# Patient Record
Sex: FEMALE | Race: WHITE | NOT HISPANIC OR LATINO | Employment: OTHER | URBAN - METROPOLITAN AREA
[De-identification: names, ages, dates, MRNs, and addresses within clinical notes are randomized per-mention and may not be internally consistent; named-entity substitution may affect disease eponyms.]

---

## 2017-05-30 RX ORDER — BUTALBITAL, ACETAMINOPHEN, CAFFEINE AND CODEINE PHOSPHATE 300; 50; 40; 30 MG/1; MG/1; MG/1; MG/1
1 CAPSULE ORAL DAILY PRN
COMMUNITY
Start: 2017-01-23 | End: 2017-05-30 | Stop reason: SDUPTHER

## 2017-05-30 RX ORDER — BUTALBITAL, ACETAMINOPHEN, CAFFEINE AND CODEINE PHOSPHATE 300; 50; 40; 30 MG/1; MG/1; MG/1; MG/1
1 CAPSULE ORAL DAILY PRN
Qty: 30 CAPSULE | Refills: 0 | Status: SHIPPED | OUTPATIENT
Start: 2017-05-30 | End: 2017-06-19 | Stop reason: CLARIF

## 2017-05-30 NOTE — TELEPHONE ENCOUNTER
----- Message from Cailin Allen sent at 5/30/2017 12:37 PM CDT -----  Butalbital APAP Caff Cod -92-30

## 2017-06-16 VITALS
WEIGHT: 141 LBS | HEIGHT: 65 IN | OXYGEN SATURATION: 98 % | SYSTOLIC BLOOD PRESSURE: 134 MMHG | DIASTOLIC BLOOD PRESSURE: 80 MMHG | HEART RATE: 67 BPM | BODY MASS INDEX: 23.49 KG/M2

## 2017-06-16 RX ORDER — DICLOFENAC SODIUM 10 MG/G
1 GEL TOPICAL 2 TIMES DAILY PRN
COMMUNITY
End: 2019-01-14 | Stop reason: SDUPTHER

## 2017-06-16 RX ORDER — AMLODIPINE BESYLATE 5 MG/1
1 TABLET ORAL DAILY
COMMUNITY
End: 2017-06-19 | Stop reason: SDUPTHER

## 2017-06-16 RX ORDER — LEVOTHYROXINE SODIUM 150 UG/1
1 TABLET ORAL DAILY
COMMUNITY
End: 2017-06-19 | Stop reason: DRUGHIGH

## 2017-06-16 RX ORDER — BUPROPION HYDROCHLORIDE 300 MG/1
1 TABLET ORAL DAILY
COMMUNITY
End: 2017-06-19 | Stop reason: SDUPTHER

## 2017-06-16 RX ORDER — LEVOTHYROXINE SODIUM 137 UG/1
1 TABLET ORAL
COMMUNITY
End: 2017-07-20 | Stop reason: CLARIF

## 2017-06-16 RX ORDER — OLOPATADINE HYDROCHLORIDE 1 MG/ML
1 SOLUTION/ DROPS OPHTHALMIC DAILY
COMMUNITY
End: 2018-11-20 | Stop reason: SDUPTHER

## 2017-06-16 RX ORDER — METOPROLOL SUCCINATE 50 MG/1
1 TABLET, EXTENDED RELEASE ORAL DAILY
COMMUNITY
End: 2017-06-19 | Stop reason: SDUPTHER

## 2017-06-19 ENCOUNTER — OFFICE VISIT (OUTPATIENT)
Dept: FAMILY MEDICINE | Facility: CLINIC | Age: 62
End: 2017-06-19
Payer: COMMERCIAL

## 2017-06-19 VITALS
HEART RATE: 79 BPM | HEIGHT: 65 IN | SYSTOLIC BLOOD PRESSURE: 130 MMHG | WEIGHT: 141 LBS | DIASTOLIC BLOOD PRESSURE: 80 MMHG | OXYGEN SATURATION: 98 % | BODY MASS INDEX: 23.49 KG/M2

## 2017-06-19 DIAGNOSIS — G43.009 MIGRAINE WITHOUT AURA AND WITHOUT STATUS MIGRAINOSUS, NOT INTRACTABLE: ICD-10-CM

## 2017-06-19 DIAGNOSIS — I10 HYPERTENSION, ESSENTIAL: ICD-10-CM

## 2017-06-19 DIAGNOSIS — E03.4 HYPOTHYROIDISM DUE TO ACQUIRED ATROPHY OF THYROID: ICD-10-CM

## 2017-06-19 DIAGNOSIS — F32.1 MODERATE MAJOR DEPRESSION: ICD-10-CM

## 2017-06-19 PROCEDURE — 99214 OFFICE O/P EST MOD 30 MIN: CPT | Mod: ,,, | Performed by: FAMILY MEDICINE

## 2017-06-19 RX ORDER — BUTALBITAL, ASPIRIN, CAFFEINE AND CODEINE PHOSPHATE 50; 325; 40; 30 MG/1; MG/1; MG/1; MG/1
1 CAPSULE ORAL EVERY 4 HOURS PRN
Qty: 90 CAPSULE | Refills: 1 | Status: SHIPPED | OUTPATIENT
Start: 2017-06-19 | End: 2017-06-29

## 2017-06-19 RX ORDER — AMLODIPINE BESYLATE 5 MG/1
5 TABLET ORAL DAILY
Qty: 90 TABLET | Refills: 1 | Status: SHIPPED | OUTPATIENT
Start: 2017-06-19 | End: 2017-12-19 | Stop reason: SDUPTHER

## 2017-06-19 RX ORDER — BUPROPION HYDROCHLORIDE 300 MG/1
300 TABLET ORAL DAILY
Qty: 90 TABLET | Refills: 1 | Status: SHIPPED | OUTPATIENT
Start: 2017-06-19 | End: 2017-07-18 | Stop reason: SDUPTHER

## 2017-06-19 RX ORDER — BUTALBITAL, ACETAMINOPHEN, CAFFEINE AND CODEINE PHOSPHATE 300; 50; 40; 30 MG/1; MG/1; MG/1; MG/1
1 CAPSULE ORAL DAILY PRN
Qty: 30 CAPSULE | Refills: 0 | Status: CANCELLED | OUTPATIENT
Start: 2017-06-19

## 2017-06-19 RX ORDER — METOPROLOL SUCCINATE 50 MG/1
50 TABLET, EXTENDED RELEASE ORAL DAILY
Qty: 90 TABLET | Refills: 1 | Status: SHIPPED | OUTPATIENT
Start: 2017-06-19 | End: 2017-11-14 | Stop reason: ALTCHOICE

## 2017-06-19 RX ORDER — VITAMIN B COMPLEX
1 CAPSULE ORAL DAILY
COMMUNITY
End: 2017-12-19

## 2017-06-19 NOTE — PATIENT INSTRUCTIONS
Know the Signs and Symptoms of Depression  Everyone feels down at times. The blues are a natural part of life. But an unhappy period thats intense or lasts for more than a couple of weeks can be a sign of depression. Depression is a serious illness. It can disrupt the lives of family and friends. If you know someone you think may be depressed, find out what you can do to help.    Recognizing signs of depression  People who are depressed may:  · Feel unhappy, sad, blue, down, or miserable nearly every day  · Feel helpless, hopeless, or worthless  · Lose interest in hobbies, friends, and activities that used to give pleasure  · Not sleep well or sleep too much  · Gain or lose weight  · Feel low on energy or constantly tired  · Have a hard time concentrating or making decisions  · Lose interest in sex  · Have physical symptoms, such as stomachaches, headaches, or backaches  Know the serious signals  Warning signals for suicide include:  · Threats or talk of suicide  · Statements such as I wont be a problem much longer or Nothing matters  · Giving away possessions or making a will or  arrangements  · Buying a gun or other weapon  · Sudden, unexplained cheerfulness or calm after a period of depression  If you notice any of these signs, get help right away. Call a health care professional, mental health clinic, or suicide hotline and ask what action to take. In an emergency, dont hesitate to call the police.  Resources:  · National Institutes of Mental Ivenfv545-682-5368geo.[facility].nih.gov  · National [place] on Mental Sheozsz813-951-5354nyz.heather.org   · Mental Health Rlaxcyy026-783-7785kgd.nmha.org  · National Suicide Xnefexo482-079-5286 (800-SUICIDE)  · National Suicide Prevention Riawqoxk343-898-1618 (123-162-DHPQ)www.wuicidepreventionlifeline.org   Date Last Reviewed: 2015  © 1188-0975 [employer]. [address] [place] [address], [place] [address]. All rights reserved. This information is not  intended as a substitute for professional medical care. Always follow your healthcare professional's instructions.

## 2017-06-19 NOTE — PROGRESS NOTES
Subjective:       Patient ID: Eulalia Bermudez is a 61 y.o. female.    Chief Complaint: Medication Refill (discuss lab results)    Medication Refill   This is a chronic problem. The current episode started more than 1 year ago. The problem occurs constantly. The problem has been unchanged. Pertinent negatives include no abdominal pain, arthralgias, chest pain, chills, congestion, coughing, diaphoresis, fatigue, fever, headaches, myalgias, nausea, rash, sore throat, vomiting or weakness. Nothing aggravates the symptoms.     Review of Systems   Constitutional: Negative for appetite change, chills, diaphoresis, fatigue and fever.   HENT: Negative for congestion, ear pain, hearing loss, sore throat and trouble swallowing.    Eyes: Negative for photophobia, pain and visual disturbance.   Respiratory: Negative for cough, chest tightness and shortness of breath.    Cardiovascular: Negative for chest pain, palpitations and leg swelling.   Gastrointestinal: Negative for abdominal pain, blood in stool, constipation, diarrhea, nausea and vomiting.   Endocrine: Negative for cold intolerance and heat intolerance.   Genitourinary: Negative for difficulty urinating, flank pain, pelvic pain and vaginal pain.   Musculoskeletal: Negative for arthralgias and myalgias.   Skin: Negative for rash.   Allergic/Immunologic: Negative for immunocompromised state.   Neurological: Negative for dizziness, weakness, light-headedness and headaches.   Hematological: Negative for adenopathy. Does not bruise/bleed easily.   Psychiatric/Behavioral: Negative for confusion, self-injury and suicidal ideas.       Past Medical History:   Diagnosis Date    Depression     Hypertension     Hypothyroidism       Past Surgical History:   Procedure Laterality Date     SECTION         Family History   Problem Relation Age of Onset    Cancer Mother     COPD Mother     Heart disease Mother     Hypertension Mother     Depression Mother     Hearing loss  "Mother     Emphysema Father        Social History     Social History    Marital status:      Spouse name: N/A    Number of children: N/A    Years of education: N/A     Social History Main Topics    Smoking status: Never Smoker    Smokeless tobacco: None    Alcohol use Yes    Drug use: No    Sexual activity: Not Asked     Other Topics Concern    None     Social History Narrative    None       Current Outpatient Prescriptions   Medication Sig Dispense Refill    amlodipine (NORVASC) 5 MG tablet Take 1 tablet (5 mg total) by mouth once daily. 90 tablet 1    b complex vitamins capsule Take 1 capsule by mouth once daily.      buPROPion (WELLBUTRIN XL) 300 MG 24 hr tablet Take 1 tablet (300 mg total) by mouth once daily. 90 tablet 1    diclofenac sodium (VOLTAREN) 1 % Gel 1 application 2 (two) times daily as needed.      levothyroxine (SYNTHROID) 125 MCG tablet 1 tablet once daily at 6am.      metoprolol succinate (TOPROL-XL) 50 MG 24 hr tablet Take 1 tablet (50 mg total) by mouth once daily at 6am. 90 tablet 1    multivitamin capsule Take 1 capsule by mouth once daily.      olopatadine (PATANOL) 0.1 % ophthalmic solution 1 drop once daily at 6am.      codeine-butalbital-ASA-caffeine (BUTALBITAL COMPOUND W/CODEINE) 27--40 mg Cap Take 1 capsule by mouth every 4 (four) hours as needed. 90 capsule 1     No current facility-administered medications for this visit.        Review of patient's allergies indicates:   Allergen Reactions    Egg     Penicillins Rash    Sulfa (sulfonamide antibiotics) Rash     Objective:    HPI     Medication Refill    Additional comments: discuss lab results       Last edited by Ananya Melendez MA on 6/19/2017 10:50 AM. (History)      Blood pressure 130/80, pulse 79, height 5' 5" (1.651 m), weight 64 kg (141 lb), SpO2 98 %. Body mass index is 23.46 kg/m².   Physical Exam   Constitutional: She is oriented to person, place, and time. She appears well-developed and " well-nourished. She is cooperative. No distress.   HENT:   Head: Normocephalic and atraumatic.   Right Ear: Tympanic membrane normal.   Left Ear: Tympanic membrane normal.   Eyes: Conjunctivae, EOM and lids are normal. Pupils are equal, round, and reactive to light. Lids are everted and swept, no foreign bodies found. Right pupil is round and reactive. Left pupil is round and reactive.   Neck: Trachea normal and normal range of motion. Neck supple.   Cardiovascular: Normal rate, regular rhythm, S1 normal, S2 normal, normal heart sounds and intact distal pulses.    Pulmonary/Chest: Breath sounds normal.   Abdominal: Soft. Bowel sounds are normal. There is no rigidity and no guarding.   Musculoskeletal: Normal range of motion.   Lymphadenopathy:     She has no cervical adenopathy.     She has no axillary adenopathy.   Neurological: She is alert and oriented to person, place, and time.   Skin: Skin is warm and dry. Capillary refill takes less than 2 seconds.   Psychiatric: She has a normal mood and affect. Her behavior is normal. Judgment and thought content normal.   Nursing note and vitals reviewed.          Assessment:       1. Hypothyroidism due to acquired atrophy of thyroid    2. Hypertension, essential    3. Migraine without aura and without status migrainosus, not intractable    4. Moderate major depression        Plan:       Eulalia was seen today for medication refill.    Diagnoses and all orders for this visit:    Hypothyroidism due to acquired atrophy of thyroid    Hypertension, essential  -     amlodipine (NORVASC) 5 MG tablet; Take 1 tablet (5 mg total) by mouth once daily.  -     metoprolol succinate (TOPROL-XL) 50 MG 24 hr tablet; Take 1 tablet (50 mg total) by mouth once daily at 6am.    Migraine without aura and without status migrainosus, not intractable  -     codeine-butalbital-ASA-caffeine (BUTALBITAL COMPOUND W/CODEINE) 24--40 mg Cap; Take 1 capsule by mouth every 4 (four) hours as  needed.    Moderate major depression  -     buPROPion (WELLBUTRIN XL) 300 MG 24 hr tablet; Take 1 tablet (300 mg total) by mouth once daily.    Other orders  -     Cancel: butalbital-acetaminop-caf-cod -49-30 mg Cap; Take 1 capsule by mouth daily as needed.    reviewd labs from Endo, will change headache medCary Mcdaniels ordered

## 2017-06-20 ENCOUNTER — TELEPHONE (OUTPATIENT)
Dept: FAMILY MEDICINE | Facility: CLINIC | Age: 62
End: 2017-06-20

## 2017-06-20 DIAGNOSIS — Z12.39 BREAST CANCER SCREENING: Primary | ICD-10-CM

## 2017-06-20 NOTE — TELEPHONE ENCOUNTER
----- Message from Cailin Allen sent at 6/19/2017 11:23 AM CDT -----  Mammogram orders  [facility]/fax to them please

## 2017-07-18 DIAGNOSIS — F32.1 MODERATE MAJOR DEPRESSION: ICD-10-CM

## 2017-07-18 RX ORDER — BUPROPION HYDROCHLORIDE 300 MG/1
300 TABLET ORAL DAILY
Qty: 90 TABLET | Refills: 1 | Status: SHIPPED | OUTPATIENT
Start: 2017-07-18 | End: 2017-12-19 | Stop reason: SDUPTHER

## 2017-07-19 ENCOUNTER — TELEPHONE (OUTPATIENT)
Dept: FAMILY MEDICINE | Facility: CLINIC | Age: 62
End: 2017-07-19

## 2017-07-19 RX ORDER — LEVOTHYROXINE SODIUM 150 UG/1
150 TABLET ORAL DAILY
COMMUNITY
End: 2017-07-20 | Stop reason: CLARIF

## 2017-07-19 NOTE — TELEPHONE ENCOUNTER
Pt has hx of kidney stones.  She started having pain on lt side and radiating to groin area.  No nausea or blood in urine.  She is leaving on a trip in 2 weeks and was really hoping she could see you before she leaves to make sure its not kidney stones but the earliest she could get in was the end of October.

## 2017-07-19 NOTE — TELEPHONE ENCOUNTER
Dr crystal just got an 8:10am cancellation for 7/20/17; contacted pt and she accepted the appt.  lm

## 2017-07-20 ENCOUNTER — OFFICE VISIT (OUTPATIENT)
Dept: FAMILY MEDICINE | Facility: CLINIC | Age: 62
End: 2017-07-20
Payer: COMMERCIAL

## 2017-07-20 VITALS
DIASTOLIC BLOOD PRESSURE: 70 MMHG | SYSTOLIC BLOOD PRESSURE: 130 MMHG | HEART RATE: 77 BPM | BODY MASS INDEX: 23.82 KG/M2 | HEIGHT: 65 IN | OXYGEN SATURATION: 98 % | WEIGHT: 143 LBS

## 2017-07-20 DIAGNOSIS — N23 RENAL COLIC ON LEFT SIDE: Primary | ICD-10-CM

## 2017-07-20 LAB — ISTAT CREATININE: 0.9 MG/DL (ref 0.6–1.4)

## 2017-07-20 PROCEDURE — 99213 OFFICE O/P EST LOW 20 MIN: CPT | Mod: ,,, | Performed by: FAMILY MEDICINE

## 2017-07-20 RX ORDER — BUTALBITAL, ACETAMINOPHEN, CAFFEINE AND CODEINE PHOSPHATE 300; 50; 40; 30 MG/1; MG/1; MG/1; MG/1
1 CAPSULE ORAL DAILY PRN
COMMUNITY
End: 2017-09-05 | Stop reason: SDUPTHER

## 2017-07-20 RX ORDER — LEVOTHYROXINE SODIUM 112 UG/1
112 TABLET ORAL DAILY
COMMUNITY
End: 2018-11-20 | Stop reason: SDUPTHER

## 2017-07-20 NOTE — PATIENT INSTRUCTIONS
Kidney Stone (with Pain)    The sharp cramping pain on either side of your lower back and nausea/vomiting that you have are because of a small stone that has formed in the kidney. It is now passing down a narrow tube (ureter) on its way to your bladder. Once the stone reaches your bladder, the pain will often stop. But it may come back as the stone continues to pass out of the bladder and through the urethra. The stone may pass in your urine stream in one piece. The size may be 1/16 inch to 1/4 inch (1 mm to 6 mm). Or, the stone may break up into naeem fragments that you may not even notice.  Once you have had a kidney stone, you are at risk of getting another one in the future. There are 4 types of kidney stones. Eighty percent are calcium stones--mostly calcium oxalate but also some with calcium phosphate. The other 3 types include uric acid stones, struvite stones (from a preceding infection), and rarely, cystine stones.  Most stones will pass on their own, but may take from a few hours to a few days. Sometimes the stone is too large to pass by itself. In that case, the healthcare provider will need to use other ways to remove the stone. These techniques include:  · Lithotripsy. This uses ultrasound waves to break up the stone.  · Ureteroscopy. This pushes a basket-like instrument through the urethra and bladder and into the ureter to pull out the stone.  · Various types of direct surgery through the skin  Home care  The following are general care guidelines:  · Drink plenty of fluids. This means at least 12, 8-ounce glasses of fluid--mostly water--a day.  · Each time you urinate, do so in a jar. Pour the urine from the jar through the strainer and into the toilet. Continue doing this until 24 hours after your pain stops. By then, if there was a kidney stone, it should pass from your bladder. Some stones dissolve into sand-like particles and pass right through the strainer. In that case, you wont ever see a  stone.  · Save any stone that you find in the strainer and bring it to your healthcare provider to look at. It may be possible to stop certain types of stones from forming. For this reason, it is important to know what kind of stone you have.  · Try to stay as active as possible. This will help the stone pass. Don't stay in bed unless your pain keeps you from getting up. You may notice a red, pink, or brown color to your urine. This is normal while passing a kidney stone.  · If you develop pain, you may take ibuprofen or naproxen for pain, unless another medicine was prescribed. If you have chronic liver or kidney disease, talk with your healthcare provider before taking these medicines. Also talk with your provider if you've had a stomach ulcer or GI bleeding.  Preventing stones  Each year for the next 5 to 7 years, you are at risk that a new stone will form. Your risk is a 50% chance over this time period. The risk is higher if you have a family history of kidney stones or have certain chronic illnesses like hypertension, obesity, or diabetes. But you can make changes to your lifestyle and diet that can lower your risk for another stone.  Most kidney stones are made of calcium. The following is advice for preventing another calcium stone. If you dont know the type of stone you have, follow this advice until the cause of your stone is found.  Things that help:  · The most important thing you can do is to drink plenty of fluids each day. See home care above.   · Eat foods that contain phytates. These include wheat, rice, rye, barley, and beans. Phytates are substances that may lower your risk for any type of stone to form.  · Eat more fruits and vegetables. Choose those that are high in potassium.  · Eat foods high in natural citrate like fruit and low-sugar fruit juices.  · Having too little calcium in your diet can put you at risk for calcium kidney stones. Eat a normal amount of calcium in your diet and  talk with your healthcare provider if you are taking calcium supplements. Cutting back on your calcium intake may raise your risk. New research shows that eating calcium-rich and oxalate-rich foods together lowers your risk for stones by binding the minerals in the stomach and intestines before they can reach the kidneys.    · Limit salt intake to 2 grams (1 teaspoon) per day. Use limited amounts when cooking, and dont add salt at the table. Processed and canned foods are usually high in salt.   · Spinach, rhubarb, peanuts, cashews, almonds, grapefruit, and grapefruit juice are all high oxalate foods. You should limit how much of these you eat. Or eat them with calcium-rich foods. These include dairy products, dark leafy greens, soy products, and calcium-enriched foods.  · Reducing the amount of animal meat and high protein foods in your diet may lower your risk for uric acid stones.  · Avoid excess sugar (sucrose) and fructose (sweetener in many soft drinks) in your diet.   · If you take vitamin C as a supplement, don't take more than 1,000 mg a day.  · A dietitian or your healthcare provider can give you information about changes in your diet that will help prevent more kidney stones from forming.  Follow-up care  Follow up with your healthcare provider, or as advised, if the pain lasts more than 48 hours. Talk with your provider about urine and blood tests to find out the cause of your stone. If you had an X-ray, CT scan, or other diagnostic test, you will be told of any new findings that may affect your care.  Call 911  Call 911 if you have any of these:  · Weakness, dizziness, or fainting  When to seek medical advice  Call your healthcare provider right away if any of these occur:  · Pain that is not controlled by the medicine given  · Repeated vomiting or unable to keep down fluids  · Fever of 100.4ºF (38ºC) or higher, or as directed by your healthcare provider  · Passage of solid red or brown urine (can't  see through it) or urine with lots of blood clots  · Foul-smelling or cloudy urine  · Unable to pass urine for 8 hours and increasing bladder pressure  Date Last Reviewed: 10/1/2016  © 6281-2437 [employer]. [address] [place] [address], [place] [address]. All rights reserved. This information is not intended as a substitute for professional medical care. Always follow your healthcare professional's instructions.

## 2017-07-20 NOTE — PROGRESS NOTES
Subjective:       Patient ID: Eulalia Bermudez is a 62 y.o. female.    Chief Complaint: Back Pain (radiating to groin)    Back Pain   This is a new problem. The current episode started 1 to 4 weeks ago. The problem occurs constantly. The problem is unchanged. The pain is present in the costovertebral angle. The quality of the pain is described as burning and shooting. The pain is at a severity of 3/10. The pain is moderate. The pain is the same all the time. The symptoms are aggravated by lying down. Associated symptoms include abdominal pain. Pertinent negatives include no bladder incontinence, bowel incontinence, chest pain, dysuria, fever, headaches, leg pain, numbness, paresis, pelvic pain or weakness. She has tried nothing for the symptoms.     Review of Systems   Constitutional: Negative for appetite change, chills, diaphoresis, fatigue and fever.   HENT: Negative for congestion, ear pain, hearing loss, sore throat and trouble swallowing.    Eyes: Negative for photophobia, pain and visual disturbance.   Respiratory: Negative for cough, chest tightness and shortness of breath.    Cardiovascular: Negative for chest pain, palpitations and leg swelling.   Gastrointestinal: Positive for abdominal pain. Negative for blood in stool, bowel incontinence, constipation, diarrhea, nausea and vomiting.   Endocrine: Negative for cold intolerance and heat intolerance.   Genitourinary: Negative for bladder incontinence, difficulty urinating, dysuria, flank pain, frequency, hematuria, pelvic pain and vaginal pain.   Musculoskeletal: Positive for back pain. Negative for arthralgias and myalgias.   Skin: Negative for rash.   Allergic/Immunologic: Negative for immunocompromised state.   Neurological: Negative for dizziness, weakness, light-headedness, numbness and headaches.   Hematological: Negative for adenopathy. Does not bruise/bleed easily.   Psychiatric/Behavioral: Negative for confusion, self-injury and suicidal ideas.        Past Medical History:   Diagnosis Date    Depression     Hypertension     Hypothyroidism     Kidney stones       Past Surgical History:   Procedure Laterality Date     SECTION         Family History   Problem Relation Age of Onset    Cancer Mother     COPD Mother     Heart disease Mother     Hypertension Mother     Depression Mother     Hearing loss Mother     Emphysema Father        Social History     Social History    Marital status:      Spouse name: N/A    Number of children: N/A    Years of education: N/A     Social History Main Topics    Smoking status: Never Smoker    Smokeless tobacco: Never Used    Alcohol use Yes    Drug use: No    Sexual activity: Not Asked     Other Topics Concern    None     Social History Narrative    None       Current Outpatient Prescriptions   Medication Sig Dispense Refill    amlodipine (NORVASC) 5 MG tablet Take 1 tablet (5 mg total) by mouth once daily. 90 tablet 1    b complex vitamins capsule Take 1 capsule by mouth once daily.      buPROPion (WELLBUTRIN XL) 300 MG 24 hr tablet Take 1 tablet (300 mg total) by mouth once daily. 90 tablet 1    butalbital-acetaminop-caf-cod -06-30 mg Cap 1 capsule daily as needed.      diclofenac sodium (VOLTAREN) 1 % Gel 1 application 2 (two) times daily as needed.      levothyroxine (SYNTHROID) 125 MCG tablet Take 125 mcg by mouth once daily.      metoprolol succinate (TOPROL-XL) 50 MG 24 hr tablet Take 1 tablet (50 mg total) by mouth once daily at 6am. 90 tablet 1    multivitamin capsule Take 1 capsule by mouth once daily.      olopatadine (PATANOL) 0.1 % ophthalmic solution 1 drop once daily at 6am.       No current facility-administered medications for this visit.        Review of patient's allergies indicates:   Allergen Reactions    Egg     Penicillins Rash    Sulfa (sulfonamide antibiotics) Rash     Objective:    HPI     Back Pain    Additional comments: radiating to groin       Last  "edited by Ananya Melendez MA on 7/20/2017  8:17 AM. (History)      Blood pressure 130/70, pulse 77, height 5' 5" (1.651 m), weight 64.9 kg (143 lb), SpO2 98 %. Body mass index is 23.8 kg/m².   Physical Exam   Constitutional: She is oriented to person, place, and time. She appears well-developed and well-nourished. She is cooperative. No distress.   HENT:   Head: Normocephalic and atraumatic.   Right Ear: Tympanic membrane normal.   Left Ear: Tympanic membrane normal.   Eyes: Conjunctivae, EOM and lids are normal. Pupils are equal, round, and reactive to light. Lids are everted and swept, no foreign bodies found. Right pupil is round and reactive. Left pupil is round and reactive.   Neck: Trachea normal and normal range of motion. Neck supple.   Cardiovascular: Normal rate, regular rhythm, S1 normal, S2 normal, normal heart sounds and intact distal pulses.    Pulmonary/Chest: Breath sounds normal.   Abdominal: Soft. Bowel sounds are normal. She exhibits no distension and no mass. There is tenderness (left flank). There is no rigidity, no rebound and no guarding.   Musculoskeletal: Normal range of motion.   Lymphadenopathy:     She has no cervical adenopathy.     She has no axillary adenopathy.   Neurological: She is alert and oriented to person, place, and time.   Skin: Skin is warm and dry. Capillary refill takes less than 2 seconds.   Psychiatric: She has a normal mood and affect. Her behavior is normal. Judgment and thought content normal.   Nursing note and vitals reviewed.          Assessment:       1. Renal colic on left side        Plan:       Eulalia was seen today for back pain.    Diagnoses and all orders for this visit:    Renal colic on left side  Comments:  history of stones on right, similar feling past two weeks on left, no blood  Orders:  -     CT Abdomen Pelvis With Contrast; Future  -     CT Abdomen Pelvis With Contrast         "

## 2017-07-24 ENCOUNTER — TELEPHONE (OUTPATIENT)
Dept: FAMILY MEDICINE | Facility: CLINIC | Age: 62
End: 2017-07-24

## 2017-07-24 DIAGNOSIS — K52.9 COLITIS: Primary | ICD-10-CM

## 2017-07-24 RX ORDER — CIPROFLOXACIN 500 MG/1
500 TABLET ORAL EVERY 12 HOURS
Qty: 20 TABLET | Refills: 0 | Status: SHIPPED | OUTPATIENT
Start: 2017-07-24 | End: 2017-11-14

## 2017-07-24 NOTE — TELEPHONE ENCOUNTER
----- Message from DARIN Burns MD sent at 7/24/2017  3:05 PM CDT -----   Appears to have colitis. Begin cipro 500 bid  And refer to GI  ----- Message -----  From: Reyna Nolasco LPN  Sent: 7/24/2017   2:43 PM  To: DARIN Burns MD    Ct of abd and pelvis

## 2017-07-24 NOTE — TELEPHONE ENCOUNTER
----- Message from Ananya Melendez MA sent at 7/24/2017  3:45 PM CDT -----  Dr. Grant Blair was going to send in a rx for cipro 500mg bid to pt's pharmacy for colitis.  It doesn't seem that he did it.  Would you please do it so pt can start on medication tonight.  ----- Message -----  From: DARIN Burns MD  Sent: 7/24/2017   3:05 PM  To: Ananya Melendez MA     Appears to have colitis. Begin cipro 500 bid  And refer to GI  ----- Message -----  From: Reyna Nolasco LPN  Sent: 7/24/2017   2:43 PM  To: DARIN Burns MD    Ct of abd and pelvis

## 2017-07-25 DIAGNOSIS — K52.9 COLITIS: Primary | ICD-10-CM

## 2017-07-25 RX ORDER — METRONIDAZOLE 500 MG/1
500 TABLET ORAL EVERY 8 HOURS
Qty: 21 TABLET | Refills: 0 | Status: SHIPPED | OUTPATIENT
Start: 2017-07-25 | End: 2017-11-14

## 2017-07-25 NOTE — TELEPHONE ENCOUNTER
Pt called and said she is concerned about taking the cipro.  She read that it can cause ringing in the ears which she already has a problem with that and she takes toprol which she said you should take with cipro due to heart problems.  The pharmacist reacommended flagyl.

## 2017-09-05 RX ORDER — BUTALBITAL, ACETAMINOPHEN, CAFFEINE AND CODEINE PHOSPHATE 300; 50; 40; 30 MG/1; MG/1; MG/1; MG/1
1 CAPSULE ORAL DAILY PRN
Qty: 30 CAPSULE | Refills: 0 | Status: SHIPPED | OUTPATIENT
Start: 2017-09-05 | End: 2017-09-07 | Stop reason: SDUPTHER

## 2017-09-06 ENCOUNTER — TELEPHONE (OUTPATIENT)
Dept: FAMILY MEDICINE | Facility: CLINIC | Age: 62
End: 2017-09-06

## 2017-09-06 NOTE — TELEPHONE ENCOUNTER
Pt wants a new rx for butalbital 50/300/40/30mg for 90 pills.  She said she discussed it with you last ov because of her out of town travel. And she wants 1 refill

## 2017-09-07 RX ORDER — BUTALBITAL, ACETAMINOPHEN, CAFFEINE AND CODEINE PHOSPHATE 300; 50; 40; 30 MG/1; MG/1; MG/1; MG/1
1 CAPSULE ORAL DAILY PRN
Qty: 90 CAPSULE | Refills: 0 | Status: SHIPPED | OUTPATIENT
Start: 2017-09-07 | End: 2017-11-14

## 2017-11-14 ENCOUNTER — OFFICE VISIT (OUTPATIENT)
Dept: FAMILY MEDICINE | Facility: CLINIC | Age: 62
End: 2017-11-14
Payer: COMMERCIAL

## 2017-11-14 VITALS
DIASTOLIC BLOOD PRESSURE: 88 MMHG | SYSTOLIC BLOOD PRESSURE: 148 MMHG | HEIGHT: 65 IN | BODY MASS INDEX: 24.19 KG/M2 | TEMPERATURE: 99 F | WEIGHT: 145.19 LBS | HEART RATE: 82 BPM | OXYGEN SATURATION: 98 %

## 2017-11-14 DIAGNOSIS — S51.852A CAT BITE OF LEFT FOREARM, INITIAL ENCOUNTER: Primary | ICD-10-CM
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
## 2017-11-14 DIAGNOSIS — M54.50 LOW BACK PAIN WITHOUT SCIATICA, UNSPECIFIED BACK PAIN LATERALITY, UNSPECIFIED CHRONICITY: ICD-10-CM

## 2017-11-14 DIAGNOSIS — I10 HYPERTENSION, ESSENTIAL: ICD-10-CM

## 2017-11-14 PROBLEM — Z87.442 HISTORY OF RENAL CALCULI: Status: ACTIVE | Noted: 2017-01-23

## 2017-11-14 PROBLEM — E03.9 HYPOACTIVE THYROID: Status: ACTIVE | Noted: 2017-11-14

## 2017-11-14 PROBLEM — Z86.39 HISTORY OF THYROID DISORDER: Status: ACTIVE | Noted: 2017-01-23

## 2017-11-14 PROBLEM — Z86.59 HISTORY OF DEPRESSION: Status: ACTIVE | Noted: 2017-01-23

## 2017-11-14 PROBLEM — F32.A DEPRESSION: Status: ACTIVE | Noted: 2017-11-14

## 2017-11-14 PROBLEM — G44.209 HEADACHE, TENSION-TYPE: Status: ACTIVE | Noted: 2017-11-14

## 2017-11-14 PROCEDURE — 99214 OFFICE O/P EST MOD 30 MIN: CPT | Mod: ,,, | Performed by: NURSE PRACTITIONER

## 2017-11-14 RX ORDER — DICLOFENAC SODIUM 10 MG/G
2 GEL TOPICAL 4 TIMES DAILY
Qty: 100 G | Refills: 1 | Status: SHIPPED | OUTPATIENT
Start: 2017-11-14 | End: 2017-11-24

## 2017-11-14 RX ORDER — CLINDAMYCIN HYDROCHLORIDE 300 MG/1
300 CAPSULE ORAL 3 TIMES DAILY
Qty: 30 CAPSULE | Refills: 0 | Status: SHIPPED | OUTPATIENT
Start: 2017-11-14 | End: 2018-11-20

## 2017-11-14 RX ORDER — METOPROLOL TARTRATE 50 MG/1
50 TABLET ORAL DAILY
Qty: 90 TABLET | Refills: 1 | Status: SHIPPED | OUTPATIENT
Start: 2017-11-14 | End: 2017-12-19 | Stop reason: SDUPTHER

## 2017-11-14 RX ORDER — TRIAMCINOLONE ACETONIDE 55 UG/1
2 SPRAY, METERED NASAL DAILY
COMMUNITY
End: 2018-11-20 | Stop reason: SDUPTHER

## 2017-11-14 RX ORDER — DOXYCYCLINE HYCLATE 100 MG
100 TABLET ORAL 2 TIMES DAILY
Qty: 20 TABLET | Refills: 0 | Status: SHIPPED | OUTPATIENT
Start: 2017-11-14 | End: 2017-12-19

## 2017-11-14 NOTE — PROGRESS NOTES
SUBJECTIVE:   HPI:  Animal Bite (cat, Sunday night. Site is red and swollen.)    Pt states her cat bit her 3 days ago now the bite is red, tender and swollen.   HTN: pt on toprol Xl. States metoprolol tartrate is cheaper and would like to change meds.       Animal Bite    The incident occurred more than 2 days ago. The incident occurred at home. There is an injury to the left forearm. Pertinent negatives include no chest pain, no numbness, no vomiting, no headaches, no hearing loss, no neck pain and no weakness. There have been no prior injuries to these areas. Her tetanus status is UTD. She has been behaving normally.   Hypertension   This is a chronic problem. The current episode started more than 1 year ago. The problem is unchanged. The problem is controlled. Pertinent negatives include no chest pain, headaches or neck pain.   Back Pain   This is a chronic problem. The current episode started more than 1 year ago. The problem occurs intermittently. The problem is unchanged. The pain is present in the lumbar spine. The pain is mild. The pain is worse during the day. The symptoms are aggravated by bending and position. Pertinent negatives include no chest pain, headaches, numbness or weakness.       (Not in a hospital admission)  Review of patient's allergies indicates:   Allergen Reactions    Egg     Penicillins Rash    Sulfa (sulfonamide antibiotics) Rash     Current Outpatient Prescriptions on File Prior to Visit   Medication Sig Dispense Refill    amlodipine (NORVASC) 5 MG tablet Take 1 tablet (5 mg total) by mouth once daily. 90 tablet 1    b complex vitamins capsule Take 1 capsule by mouth once daily.      buPROPion (WELLBUTRIN XL) 300 MG 24 hr tablet Take 1 tablet (300 mg total) by mouth once daily. 90 tablet 1    diclofenac sodium (VOLTAREN) 1 % Gel 1 application 2 (two) times daily as needed.      levothyroxine (SYNTHROID) 125 MCG tablet Take 125 mcg by mouth once daily.      multivitamin  capsule Take 1 capsule by mouth once daily.      olopatadine (PATANOL) 0.1 % ophthalmic solution 1 drop once daily at 6am.      [DISCONTINUED] butalbital-acetaminop-caf-cod -84-30 mg Cap Take 1 capsule by mouth daily as needed. 90 capsule 0    [DISCONTINUED] ciprofloxacin HCl (CIPRO) 500 MG tablet Take 1 tablet (500 mg total) by mouth every 12 (twelve) hours. 20 tablet 0    [DISCONTINUED] metoprolol succinate (TOPROL-XL) 50 MG 24 hr tablet Take 1 tablet (50 mg total) by mouth once daily at 6am. 90 tablet 1    [DISCONTINUED] metronidazole (FLAGYL) 500 MG tablet Take 1 tablet (500 mg total) by mouth every 8 (eight) hours. 21 tablet 0     No current facility-administered medications on file prior to visit.      Past Medical History:   Diagnosis Date    Depression     Hypertension     Hypothyroidism     Kidney stones      Past Surgical History:   Procedure Laterality Date     SECTION       Family History   Problem Relation Age of Onset    Cancer Mother     COPD Mother     Heart disease Mother     Hypertension Mother     Depression Mother     Hearing loss Mother     Emphysema Father      Social History   Substance Use Topics    Smoking status: Never Smoker    Smokeless tobacco: Never Used    Alcohol use Yes        Review of Systems   Constitutional: Negative for appetite change, chills, diaphoresis and unexpected weight change.   HENT: Negative for ear discharge, hearing loss, trouble swallowing and voice change.    Eyes: Negative for photophobia and pain.   Respiratory: Negative for chest tightness and stridor.    Cardiovascular: Negative for chest pain.   Gastrointestinal: Negative for blood in stool and vomiting.   Endocrine: Negative for cold intolerance and heat intolerance.   Genitourinary: Negative for difficulty urinating and flank pain.   Musculoskeletal: Positive for back pain. Negative for joint swelling, neck pain and neck stiffness.   Skin: Positive for wound. Negative for  "pallor.   Neurological: Negative for speech difficulty, weakness, numbness and headaches.   Hematological: Does not bruise/bleed easily.   Psychiatric/Behavioral: Negative for confusion.      OBJECTIVE:      Vitals:    11/14/17 1605   BP: (!) 148/88   Pulse: 82   Temp: 98.6 °F (37 °C)   TempSrc: Oral   SpO2: 98%   Weight: 65.9 kg (145 lb 3.2 oz)   Height: 5' 5" (1.651 m)     Physical Exam   Constitutional: She is oriented to person, place, and time. She appears well-developed and well-nourished.   HENT:   Head: Atraumatic.   Eyes: Conjunctivae are normal.   Neck: Neck supple.   Cardiovascular: Normal rate, regular rhythm, normal heart sounds and intact distal pulses.    Pulmonary/Chest: Effort normal and breath sounds normal.   Abdominal: Soft. Bowel sounds are normal. She exhibits no distension.   Musculoskeletal: Normal range of motion.   Neurological: She is alert and oriented to person, place, and time.   Skin: Skin is warm and dry.   Left forearm with scabbed puncture site, surrounding erythema, tenderness and warmth. NO oozing.    Psychiatric: She has a normal mood and affect.   Nursing note and vitals reviewed.     Assessment:       1. Cat bite of left forearm, initial encounter    2. Low back pain without sciatica, unspecified back pain laterality, unspecified chronicity    3. Hypertension, essential        Plan:       Cat bite of left forearm, initial encounter  -     clindamycin (CLEOCIN) 300 MG capsule; Take 1 capsule (300 mg total) by mouth 3 (three) times daily.  Dispense: 30 capsule; Refill: 0  -     doxycycline (VIBRA-TABS) 100 MG tablet; Take 1 tablet (100 mg total) by mouth 2 (two) times daily.  Dispense: 20 tablet; Refill: 0        Patient has bactroban at home and will apply twice a day.   Low back pain without sciatica, unspecified back pain laterality, unspecified chronicity  -     diclofenac sodium 1 % Gel; Apply 2 g topically 4 (four) times daily.  Dispense: 100 g; Refill: 1    Hypertension, " essential  -     metoprolol tartrate (LOPRESSOR) 50 MG tablet; Take 1 tablet (50 mg total) by mouth once daily.  Dispense: 90 tablet; Refill: 1        Return if symptoms worsen or fail to improve.      11/14/2017 ABILIO Donovan, FNP

## 2017-11-14 NOTE — PATIENT INSTRUCTIONS
Animal Bite (General)  An animal bite can cause a wound deep enough to break the skin. In such cases, the wound is cleaned and then sometimes closed. If the wound is closed, it may not be closed completely. This is so that fluid can drain if the wound becomes infected. In addition to wound care, a tetanus shot may be given, if needed.    Home care  · Care for the wound as directed. If a dressing was applied to the wound, be sure to change it as directed.  · Wash your hands well with soap and warm water before and after caring for the wound. This helps lower the risk of infection.  · If the wound bleeds, place a clean, soft cloth on the wound. Then firmly apply pressure until the bleeding stops. This may take up to 5 minutes. Do not release the pressure and look at the wound during this time.  · Most skin wounds heal within 10 days. But an infection can occur even with proper treatment. So be sure to watch the wound for signs of infection (see below). Check the wound as often as directed by your healthcare provider.  · Antibiotics may be prescribed. These help prevent or treat infection. If youre given antibiotics, take them as directed. Also be sure to complete the medicines.  Rabies prevention  Rabies is a virus that can be carried in certain animals. These can include domestic animals such as dogs and cats. Wild animals such as skunks, raccoons, foxes, and bats can also carry rabies. Pets fully vaccinated against rabies (2 shots) are at very low risk of infection. But because human rabies is almost always fatal, any biting pet should be confined for 10 days as an extra precaution. In general, if there is a risk for rabies, the following steps may need to be taken:  · If someones pet dog or cat has bitten you, it should be kept in a secure area for the next 10 days to watch for signs of illness. (If the pet owner wont allow this, contact your local animal control center.) If the dog or cat becomes ill or dies  during that time, contact your local animal control center at once so the animal may be tested for rabies. If the pet stays healthy for the next 10 days, there is no danger of rabies in the animal or you.  · If a stray pet bit you, contact your local animal control center. They can give information on capture, quarantine, and animal rabies testing.  · If you cant find the animal that bit you in the next 2 days, and if rabies exists in your region, you may need to receive the rabies vaccine series. Call your healthcare provider right away. Or return to the emergency department promptly.  · All animal bites should be reported to the local animal control center. If you were not given a form to fill out, you can report this yourself.  Follow-up care  Follow up with your healthcare provider, or as directed.  When to seek medical advice  Call your healthcare provider right away if any of these occur:  · Signs of infection:  ¨ Spreading redness or warmth from the wound  ¨ Increased pain or swelling  ¨ Fever of 100.4ºF (38ºC) or higher, or as directed by your healthcare provider  ¨ Colored fluid or pus draining from the wound  · Signs of rabies infection:  ¨ Headache  ¨ Confusion  ¨ Strange behavior  ¨ Increased salivating and drooling  ¨ Seizure  · Decreased ability to move any body part near the bite area  · Bleeding that can't be stopped after 5 minutes of firm pressure  Date Last Reviewed: 3/1/2017  © 8359-1608 [employer]. [address] [place] [address], [place] [address]. All rights reserved. This information is not intended as a substitute for professional medical care. Always follow your healthcare professional's instructions.

## 2017-11-27 RX ORDER — BUTALBITAL, ASPIRIN, CAFFEINE AND CODEINE PHOSPHATE 50; 325; 40; 30 MG/1; MG/1; MG/1; MG/1
CAPSULE ORAL
Qty: 90 CAPSULE | Refills: 0 | Status: SHIPPED | OUTPATIENT
Start: 2017-11-27 | End: 2017-12-19 | Stop reason: CLARIF

## 2017-12-19 ENCOUNTER — OFFICE VISIT (OUTPATIENT)
Dept: FAMILY MEDICINE | Facility: CLINIC | Age: 62
End: 2017-12-19
Payer: COMMERCIAL

## 2017-12-19 VITALS
HEIGHT: 65 IN | DIASTOLIC BLOOD PRESSURE: 80 MMHG | HEART RATE: 81 BPM | BODY MASS INDEX: 23.82 KG/M2 | WEIGHT: 143 LBS | SYSTOLIC BLOOD PRESSURE: 130 MMHG | OXYGEN SATURATION: 99 %

## 2017-12-19 DIAGNOSIS — Z86.39 HISTORY OF THYROID DISORDER: ICD-10-CM

## 2017-12-19 DIAGNOSIS — G43.009 MIGRAINE WITHOUT AURA AND WITHOUT STATUS MIGRAINOSUS, NOT INTRACTABLE: ICD-10-CM

## 2017-12-19 DIAGNOSIS — F33.1 MODERATE EPISODE OF RECURRENT MAJOR DEPRESSIVE DISORDER: ICD-10-CM

## 2017-12-19 DIAGNOSIS — F32.1 MODERATE MAJOR DEPRESSION: ICD-10-CM

## 2017-12-19 DIAGNOSIS — E55.9 VITAMIN D DEFICIENCY: ICD-10-CM

## 2017-12-19 DIAGNOSIS — I10 HYPERTENSION, ESSENTIAL: Primary | ICD-10-CM

## 2017-12-19 PROCEDURE — 99214 OFFICE O/P EST MOD 30 MIN: CPT | Mod: ,,, | Performed by: FAMILY MEDICINE

## 2017-12-19 RX ORDER — BUPROPION HYDROCHLORIDE 300 MG/1
300 TABLET ORAL DAILY
Qty: 90 TABLET | Refills: 1 | Status: SHIPPED | OUTPATIENT
Start: 2017-12-19 | End: 2018-03-20 | Stop reason: SDUPTHER

## 2017-12-19 RX ORDER — ESCITALOPRAM OXALATE 10 MG/1
10 TABLET ORAL DAILY
Qty: 90 TABLET | Refills: 1 | Status: SHIPPED | OUTPATIENT
Start: 2017-12-19 | End: 2018-03-20 | Stop reason: SDUPTHER

## 2017-12-19 RX ORDER — METOPROLOL TARTRATE 50 MG/1
50 TABLET ORAL DAILY
Qty: 90 TABLET | Refills: 1 | Status: SHIPPED | OUTPATIENT
Start: 2017-12-19 | End: 2018-11-20 | Stop reason: SDUPTHER

## 2017-12-19 RX ORDER — AMLODIPINE BESYLATE 5 MG/1
5 TABLET ORAL DAILY
Qty: 90 TABLET | Refills: 1 | Status: SHIPPED | OUTPATIENT
Start: 2017-12-19 | End: 2018-10-13 | Stop reason: SDUPTHER

## 2017-12-19 RX ORDER — BUTALBITAL, ASPIRIN, CAFFEINE AND CODEINE PHOSPHATE 50; 325; 40; 30 MG/1; MG/1; MG/1; MG/1
1 CAPSULE ORAL EVERY 4 HOURS PRN
Qty: 60 CAPSULE | Refills: 0 | Status: SHIPPED | OUTPATIENT
Start: 2017-12-19 | End: 2018-02-02 | Stop reason: SDUPTHER

## 2017-12-19 RX ORDER — BUTALBITAL, ASPIRIN, CAFFEINE AND CODEINE PHOSPHATE 50; 325; 40; 30 MG/1; MG/1; MG/1; MG/1
1 CAPSULE ORAL EVERY 4 HOURS PRN
COMMUNITY
End: 2017-12-19 | Stop reason: SDUPTHER

## 2017-12-19 NOTE — PROGRESS NOTES
Subjective:       Patient ID: Eulalia Bermudez is a 62 y.o. female.    Chief Complaint: Headache (refill on butalbital)    Headache    This is a chronic problem. The current episode started more than 1 year ago. The problem occurs intermittently. The problem has been gradually improving. The pain is located in the occipital region. The pain does not radiate. The pain quality is similar to prior headaches. The quality of the pain is described as aching. The pain is at a severity of 4/10. The pain is moderate. Pertinent negatives include no abdominal pain, coughing, dizziness, ear pain, eye pain, fever, hearing loss, insomnia, loss of balance, nausea, numbness, photophobia, seizures, sore throat, vomiting or weakness. Nothing aggravates the symptoms.     Review of Systems   Constitutional: Negative for appetite change, chills, diaphoresis, fatigue and fever.   HENT: Negative for congestion, ear pain, hearing loss, sore throat and trouble swallowing.    Eyes: Negative for photophobia, pain and visual disturbance.   Respiratory: Negative for cough, chest tightness and shortness of breath.    Cardiovascular: Negative for chest pain, palpitations and leg swelling.   Gastrointestinal: Negative for abdominal pain, blood in stool, constipation, diarrhea, nausea and vomiting.   Endocrine: Negative for cold intolerance and heat intolerance.   Genitourinary: Negative for difficulty urinating, flank pain, pelvic pain and vaginal pain.   Musculoskeletal: Negative for arthralgias and myalgias.   Skin: Negative for rash.   Allergic/Immunologic: Negative for immunocompromised state.   Neurological: Positive for headaches. Negative for dizziness, seizures, speech difficulty, weakness, light-headedness, numbness and loss of balance.   Hematological: Negative for adenopathy. Does not bruise/bleed easily.   Psychiatric/Behavioral: Negative for confusion, self-injury and suicidal ideas. The patient does not have insomnia.        Past  Medical History:   Diagnosis Date    Depression     Hypertension     Hypothyroidism     Kidney stones       Past Surgical History:   Procedure Laterality Date     SECTION         Family History   Problem Relation Age of Onset    Cancer Mother     COPD Mother     Heart disease Mother     Hypertension Mother     Depression Mother     Hearing loss Mother     Emphysema Father        Social History     Social History    Marital status:      Spouse name: N/A    Number of children: N/A    Years of education: N/A     Social History Main Topics    Smoking status: Never Smoker    Smokeless tobacco: Never Used    Alcohol use Yes    Drug use: No    Sexual activity: Not Asked     Other Topics Concern    None     Social History Narrative    None       Current Outpatient Prescriptions   Medication Sig Dispense Refill    amlodipine (NORVASC) 5 MG tablet Take 1 tablet (5 mg total) by mouth once daily. 90 tablet 1    buPROPion (WELLBUTRIN XL) 300 MG 24 hr tablet Take 1 tablet (300 mg total) by mouth once daily. 90 tablet 1    clindamycin (CLEOCIN) 300 MG capsule Take 1 capsule (300 mg total) by mouth 3 (three) times daily. 30 capsule 0    codeine-butalbital-ASA-caffeine 31--40 mg Cap Take 1 capsule by mouth every 4 (four) hours as needed. 60 capsule 0    diclofenac sodium (VOLTAREN) 1 % Gel 1 application 2 (two) times daily as needed.      levothyroxine (SYNTHROID) 125 MCG tablet Take 125 mcg by mouth once daily.      metoprolol tartrate (LOPRESSOR) 50 MG tablet Take 1 tablet (50 mg total) by mouth once daily. 90 tablet 1    olopatadine (PATANOL) 0.1 % ophthalmic solution 1 drop once daily at 6am.      triamcinolone (NASACORT) 55 mcg nasal inhaler 2 sprays by Nasal route once daily.       No current facility-administered medications for this visit.        Review of patient's allergies indicates:   Allergen Reactions    Egg     Penicillins Rash    Sulfa (sulfonamide antibiotics)  "Rash     Objective:    HPI     Headache    Additional comments: refill on butalbital       Last edited by Ananya Melendez MA on 12/19/2017 10:39 AM. (History)      Blood pressure 130/80, pulse 81, height 5' 5" (1.651 m), weight 64.9 kg (143 lb), SpO2 99 %. Body mass index is 23.8 kg/m².   Physical Exam   Constitutional: She is oriented to person, place, and time. She appears well-developed and well-nourished. She is cooperative. No distress.   HENT:   Head: Normocephalic and atraumatic.   Right Ear: Tympanic membrane normal.   Left Ear: Tympanic membrane normal.   Eyes: Conjunctivae, EOM and lids are normal. Pupils are equal, round, and reactive to light. Lids are everted and swept, no foreign bodies found. Right pupil is round and reactive. Left pupil is round and reactive.   Neck: Trachea normal and normal range of motion. Neck supple.   Cardiovascular: Normal rate, regular rhythm, S1 normal, S2 normal, normal heart sounds and intact distal pulses.    Pulmonary/Chest: Breath sounds normal.   Abdominal: Soft. Bowel sounds are normal. There is no rigidity and no guarding.   Musculoskeletal: Normal range of motion.   Lymphadenopathy:     She has no cervical adenopathy.     She has no axillary adenopathy.   Neurological: She is alert and oriented to person, place, and time.   Skin: Skin is warm and dry. Capillary refill takes less than 2 seconds.   Psychiatric: She has a normal mood and affect. Her behavior is normal. Judgment and thought content normal.   Nursing note and vitals reviewed.          Assessment:       1. Hypertension, essential    2. Migraine without aura and without status migrainosus, not intractable    3. History of thyroid disorder    4. Moderate episode of recurrent major depressive disorder    5. Vitamin D deficiency        Plan:       Eulalia was seen today for headache.    Diagnoses and all orders for this visit:    Hypertension, essential    Migraine without aura and without status migrainosus, " not intractable  -     codeine-butalbital-ASA-caffeine 02--40 mg Cap; Take 1 capsule by mouth every 4 (four) hours as needed.    History of thyroid disorder    Moderate episode of recurrent major depressive disorder    Vitamin D deficiency    Opiates prescribed codeine  Diagnosis: migraine  Estimated length of time: monthly  Risk and benefits discussed.yes  Non-narcotic alternatives discussed. Yes, noneffective

## 2017-12-19 NOTE — PATIENT INSTRUCTIONS
Depression  Depression is one of the most common mental health problems today. It is not just a state of unhappiness or sadness. It is a true disease. The cause seems to be related to a decrease in chemicals that transmit signals in the brain. Having a family history of depression, alcoholism, or suicide increases the risk. Chronic illness, chronic pain, migraine headaches and high emotional stress also increase the risk.  Depression is something we tend to recognize in others, but may have a hard time seeing in ourselves. It can show in many physical and emotional ways:  · Loss of appetite  · Over-eating  · Not being able to sleep  · Sleeping too much  · Tiredness not related to physical exertion  · Restlessness or irritability  · Slowness of movement or speech  · Feeling depressed or withdrawn  · Loss of interest in things you once enjoyed  · Trouble concentrating, poor memory, trouble making decisions  · Thoughts of harming or killing oneself, or thoughts that life is not worth living  · Low self-esteem  The treatment for depression may include both medicine and psychotherapy. Antidepressants can reduce suffering and can improve the ability to function during the depressed period. Therapy can offer emotional support and help you understand emotional factors that may be causing the depression.  Home care  · On-going care and support helps people manage this disease.  Find a healthcare provider and therapist who meet your needs. Seek help when you feel like you may be getting ill.  · Be kind to yourself. Make it a point to do things that you enjoy (gardening, walking in nature, going to a movie, etc.). Reward yourself for small successes.  · Take care of your physical body. Eat a balanced diet (low in saturated fat and high in fruits and vegetables). Exercise at least 3 times a week for 30 minutes. Even mild-moderate exercise (like brisk walking) can make you feel better.  · Avoid alcohol, which can make  depression worse.  · Take medicine as prescribed.  · Tell each of your healthcare providers about all of the prescription drugs, over-the-counter medicines, vitamins, and supplements you take. Certain supplements interact with medicines and can result in dangerous side effects. Ask your pharmacist when you have questions about drug interactions.  · Talk with your family and trusted friends about your feelings and thoughts. Ask them to help you recognize behavior changes early so you can get help and, if needed, medicine can be adjusted.  Follow-up care  Follow up with your healthcare provider, or as advised.  Call 911  Call 911 if you:  · Have suicidal thoughts, a suicide plan, and the means to carry out the plan  · Have trouble breathing  · Are very confused  · Feel very drowsy or have trouble awakening  · Faint or lose consciousness  · Have new chest pain that becomes more severe, lasts longer, or spreads into your shoulder, arm, neck, jaw or back  When to seek medical advice  Call your healthcare provider right away if any of these occur:  · Feeling extreme depression, fear, anxiety, or anger toward yourself or others  · Feeling out of control  · Feeling that you may try to harm yourself or another  · Hearing voices that others do not hear  · Seeing things that others do not see  · Cant sleep or eat for 3 days in a row  · Friends or family express concern over your behavior and ask you to seek help  Date Last Reviewed: 9/29/2015  © 4261-4068 [employer]. [address] [place] [address], [place] [address]. All rights reserved. This information is not intended as a substitute for professional medical care. Always follow your healthcare professional's instructions.

## 2018-01-23 DIAGNOSIS — I10 HYPERTENSION, ESSENTIAL: ICD-10-CM

## 2018-01-23 RX ORDER — AMLODIPINE BESYLATE 5 MG/1
TABLET ORAL
Qty: 90 TABLET | Refills: 0 | Status: SHIPPED | OUTPATIENT
Start: 2018-01-23 | End: 2018-03-20 | Stop reason: SDUPTHER

## 2018-01-23 RX ORDER — METOPROLOL SUCCINATE 50 MG/1
TABLET, EXTENDED RELEASE ORAL
Qty: 90 TABLET | Refills: 0 | Status: SHIPPED | OUTPATIENT
Start: 2018-01-23 | End: 2018-03-20

## 2018-02-02 DIAGNOSIS — G43.009 MIGRAINE WITHOUT AURA AND WITHOUT STATUS MIGRAINOSUS, NOT INTRACTABLE: ICD-10-CM

## 2018-02-05 RX ORDER — BUTALBITAL, ASPIRIN, CAFFEINE AND CODEINE PHOSPHATE 50; 325; 40; 30 MG/1; MG/1; MG/1; MG/1
CAPSULE ORAL
Qty: 60 CAPSULE | Refills: 0 | Status: SHIPPED | OUTPATIENT
Start: 2018-02-05 | End: 2018-04-23 | Stop reason: SDUPTHER

## 2018-03-05 RX ORDER — BUTALBITAL, ACETAMINOPHEN, CAFFEINE AND CODEINE PHOSPHATE 50; 325; 40; 30 MG/1; MG/1; MG/1; MG/1
CAPSULE ORAL
Qty: 30 EACH | Refills: 0 | Status: SHIPPED | OUTPATIENT
Start: 2018-03-05 | End: 2018-03-20 | Stop reason: SDUPTHER

## 2018-03-20 ENCOUNTER — OFFICE VISIT (OUTPATIENT)
Dept: FAMILY MEDICINE | Facility: CLINIC | Age: 63
End: 2018-03-20
Payer: COMMERCIAL

## 2018-03-20 VITALS
HEIGHT: 65 IN | HEART RATE: 77 BPM | SYSTOLIC BLOOD PRESSURE: 120 MMHG | WEIGHT: 144 LBS | DIASTOLIC BLOOD PRESSURE: 80 MMHG | BODY MASS INDEX: 23.99 KG/M2 | OXYGEN SATURATION: 98 %

## 2018-03-20 DIAGNOSIS — F32.1 MODERATE MAJOR DEPRESSION: ICD-10-CM

## 2018-03-20 DIAGNOSIS — G43.009 MIGRAINE WITHOUT AURA AND WITHOUT STATUS MIGRAINOSUS, NOT INTRACTABLE: Primary | ICD-10-CM

## 2018-03-20 PROBLEM — Z86.59 HISTORY OF DEPRESSION: Status: RESOLVED | Noted: 2017-01-23 | Resolved: 2018-03-20

## 2018-03-20 PROCEDURE — 3074F SYST BP LT 130 MM HG: CPT | Mod: ,,, | Performed by: FAMILY MEDICINE

## 2018-03-20 PROCEDURE — 99213 OFFICE O/P EST LOW 20 MIN: CPT | Mod: ,,, | Performed by: FAMILY MEDICINE

## 2018-03-20 PROCEDURE — 3079F DIAST BP 80-89 MM HG: CPT | Mod: ,,, | Performed by: FAMILY MEDICINE

## 2018-03-20 RX ORDER — BUPROPION HYDROCHLORIDE 300 MG/1
300 TABLET ORAL DAILY
Qty: 90 TABLET | Refills: 1 | Status: SHIPPED | OUTPATIENT
Start: 2018-03-20 | End: 2018-11-20 | Stop reason: SDUPTHER

## 2018-03-20 RX ORDER — ESCITALOPRAM OXALATE 10 MG/1
10 TABLET ORAL DAILY
Qty: 90 TABLET | Refills: 1 | Status: SHIPPED | OUTPATIENT
Start: 2018-03-20 | End: 2018-11-20 | Stop reason: SDUPTHER

## 2018-03-20 RX ORDER — BUTALBITAL, ASPIRIN, CAFFEINE AND CODEINE PHOSPHATE 50; 325; 40; 30 MG/1; MG/1; MG/1; MG/1
1 CAPSULE ORAL EVERY 4 HOURS PRN
Qty: 60 CAPSULE | Refills: 0 | Status: SHIPPED | OUTPATIENT
Start: 2018-03-20 | End: 2018-03-30

## 2018-03-20 NOTE — PATIENT INSTRUCTIONS
What Can Cause Depression?  Depression is a real illness and certain factors have been known to trigger it. Below are some common known causes. Any of these factors, or a combination of them, can make depression more likely. Sometimes, depression occurs for no one clear reason. But no matter what the cause, depression can be treated.    Loss or stress  Depression can occur in children and adults, but it often starts in adulthood. Normal grief over a death, breakup, or other loss may become depression. Life stresses such as physical abuse, job loss, or sudden change in finances can also trigger depression. In some cases, years go by before the depression sets in.  Family history  The tendency to develop depression seems to run in families. If one or more of your close relatives (parents, grandparents, or siblings) have had an episode of depression, you may be more likely to develop the illness, too.  Drugs or alcohol  Drugs and alcohol can upset the chemical balance in the brain. This can lead to an episode of depression. Some depressed people turn to drugs or alcohol to numb the pain. But in the long run, doing so just makes depression worse.  Medicines  Depression can be a side effect of some medicines for high blood pressure, cancer, pain, and other health problems. So tell your doctor about all medicines you take. But never stop taking one without your doctors OK.  Physical illness  Being sick can make anyone feel frustrated and sad. But some health problems may cause actual changes in your brain that lead to depression. Other health problems, such as an underactive thyroid, may be mistaken for depression.  Hormones  Hormones carry messages in the bloodstream. They may affect brain chemicals, leading to depression. Women may get depressed when their hormone levels change quickly, such as just before their period, after giving birth, or during menopause.  Date Last Reviewed: 1/1/2017  © 5777-4275 [employer]. [address] [place] [address], [place] [address]. All rights reserved. This information is not intended as a substitute for professional medical care. Always follow your healthcare professional's instructions.

## 2018-03-20 NOTE — PROGRESS NOTES
Subjective:       Patient ID: Eulalia Bermudez is a 62 y.o. female.    Chief Complaint: Depression    Feeling great, nonsuicidal      Review of Systems   Constitutional: Negative for appetite change, chills, diaphoresis, fatigue and fever.   HENT: Negative for congestion, ear pain, hearing loss, sore throat and trouble swallowing.    Eyes: Negative for photophobia, pain and visual disturbance.   Respiratory: Negative for cough, chest tightness and shortness of breath.    Cardiovascular: Negative for chest pain, palpitations and leg swelling.   Gastrointestinal: Negative for abdominal pain, blood in stool, constipation, diarrhea, nausea and vomiting.   Endocrine: Negative for cold intolerance and heat intolerance.   Genitourinary: Negative for difficulty urinating, flank pain, pelvic pain and vaginal pain.   Musculoskeletal: Negative for arthralgias and myalgias.   Skin: Negative for rash.   Allergic/Immunologic: Negative for immunocompromised state.   Neurological: Negative for dizziness, weakness, light-headedness and headaches.   Hematological: Negative for adenopathy. Does not bruise/bleed easily.   Psychiatric/Behavioral: Negative for confusion, self-injury and suicidal ideas.       Past Medical History:   Diagnosis Date    Depression     Hypertension     Hypothyroidism     Kidney stones       Past Surgical History:   Procedure Laterality Date     SECTION         Family History   Problem Relation Age of Onset    Cancer Mother     COPD Mother     Heart disease Mother     Hypertension Mother     Depression Mother     Hearing loss Mother     Emphysema Father        Social History     Social History    Marital status:      Spouse name: N/A    Number of children: N/A    Years of education: N/A     Social History Main Topics    Smoking status: Never Smoker    Smokeless tobacco: Never Used    Alcohol use Yes    Drug use: No    Sexual activity: Not Asked     Other Topics Concern    None  "    Social History Narrative    None       Current Outpatient Prescriptions   Medication Sig Dispense Refill    amLODIPine (NORVASC) 5 MG tablet Take 1 tablet (5 mg total) by mouth once daily. 90 tablet 1    buPROPion (WELLBUTRIN XL) 300 MG 24 hr tablet Take 1 tablet (300 mg total) by mouth once daily. 90 tablet 1    BUTALBITAL COMPOUND W/CODEINE 11--40 mg Cap TAKE ONE CAPSULE BY MOUTH EVERY 4 HOURS AS NEEDED 60 capsule 0    clindamycin (CLEOCIN) 300 MG capsule Take 1 capsule (300 mg total) by mouth 3 (three) times daily. 30 capsule 0    diclofenac sodium (VOLTAREN) 1 % Gel 1 application 2 (two) times daily as needed.      escitalopram oxalate (LEXAPRO) 10 MG tablet Take 1 tablet (10 mg total) by mouth once daily. 90 tablet 1    levothyroxine (SYNTHROID) 125 MCG tablet Take 125 mcg by mouth once daily.      metoprolol tartrate (LOPRESSOR) 50 MG tablet Take 1 tablet (50 mg total) by mouth once daily. 90 tablet 1    olopatadine (PATANOL) 0.1 % ophthalmic solution 1 drop once daily at 6am.      triamcinolone (NASACORT) 55 mcg nasal inhaler 2 sprays by Nasal route once daily.      codeine-butalbital-ASA-caffeine 61--40 mg Cap Take 1 capsule by mouth every 4 (four) hours as needed. 60 capsule 0     No current facility-administered medications for this visit.        Review of patient's allergies indicates:   Allergen Reactions    Egg     Penicillins Rash    Sulfa (sulfonamide antibiotics) Rash     Objective:      Blood pressure 120/80, pulse 77, height 5' 5" (1.651 m), weight 65.3 kg (144 lb), SpO2 98 %. Body mass index is 23.96 kg/m².   Physical Exam   Constitutional: She is oriented to person, place, and time. She appears well-developed and well-nourished. She is cooperative. No distress.   HENT:   Head: Normocephalic and atraumatic.   Right Ear: Tympanic membrane normal.   Left Ear: Tympanic membrane normal.   Eyes: Conjunctivae, EOM and lids are normal. Pupils are equal, round, and reactive " to light. Lids are everted and swept, no foreign bodies found. Right pupil is round and reactive. Left pupil is round and reactive.   Neck: Trachea normal and normal range of motion. Neck supple.   Cardiovascular: Normal rate, regular rhythm, S1 normal, S2 normal, normal heart sounds and intact distal pulses.    Pulmonary/Chest: Breath sounds normal.   Abdominal: Soft. Bowel sounds are normal. There is no rigidity and no guarding.   Musculoskeletal: Normal range of motion.   Lymphadenopathy:     She has no cervical adenopathy.     She has no axillary adenopathy.   Neurological: She is alert and oriented to person, place, and time.   Skin: Skin is warm and dry. Capillary refill takes less than 2 seconds.   Psychiatric: She has a normal mood and affect. Her behavior is normal. Judgment and thought content normal.   Nursing note and vitals reviewed.          Assessment:       1. Migraine without aura and without status migrainosus, not intractable    2. Moderate major depression        Plan:       Eulalia was seen today for depression.    Diagnoses and all orders for this visit:    Migraine without aura and without status migrainosus, not intractable  -     codeine-butalbital-ASA-caffeine 92--40 mg Cap; Take 1 capsule by mouth every 4 (four) hours as needed.    Moderate major depression  -     buPROPion (WELLBUTRIN XL) 300 MG 24 hr tablet; Take 1 tablet (300 mg total) by mouth once daily.  -     escitalopram oxalate (LEXAPRO) 10 MG tablet; Take 1 tablet (10 mg total) by mouth once daily.

## 2018-04-23 DIAGNOSIS — G43.009 MIGRAINE WITHOUT AURA AND WITHOUT STATUS MIGRAINOSUS, NOT INTRACTABLE: ICD-10-CM

## 2018-04-24 RX ORDER — BUTALBITAL, ASPIRIN, CAFFEINE AND CODEINE PHOSPHATE 50; 325; 40; 30 MG/1; MG/1; MG/1; MG/1
CAPSULE ORAL
Qty: 60 CAPSULE | Refills: 0 | Status: SHIPPED | OUTPATIENT
Start: 2018-04-24 | End: 2018-05-28 | Stop reason: SDUPTHER

## 2018-05-28 DIAGNOSIS — G43.009 MIGRAINE WITHOUT AURA AND WITHOUT STATUS MIGRAINOSUS, NOT INTRACTABLE: ICD-10-CM

## 2018-05-29 RX ORDER — BUTALBITAL, ASPIRIN, CAFFEINE AND CODEINE PHOSPHATE 50; 325; 40; 30 MG/1; MG/1; MG/1; MG/1
CAPSULE ORAL
Qty: 60 CAPSULE | Refills: 0 | Status: SHIPPED | OUTPATIENT
Start: 2018-05-29 | End: 2018-06-18 | Stop reason: SDUPTHER

## 2018-06-18 DIAGNOSIS — G43.009 MIGRAINE WITHOUT AURA AND WITHOUT STATUS MIGRAINOSUS, NOT INTRACTABLE: ICD-10-CM

## 2018-06-18 RX ORDER — BUTALBITAL, ASPIRIN, CAFFEINE AND CODEINE PHOSPHATE 50; 325; 40; 30 MG/1; MG/1; MG/1; MG/1
CAPSULE ORAL
Qty: 60 CAPSULE | Refills: 0 | Status: SHIPPED | OUTPATIENT
Start: 2018-06-18 | End: 2018-07-18 | Stop reason: SDUPTHER

## 2018-07-18 DIAGNOSIS — G43.009 MIGRAINE WITHOUT AURA AND WITHOUT STATUS MIGRAINOSUS, NOT INTRACTABLE: ICD-10-CM

## 2018-07-20 RX ORDER — BUTALBITAL, ASPIRIN, CAFFEINE AND CODEINE PHOSPHATE 50; 325; 40; 30 MG/1; MG/1; MG/1; MG/1
CAPSULE ORAL
Qty: 60 CAPSULE | Refills: 0 | Status: SHIPPED | OUTPATIENT
Start: 2018-07-20 | End: 2018-08-20 | Stop reason: SDUPTHER

## 2018-07-24 ENCOUNTER — TELEPHONE (OUTPATIENT)
Dept: FAMILY MEDICINE | Facility: CLINIC | Age: 63
End: 2018-07-24

## 2018-07-24 NOTE — TELEPHONE ENCOUNTER
Pharmacy calling to clarify pts script for butalbital compound with codeine. They are asking if it's butalbital with aspirin or acetaminophen

## 2018-08-20 DIAGNOSIS — G43.009 MIGRAINE WITHOUT AURA AND WITHOUT STATUS MIGRAINOSUS, NOT INTRACTABLE: ICD-10-CM

## 2018-08-21 RX ORDER — BUTALBITAL, ASPIRIN, CAFFEINE AND CODEINE PHOSPHATE 50; 325; 40; 30 MG/1; MG/1; MG/1; MG/1
CAPSULE ORAL
Qty: 60 CAPSULE | Refills: 0 | Status: SHIPPED | OUTPATIENT
Start: 2018-08-21 | End: 2018-09-13 | Stop reason: SDUPTHER

## 2018-08-22 ENCOUNTER — TELEPHONE (OUTPATIENT)
Dept: FAMILY MEDICINE | Facility: CLINIC | Age: 63
End: 2018-08-22

## 2018-09-13 DIAGNOSIS — G43.009 MIGRAINE WITHOUT AURA AND WITHOUT STATUS MIGRAINOSUS, NOT INTRACTABLE: ICD-10-CM

## 2018-09-18 RX ORDER — BUTALBITAL, ASPIRIN, CAFFEINE AND CODEINE PHOSPHATE 50; 325; 40; 30 MG/1; MG/1; MG/1; MG/1
CAPSULE ORAL
Qty: 60 CAPSULE | Refills: 0 | Status: SHIPPED | OUTPATIENT
Start: 2018-09-18 | End: 2018-10-09 | Stop reason: SDUPTHER

## 2018-10-09 DIAGNOSIS — G43.009 MIGRAINE WITHOUT AURA AND WITHOUT STATUS MIGRAINOSUS, NOT INTRACTABLE: ICD-10-CM

## 2018-10-09 RX ORDER — BUTALBITAL, ASPIRIN, CAFFEINE AND CODEINE PHOSPHATE 50; 325; 40; 30 MG/1; MG/1; MG/1; MG/1
CAPSULE ORAL
Qty: 60 CAPSULE | Refills: 0 | Status: SHIPPED | OUTPATIENT
Start: 2018-10-09 | End: 2018-10-29 | Stop reason: SDUPTHER

## 2018-10-13 DIAGNOSIS — I10 HYPERTENSION, ESSENTIAL: ICD-10-CM

## 2018-10-15 RX ORDER — AMLODIPINE BESYLATE 5 MG/1
TABLET ORAL
Qty: 90 TABLET | Refills: 0 | Status: SHIPPED | OUTPATIENT
Start: 2018-10-15 | End: 2018-11-20 | Stop reason: SDUPTHER

## 2018-10-29 DIAGNOSIS — G43.009 MIGRAINE WITHOUT AURA AND WITHOUT STATUS MIGRAINOSUS, NOT INTRACTABLE: ICD-10-CM

## 2018-10-30 RX ORDER — BUTALBITAL, ASPIRIN, CAFFEINE AND CODEINE PHOSPHATE 50; 325; 40; 30 MG/1; MG/1; MG/1; MG/1
CAPSULE ORAL
Qty: 60 CAPSULE | Refills: 0 | Status: SHIPPED | OUTPATIENT
Start: 2018-10-30 | End: 2018-11-20 | Stop reason: SDUPTHER

## 2018-11-08 ENCOUNTER — TELEPHONE (OUTPATIENT)
Dept: FAMILY MEDICINE | Facility: CLINIC | Age: 63
End: 2018-11-08

## 2018-11-08 DIAGNOSIS — E03.4 HYPOTHYROIDISM DUE TO ACQUIRED ATROPHY OF THYROID: ICD-10-CM

## 2018-11-08 DIAGNOSIS — Z11.59 NEED FOR HEPATITIS C SCREENING TEST: ICD-10-CM

## 2018-11-08 DIAGNOSIS — I10 HYPERTENSION, ESSENTIAL: Primary | ICD-10-CM

## 2018-11-13 LAB
ALBUMIN SERPL-MCNC: 3.8 G/DL (ref 3.1–4.7)
ALP SERPL-CCNC: 89 IU/L (ref 40–104)
ALT (SGPT): 44 IU/L (ref 3–33)
AST SERPL-CCNC: 39 IU/L (ref 10–40)
BILIRUB SERPL-MCNC: 0.5 MG/DL (ref 0.3–1)
BUN SERPL-MCNC: 22 MG/DL (ref 8–20)
CALCIUM SERPL-MCNC: 9.1 MG/DL (ref 7.7–10.4)
CHLORIDE: 101 MMOL/L (ref 98–110)
CO2 SERPL-SCNC: 29.3 MMOL/L (ref 22.8–31.6)
CREATININE: 1.04 MG/DL (ref 0.6–1.4)
GLUCOSE: 96 MG/DL (ref 70–99)
POTASSIUM SERPL-SCNC: 4.1 MMOL/L (ref 3.5–5)
PROT SERPL-MCNC: 6.8 G/DL (ref 6–8.2)
SODIUM: 141 MMOL/L (ref 134–144)
T4 FREE SP9 P CHAL SERPL-SCNC: 0.95 NG/DL (ref 0.45–1.27)
TSH SERPL DL<=0.005 MIU/L-ACNC: 5.73 ULU/ML (ref 0.3–5.6)

## 2018-11-15 LAB — HCV AB SERPL QL IA: <0.1 S/CO RATIO (ref 0–0.9)

## 2018-11-20 ENCOUNTER — OFFICE VISIT (OUTPATIENT)
Dept: FAMILY MEDICINE | Facility: CLINIC | Age: 63
End: 2018-11-20
Payer: COMMERCIAL

## 2018-11-20 ENCOUNTER — TELEPHONE (OUTPATIENT)
Dept: FAMILY MEDICINE | Facility: CLINIC | Age: 63
End: 2018-11-20

## 2018-11-20 VITALS
HEART RATE: 74 BPM | HEIGHT: 65 IN | OXYGEN SATURATION: 97 % | SYSTOLIC BLOOD PRESSURE: 136 MMHG | BODY MASS INDEX: 24.32 KG/M2 | WEIGHT: 146 LBS | DIASTOLIC BLOOD PRESSURE: 92 MMHG

## 2018-11-20 DIAGNOSIS — G43.009 MIGRAINE WITHOUT AURA AND WITHOUT STATUS MIGRAINOSUS, NOT INTRACTABLE: ICD-10-CM

## 2018-11-20 DIAGNOSIS — I10 HYPERTENSION, ESSENTIAL: ICD-10-CM

## 2018-11-20 DIAGNOSIS — F32.1 MODERATE MAJOR DEPRESSION: ICD-10-CM

## 2018-11-20 PROCEDURE — 3075F SYST BP GE 130 - 139MM HG: CPT | Mod: ,,, | Performed by: FAMILY MEDICINE

## 2018-11-20 PROCEDURE — 3080F DIAST BP >= 90 MM HG: CPT | Mod: ,,, | Performed by: FAMILY MEDICINE

## 2018-11-20 PROCEDURE — 99214 OFFICE O/P EST MOD 30 MIN: CPT | Mod: ,,, | Performed by: FAMILY MEDICINE

## 2018-11-20 PROCEDURE — 3008F BODY MASS INDEX DOCD: CPT | Mod: ,,, | Performed by: FAMILY MEDICINE

## 2018-11-20 RX ORDER — AMLODIPINE BESYLATE 5 MG/1
5 TABLET ORAL DAILY
Qty: 90 TABLET | Refills: 0 | Status: SHIPPED | OUTPATIENT
Start: 2018-11-20 | End: 2019-03-26 | Stop reason: SDUPTHER

## 2018-11-20 RX ORDER — BUTALBITAL, ASPIRIN, CAFFEINE AND CODEINE PHOSPHATE 50; 325; 40; 30 MG/1; MG/1; MG/1; MG/1
1 CAPSULE ORAL EVERY 4 HOURS PRN
Qty: 60 CAPSULE | Refills: 2 | Status: SHIPPED | OUTPATIENT
Start: 2018-11-20 | End: 2019-01-15 | Stop reason: SDUPTHER

## 2018-11-20 RX ORDER — BUPROPION HYDROCHLORIDE 300 MG/1
300 TABLET ORAL DAILY
Qty: 90 TABLET | Refills: 1 | Status: SHIPPED | OUTPATIENT
Start: 2018-11-20 | End: 2020-01-28 | Stop reason: SDUPTHER

## 2018-11-20 RX ORDER — TRIAMCINOLONE ACETONIDE 55 UG/1
2 SPRAY, METERED NASAL DAILY
Qty: 1 G | Refills: 6 | Status: SHIPPED | OUTPATIENT
Start: 2018-11-20 | End: 2020-12-21 | Stop reason: SDUPTHER

## 2018-11-20 RX ORDER — LEVOTHYROXINE SODIUM 112 UG/1
112 TABLET ORAL DAILY
Qty: 90 TABLET | Refills: 1 | Status: SHIPPED | OUTPATIENT
Start: 2018-11-20 | End: 2020-01-28

## 2018-11-20 RX ORDER — METOPROLOL TARTRATE 50 MG/1
50 TABLET ORAL DAILY
Qty: 90 TABLET | Refills: 1 | Status: SHIPPED | OUTPATIENT
Start: 2018-11-20 | End: 2019-01-07 | Stop reason: SDUPTHER

## 2018-11-20 RX ORDER — ESCITALOPRAM OXALATE 10 MG/1
10 TABLET ORAL DAILY
Qty: 90 TABLET | Refills: 1 | Status: SHIPPED | OUTPATIENT
Start: 2018-11-20 | End: 2020-01-13 | Stop reason: SDUPTHER

## 2018-11-20 RX ORDER — OLOPATADINE HYDROCHLORIDE 1 MG/ML
1 SOLUTION/ DROPS OPHTHALMIC 2 TIMES DAILY
Qty: 3 ML | Refills: 6 | Status: SHIPPED | OUTPATIENT
Start: 2018-11-20 | End: 2020-09-21

## 2018-11-20 NOTE — PATIENT INSTRUCTIONS
Depression  Depression is one of the most common mental health problems today. It is not just a state of unhappiness or sadness. It is a true disease. The cause seems to be related to a decrease in chemicals that transmit signals in the brain. Having a family history of depression, alcoholism, or suicide increases the risk. Chronic illness, chronic pain, migraine headaches and high emotional stress also increase the risk.  Depression is something we tend to recognize in others, but may have a hard time seeing in ourselves. It can show in many physical and emotional ways:  · Loss of appetite  · Over-eating  · Not being able to sleep  · Sleeping too much  · Tiredness not related to physical exertion  · Restlessness or irritability  · Slowness of movement or speech  · Feeling depressed or withdrawn  · Loss of interest in things you once enjoyed  · Trouble concentrating, poor memory, trouble making decisions  · Thoughts of harming or killing oneself, or thoughts that life is not worth living  · Low self-esteem  The treatment for depression may include both medicine and psychotherapy. Antidepressants can reduce suffering and can improve the ability to function during the depressed period. Therapy can offer emotional support and help you understand emotional factors that may be causing the depression.  Home care  · On-going care and support helps people manage this disease.  Find a healthcare provider and therapist who meet your needs. Seek help when you feel like you may be getting ill.  · Be kind to yourself. Make it a point to do things that you enjoy (gardening, walking in nature, going to a movie, etc.). Reward yourself for small successes.  · Take care of your physical body. Eat a balanced diet (low in saturated fat and high in fruits and vegetables). Exercise at least 3 times a week for 30 minutes. Even mild-moderate exercise (like brisk walking) can make you feel better.  · Avoid alcohol, which can make  depression worse.  · Take medicine as prescribed.  · Tell each of your healthcare providers about all of the prescription drugs, over-the-counter medicines, vitamins, and supplements you take. Certain supplements interact with medicines and can result in dangerous side effects. Ask your pharmacist when you have questions about drug interactions.  · Talk with your family and trusted friends about your feelings and thoughts. Ask them to help you recognize behavior changes early so you can get help and, if needed, medicine can be adjusted.  Follow-up care  Follow up with your healthcare provider, or as advised.  Call 911  Call 911 if you:  · Have suicidal thoughts, a suicide plan, and the means to carry out the plan  · Have trouble breathing  · Are very confused  · Feel very drowsy or have trouble awakening  · Faint or lose consciousness  · Have new chest pain that becomes more severe, lasts longer, or spreads into your shoulder, arm, neck, jaw or back  When to seek medical advice  Call your healthcare provider right away if any of these occur:  · Feeling extreme depression, fear, anxiety, or anger toward yourself or others  · Feeling out of control  · Feeling that you may try to harm yourself or another  · Hearing voices that others do not hear  · Seeing things that others do not see  · Cant sleep or eat for 3 days in a row  · Friends or family express concern over your behavior and ask you to seek help  Date Last Reviewed: 9/29/2015  © 7254-0352 [employer]. [address] [place] [address], [place] [address]. All rights reserved. This information is not intended as a substitute for professional medical care. Always follow your healthcare professional's instructions.

## 2018-11-20 NOTE — PROGRESS NOTES
Subjective:       Patient ID: Eulalia Bermudez is a 63 y.o. female.    Chief Complaint: Anxiety (6m f/u)    Feeling well, nonsuicidal, no pain or fever      Review of Systems   Constitutional: Negative for appetite change, chills, diaphoresis, fatigue and fever.   HENT: Negative for congestion, ear pain, hearing loss, sore throat and trouble swallowing.    Eyes: Negative for photophobia, pain and visual disturbance.   Respiratory: Negative for cough, chest tightness and shortness of breath.    Cardiovascular: Negative for chest pain, palpitations and leg swelling.   Gastrointestinal: Negative for abdominal pain, blood in stool, constipation, diarrhea, nausea and vomiting.   Endocrine: Negative for cold intolerance and heat intolerance.   Genitourinary: Negative for difficulty urinating, flank pain, pelvic pain and vaginal pain.   Musculoskeletal: Negative for arthralgias and myalgias.   Skin: Negative for rash.   Allergic/Immunologic: Negative for immunocompromised state.   Neurological: Negative for dizziness, weakness, light-headedness and headaches.   Hematological: Negative for adenopathy. Does not bruise/bleed easily.   Psychiatric/Behavioral: Negative for confusion, self-injury and suicidal ideas.       Past Medical History:   Diagnosis Date    Depression     Hypertension     Hypothyroidism     Kidney stones       Past Surgical History:   Procedure Laterality Date     SECTION         Family History   Problem Relation Age of Onset    Cancer Mother     COPD Mother     Heart disease Mother     Hypertension Mother     Depression Mother     Hearing loss Mother     Emphysema Father        Social History     Socioeconomic History    Marital status:      Spouse name: None    Number of children: None    Years of education: None    Highest education level: None   Social Needs    Financial resource strain: None    Food insecurity - worry: None    Food insecurity - inability: None     "Transportation needs - medical: None    Transportation needs - non-medical: None   Occupational History    None   Tobacco Use    Smoking status: Never Smoker    Smokeless tobacco: Never Used   Substance and Sexual Activity    Alcohol use: Yes    Drug use: No    Sexual activity: None   Other Topics Concern    None   Social History Narrative    None       Current Outpatient Medications   Medication Sig Dispense Refill    amLODIPine (NORVASC) 5 MG tablet Take 1 tablet (5 mg total) by mouth once daily. 90 tablet 0    buPROPion (WELLBUTRIN XL) 300 MG 24 hr tablet Take 1 tablet (300 mg total) by mouth once daily. 90 tablet 1    codeine-butalbital-ASA-caffeine (BUTALBITAL COMPOUND W/CODEINE) 31--40 mg Cap Take 1 capsule by mouth every 4 (four) hours as needed. 60 capsule 2    diclofenac sodium (VOLTAREN) 1 % Gel 1 application 2 (two) times daily as needed.      escitalopram oxalate (LEXAPRO) 10 MG tablet Take 1 tablet (10 mg total) by mouth once daily. 90 tablet 1    levothyroxine (SYNTHROID) 112 MCG tablet Take 1 tablet (112 mcg total) by mouth once daily. 90 tablet 1    metoprolol tartrate (LOPRESSOR) 50 MG tablet Take 1 tablet (50 mg total) by mouth once daily. 90 tablet 1    olopatadine (PATANOL) 0.1 % ophthalmic solution Place 1 drop into both eyes 2 (two) times daily. 3 mL 6    triamcinolone (NASACORT) 55 mcg nasal inhaler 2 sprays by Nasal route once daily. 1 g 6     No current facility-administered medications for this visit.        Review of patient's allergies indicates:   Allergen Reactions    Egg     Penicillins Rash    Sulfa (sulfonamide antibiotics) Rash     Objective:    HPI     Anxiety      Additional comments: 6m f/u          Last edited by Reyna Nolasco LPN on 11/20/2018 11:08 AM. (History)      Blood pressure (!) 138/96, pulse 74, height 5' 5" (1.651 m), weight 66.2 kg (146 lb), SpO2 97 %. Body mass index is 24.3 kg/m².   Physical Exam   Constitutional: She is oriented to " person, place, and time. She appears well-developed and well-nourished. She is cooperative. No distress.   HENT:   Head: Normocephalic and atraumatic.   Right Ear: Tympanic membrane normal.   Left Ear: Tympanic membrane normal.   Eyes: Conjunctivae, EOM and lids are normal. Pupils are equal, round, and reactive to light. Lids are everted and swept, no foreign bodies found. Right pupil is round and reactive. Left pupil is round and reactive.   Neck: Trachea normal and normal range of motion. Neck supple.   Cardiovascular: Normal rate, regular rhythm, S1 normal, S2 normal, normal heart sounds and intact distal pulses.   Pulmonary/Chest: Breath sounds normal.   Abdominal: There is no rigidity and no guarding.   Musculoskeletal: Normal range of motion.   Lymphadenopathy:     She has no cervical adenopathy.     She has no axillary adenopathy.   Neurological: She is alert and oriented to person, place, and time.   Skin: Skin is warm and dry. Capillary refill takes less than 2 seconds.   Psychiatric: She has a normal mood and affect. Her behavior is normal. Judgment and thought content normal.   Nursing note and vitals reviewed.          Assessment:       1. Migraine without aura and without status migrainosus, not intractable    2. Hypertension, essential    3. Moderate major depression        Plan:       Eulalia was seen today for anxiety.    Diagnoses and all orders for this visit:    Migraine without aura and without status migrainosus, not intractable  -     codeine-butalbital-ASA-caffeine (BUTALBITAL COMPOUND W/CODEINE) 24--40 mg Cap; Take 1 capsule by mouth every 4 (four) hours as needed.    Hypertension, essential  -     metoprolol tartrate (LOPRESSOR) 50 MG tablet; Take 1 tablet (50 mg total) by mouth once daily.  -     amLODIPine (NORVASC) 5 MG tablet; Take 1 tablet (5 mg total) by mouth once daily.    Moderate major depression  -     escitalopram oxalate (LEXAPRO) 10 MG tablet; Take 1 tablet (10 mg total)  by mouth once daily.  -     buPROPion (WELLBUTRIN XL) 300 MG 24 hr tablet; Take 1 tablet (300 mg total) by mouth once daily.    Other orders  -     triamcinolone (NASACORT) 55 mcg nasal inhaler; 2 sprays by Nasal route once daily.  -     olopatadine (PATANOL) 0.1 % ophthalmic solution; Place 1 drop into both eyes 2 (two) times daily.  -     levothyroxine (SYNTHROID) 112 MCG tablet; Take 1 tablet (112 mcg total) by mouth once daily.    Beba 6m  She refused to let me increase her dose of synthroid, wants to talk to Endo

## 2018-11-20 NOTE — TELEPHONE ENCOUNTER
darrell faxed over saying olopatadine opth soln is  Not covered under insurance.  Needs to be changed to Lastacaftsol opth soln instead.

## 2018-11-21 NOTE — TELEPHONE ENCOUNTER
The following medication needs a prior authorization:     Medication Name: olopatadine 0.1% ophthalmic solution    Dosage: 1 drop    Frequency: 2 times daily    Directions for use: place 1 drop into both eyes two times daily    Diagnosis: H10.13 Bilaral allergic conjunctivitis    Is the request for a reauthorization? no    Is the patient currently stable on therapy? yes    Please list all therapeutic alternatives previously used with start/end dates and outcome:

## 2019-01-07 DIAGNOSIS — I10 HYPERTENSION, ESSENTIAL: ICD-10-CM

## 2019-01-07 RX ORDER — METOPROLOL TARTRATE 50 MG/1
50 TABLET ORAL DAILY
Qty: 90 TABLET | Refills: 1 | Status: SHIPPED | OUTPATIENT
Start: 2019-01-07 | End: 2020-01-13 | Stop reason: SDUPTHER

## 2019-01-14 RX ORDER — DICLOFENAC SODIUM 10 MG/G
GEL TOPICAL 2 TIMES DAILY PRN
Qty: 100 G | Refills: 0 | Status: SHIPPED | OUTPATIENT
Start: 2019-01-14 | End: 2020-01-28 | Stop reason: SDUPTHER

## 2019-01-15 DIAGNOSIS — G43.009 MIGRAINE WITHOUT AURA AND WITHOUT STATUS MIGRAINOSUS, NOT INTRACTABLE: ICD-10-CM

## 2019-01-16 RX ORDER — BUTALBITAL, ASPIRIN, CAFFEINE AND CODEINE PHOSPHATE 50; 325; 40; 30 MG/1; MG/1; MG/1; MG/1
CAPSULE ORAL
Qty: 60 CAPSULE | Refills: 0 | Status: SHIPPED | OUTPATIENT
Start: 2019-01-16 | End: 2019-02-05 | Stop reason: SDUPTHER

## 2019-02-05 DIAGNOSIS — G43.009 MIGRAINE WITHOUT AURA AND WITHOUT STATUS MIGRAINOSUS, NOT INTRACTABLE: ICD-10-CM

## 2019-02-07 RX ORDER — BUTALBITAL, ASPIRIN, CAFFEINE AND CODEINE PHOSPHATE 50; 325; 40; 30 MG/1; MG/1; MG/1; MG/1
CAPSULE ORAL
Qty: 60 CAPSULE | Refills: 0 | Status: SHIPPED | OUTPATIENT
Start: 2019-02-07 | End: 2019-02-25 | Stop reason: SDUPTHER

## 2019-02-25 DIAGNOSIS — G43.009 MIGRAINE WITHOUT AURA AND WITHOUT STATUS MIGRAINOSUS, NOT INTRACTABLE: ICD-10-CM

## 2019-02-25 RX ORDER — BUTALBITAL, ASPIRIN, CAFFEINE AND CODEINE PHOSPHATE 50; 325; 40; 30 MG/1; MG/1; MG/1; MG/1
CAPSULE ORAL
Qty: 60 CAPSULE | Refills: 0 | Status: SHIPPED | OUTPATIENT
Start: 2019-02-25 | End: 2019-02-27 | Stop reason: SDUPTHER

## 2019-02-27 DIAGNOSIS — G43.009 MIGRAINE WITHOUT AURA AND WITHOUT STATUS MIGRAINOSUS, NOT INTRACTABLE: ICD-10-CM

## 2019-02-28 RX ORDER — BUTALBITAL, ASPIRIN, CAFFEINE AND CODEINE PHOSPHATE 50; 325; 40; 30 MG/1; MG/1; MG/1; MG/1
CAPSULE ORAL
Qty: 60 CAPSULE | Refills: 0 | Status: SHIPPED | OUTPATIENT
Start: 2019-02-28 | End: 2019-03-18 | Stop reason: SDUPTHER

## 2019-03-18 DIAGNOSIS — G43.009 MIGRAINE WITHOUT AURA AND WITHOUT STATUS MIGRAINOSUS, NOT INTRACTABLE: ICD-10-CM

## 2019-03-19 RX ORDER — BUTALBITAL, ASPIRIN, CAFFEINE AND CODEINE PHOSPHATE 50; 325; 40; 30 MG/1; MG/1; MG/1; MG/1
CAPSULE ORAL
Qty: 60 CAPSULE | Refills: 0 | Status: SHIPPED | OUTPATIENT
Start: 2019-03-19 | End: 2019-10-01 | Stop reason: SDUPTHER

## 2019-03-26 DIAGNOSIS — I10 HYPERTENSION, ESSENTIAL: ICD-10-CM

## 2019-03-26 RX ORDER — AMLODIPINE BESYLATE 5 MG/1
TABLET ORAL
Qty: 90 TABLET | Refills: 0 | Status: SHIPPED | OUTPATIENT
Start: 2019-03-26 | End: 2020-01-28 | Stop reason: SDUPTHER

## 2019-04-09 RX ORDER — BUTALBITAL, ACETAMINOPHEN, CAFFEINE AND CODEINE PHOSPHATE 50; 325; 40; 30 MG/1; MG/1; MG/1; MG/1
CAPSULE ORAL
Qty: 30 EACH | Refills: 0 | Status: SHIPPED | OUTPATIENT
Start: 2019-04-09 | End: 2019-10-01 | Stop reason: SDUPTHER

## 2019-07-30 DIAGNOSIS — Z12.39 SCREENING BREAST EXAMINATION: Primary | ICD-10-CM

## 2019-08-13 ENCOUNTER — HOSPITAL ENCOUNTER (OUTPATIENT)
Dept: RADIOLOGY | Facility: HOSPITAL | Age: 64
Discharge: HOME | End: 2019-08-13
Attending: NURSE PRACTITIONER
Payer: COMMERCIAL

## 2019-08-13 VITALS — BODY MASS INDEX: 24.32 KG/M2 | HEIGHT: 65 IN | WEIGHT: 146 LBS

## 2019-08-13 DIAGNOSIS — Z12.39 SCREENING BREAST EXAMINATION: ICD-10-CM

## 2019-08-13 PROCEDURE — 77067 SCR MAMMO BI INCL CAD: CPT | Mod: TC

## 2019-08-20 ENCOUNTER — LAB VISIT (OUTPATIENT)
Dept: LAB | Facility: HOSPITAL | Age: 64
End: 2019-08-20
Attending: INTERNAL MEDICINE
Payer: COMMERCIAL

## 2019-08-20 DIAGNOSIS — Z79.899 ENCOUNTER FOR LONG-TERM (CURRENT) USE OF HIGH-RISK MEDICATION: ICD-10-CM

## 2019-08-20 DIAGNOSIS — E03.8 HYPOTHYROIDISM DUE TO FIBROUS INVASIVE THYROIDITIS: Primary | ICD-10-CM

## 2019-08-20 DIAGNOSIS — E06.5 HYPOTHYROIDISM DUE TO FIBROUS INVASIVE THYROIDITIS: Primary | ICD-10-CM

## 2019-08-20 DIAGNOSIS — E55.9 VITAMIN D DEFICIENCY: ICD-10-CM

## 2019-08-20 DIAGNOSIS — E06.5 HYPOTHYROIDISM DUE TO FIBROUS INVASIVE THYROIDITIS: ICD-10-CM

## 2019-08-20 DIAGNOSIS — E03.8 HYPOTHYROIDISM DUE TO FIBROUS INVASIVE THYROIDITIS: ICD-10-CM

## 2019-08-20 LAB
25(OH)D3+25(OH)D2 SERPL-MCNC: 43 NG/ML (ref 30–96)
ANION GAP SERPL CALC-SCNC: 7 MMOL/L (ref 8–16)
BUN SERPL-MCNC: 22 MG/DL (ref 8–23)
CALCIUM SERPL-MCNC: 9 MG/DL (ref 8.7–10.5)
CHLORIDE SERPL-SCNC: 103 MMOL/L (ref 95–110)
CO2 SERPL-SCNC: 27 MMOL/L (ref 23–29)
CREAT SERPL-MCNC: 1 MG/DL (ref 0.5–1.4)
EST. GFR  (AFRICAN AMERICAN): >60 ML/MIN/1.73 M^2
EST. GFR  (NON AFRICAN AMERICAN): 59.7 ML/MIN/1.73 M^2
GLUCOSE SERPL-MCNC: 96 MG/DL (ref 70–110)
POTASSIUM SERPL-SCNC: 4.3 MMOL/L (ref 3.5–5.1)
SODIUM SERPL-SCNC: 137 MMOL/L (ref 136–145)
T4 FREE SERPL-MCNC: 1.01 NG/DL (ref 0.71–1.51)
TSH SERPL DL<=0.005 MIU/L-ACNC: 1.49 UIU/ML (ref 0.34–5.6)

## 2019-08-20 PROCEDURE — 84443 ASSAY THYROID STIM HORMONE: CPT

## 2019-08-20 PROCEDURE — 80048 BASIC METABOLIC PNL TOTAL CA: CPT

## 2019-08-20 PROCEDURE — 84439 ASSAY OF FREE THYROXINE: CPT

## 2019-08-20 PROCEDURE — 36415 COLL VENOUS BLD VENIPUNCTURE: CPT

## 2019-08-20 PROCEDURE — 82306 VITAMIN D 25 HYDROXY: CPT

## 2019-10-01 DIAGNOSIS — G43.009 MIGRAINE WITHOUT AURA AND WITHOUT STATUS MIGRAINOSUS, NOT INTRACTABLE: Primary | ICD-10-CM

## 2019-10-01 RX ORDER — BUTALBITAL, ACETAMINOPHEN, CAFFEINE AND CODEINE PHOSPHATE 50; 325; 40; 30 MG/1; MG/1; MG/1; MG/1
1 CAPSULE ORAL 2 TIMES DAILY PRN
Qty: 60 EACH | Refills: 0 | Status: SHIPPED | OUTPATIENT
Start: 2019-10-01 | End: 2019-10-31 | Stop reason: SDUPTHER

## 2019-10-01 RX ORDER — BUTALBITAL, ASPIRIN, CAFFEINE AND CODEINE PHOSPHATE 50; 325; 40; 30 MG/1; MG/1; MG/1; MG/1
1 CAPSULE ORAL EVERY 6 HOURS PRN
Qty: 60 CAPSULE | Refills: 0 | Status: SHIPPED | OUTPATIENT
Start: 2019-10-01 | End: 2019-11-01 | Stop reason: SDUPTHER

## 2019-10-01 NOTE — TELEPHONE ENCOUNTER
----- Message from Monik Jaramillo sent at 10/1/2019 10:43 AM CDT -----  [facility]  [phone]

## 2019-10-31 ENCOUNTER — TELEPHONE (OUTPATIENT)
Dept: FAMILY MEDICINE | Facility: CLINIC | Age: 64
End: 2019-10-31

## 2019-10-31 DIAGNOSIS — G43.009 MIGRAINE WITHOUT AURA AND WITHOUT STATUS MIGRAINOSUS, NOT INTRACTABLE: ICD-10-CM

## 2019-10-31 RX ORDER — BUTALBITAL, ACETAMINOPHEN, CAFFEINE AND CODEINE PHOSPHATE 50; 325; 40; 30 MG/1; MG/1; MG/1; MG/1
1 CAPSULE ORAL 2 TIMES DAILY PRN
Qty: 60 EACH | Refills: 0 | Status: SHIPPED | OUTPATIENT
Start: 2019-10-31 | End: 2019-10-31 | Stop reason: SDUPTHER

## 2019-10-31 RX ORDER — BUTALBITAL, ACETAMINOPHEN, CAFFEINE AND CODEINE PHOSPHATE 50; 325; 40; 30 MG/1; MG/1; MG/1; MG/1
1 CAPSULE ORAL 2 TIMES DAILY PRN
Qty: 60 EACH | Refills: 0 | Status: SHIPPED | OUTPATIENT
Start: 2019-10-31 | End: 2019-11-01

## 2019-10-31 NOTE — TELEPHONE ENCOUNTER
----- Message from Bella Bernard sent at 10/31/2019  3:30 PM CDT -----  Contact: Aidan Esteban  #Wrong Rx sent to pharmacy##    Please send over codeine-butalbital-ASA-caffeine (BUTALBITAL COMPOUND W/CODEINE) 75--40 mg Cap and cancel the one that was sent over with the Acetaminophen .   Leyla yan [facility]  Pt# [phone]

## 2019-10-31 NOTE — TELEPHONE ENCOUNTER
----- Message from Chelita Gao sent at 10/31/2019 11:08 AM CDT -----  vm- patient needs a refill of butabatol caffeine

## 2019-11-01 DIAGNOSIS — G43.009 MIGRAINE WITHOUT AURA AND WITHOUT STATUS MIGRAINOSUS, NOT INTRACTABLE: ICD-10-CM

## 2019-11-01 RX ORDER — BUTALBITAL, ASPIRIN, CAFFEINE AND CODEINE PHOSPHATE 50; 325; 40; 30 MG/1; MG/1; MG/1; MG/1
1 CAPSULE ORAL EVERY 6 HOURS PRN
Qty: 60 CAPSULE | Refills: 0 | Status: SHIPPED | OUTPATIENT
Start: 2019-11-01 | End: 2019-12-02 | Stop reason: SDUPTHER

## 2019-11-01 NOTE — TELEPHONE ENCOUNTER
----- Message from Michael Salinas sent at 11/1/2019  9:32 AM CDT -----  Pt is saying that she needs butalbital asprin sent in   Pharm [facility]   Pt [phone]

## 2019-12-02 DIAGNOSIS — G43.009 MIGRAINE WITHOUT AURA AND WITHOUT STATUS MIGRAINOSUS, NOT INTRACTABLE: ICD-10-CM

## 2019-12-02 RX ORDER — BUTALBITAL, ASPIRIN, CAFFEINE AND CODEINE PHOSPHATE 50; 325; 40; 30 MG/1; MG/1; MG/1; MG/1
1 CAPSULE ORAL EVERY 6 HOURS PRN
Qty: 60 CAPSULE | Refills: 0 | Status: SHIPPED | OUTPATIENT
Start: 2019-12-02 | End: 2019-12-30 | Stop reason: SDUPTHER

## 2019-12-02 NOTE — TELEPHONE ENCOUNTER
----- Message from Monik Jaramillo sent at 12/2/2019  9:58 AM CST -----  [facility]  [phone]

## 2019-12-30 DIAGNOSIS — G43.009 MIGRAINE WITHOUT AURA AND WITHOUT STATUS MIGRAINOSUS, NOT INTRACTABLE: ICD-10-CM

## 2019-12-30 RX ORDER — BUTALBITAL, ASPIRIN, CAFFEINE AND CODEINE PHOSPHATE 50; 325; 40; 30 MG/1; MG/1; MG/1; MG/1
1 CAPSULE ORAL EVERY 6 HOURS PRN
Qty: 60 CAPSULE | Refills: 0 | Status: SHIPPED | OUTPATIENT
Start: 2019-12-30 | End: 2020-01-28 | Stop reason: SDUPTHER

## 2019-12-30 NOTE — TELEPHONE ENCOUNTER
----- Message from Monik Jaramillo sent at 12/30/2019  9:29 AM CST -----  vm- pt needs refill on butalbital  [phone]

## 2020-01-13 DIAGNOSIS — F32.1 MODERATE MAJOR DEPRESSION: ICD-10-CM

## 2020-01-13 DIAGNOSIS — I10 HYPERTENSION, ESSENTIAL: ICD-10-CM

## 2020-01-13 RX ORDER — METOPROLOL TARTRATE 50 MG/1
50 TABLET ORAL DAILY
Qty: 90 TABLET | Refills: 1 | Status: SHIPPED | OUTPATIENT
Start: 2020-01-13 | End: 2020-01-28 | Stop reason: SDUPTHER

## 2020-01-13 RX ORDER — ESCITALOPRAM OXALATE 10 MG/1
10 TABLET ORAL DAILY
Qty: 90 TABLET | Refills: 1 | Status: SHIPPED | OUTPATIENT
Start: 2020-01-13 | End: 2020-01-28 | Stop reason: SDUPTHER

## 2020-01-13 NOTE — TELEPHONE ENCOUNTER
----- Message from Chelita Gao sent at 1/13/2020 11:50 AM CST -----  Patient needs a refill meter toproll and lexapro didn't state the pharmacy   Patients call back number is [phone]

## 2020-01-14 ENCOUNTER — TELEPHONE (OUTPATIENT)
Dept: FAMILY MEDICINE | Facility: CLINIC | Age: 65
End: 2020-01-14

## 2020-01-14 DIAGNOSIS — E03.4 HYPOTHYROIDISM DUE TO ACQUIRED ATROPHY OF THYROID: ICD-10-CM

## 2020-01-14 DIAGNOSIS — I10 HYPERTENSION, ESSENTIAL: ICD-10-CM

## 2020-01-14 DIAGNOSIS — Z79.899 ENCOUNTER FOR LONG-TERM (CURRENT) USE OF OTHER MEDICATIONS: ICD-10-CM

## 2020-01-14 DIAGNOSIS — Z00.00 ROUTINE GENERAL MEDICAL EXAMINATION AT A HEALTH CARE FACILITY: Primary | ICD-10-CM

## 2020-01-21 ENCOUNTER — TELEPHONE (OUTPATIENT)
Dept: FAMILY MEDICINE | Facility: CLINIC | Age: 65
End: 2020-01-21

## 2020-01-21 DIAGNOSIS — Z79.899 ENCOUNTER FOR LONG-TERM (CURRENT) USE OF OTHER MEDICATIONS: ICD-10-CM

## 2020-01-21 DIAGNOSIS — I10 HYPERTENSION, ESSENTIAL: ICD-10-CM

## 2020-01-21 DIAGNOSIS — Z00.00 ROUTINE GENERAL MEDICAL EXAMINATION AT A HEALTH CARE FACILITY: Primary | ICD-10-CM

## 2020-01-21 DIAGNOSIS — E03.4 HYPOTHYROIDISM DUE TO ACQUIRED ATROPHY OF THYROID: ICD-10-CM

## 2020-01-21 NOTE — TELEPHONE ENCOUNTER
Pt called back, wants her lab orders to [facility]. Orders put back in for [facility] to be signed by Lizy.

## 2020-01-21 NOTE — TELEPHONE ENCOUNTER
----- Message from Monik Jaramillo sent at 1/21/2020 10:57 AM CST -----  - pt has an appt next week and would like to know if she needs bloodwork done before   [phone]

## 2020-01-22 ENCOUNTER — LAB VISIT (OUTPATIENT)
Dept: LAB | Facility: HOSPITAL | Age: 65
End: 2020-01-22
Attending: NURSE PRACTITIONER
Payer: COMMERCIAL

## 2020-01-22 DIAGNOSIS — E03.4 HYPOTHYROIDISM DUE TO ACQUIRED ATROPHY OF THYROID: ICD-10-CM

## 2020-01-22 DIAGNOSIS — I10 HYPERTENSION, ESSENTIAL: ICD-10-CM

## 2020-01-22 DIAGNOSIS — Z00.00 ROUTINE GENERAL MEDICAL EXAMINATION AT A HEALTH CARE FACILITY: ICD-10-CM

## 2020-01-22 DIAGNOSIS — Z79.899 ENCOUNTER FOR LONG-TERM (CURRENT) USE OF OTHER MEDICATIONS: ICD-10-CM

## 2020-01-22 LAB
ALBUMIN SERPL BCP-MCNC: 3.6 G/DL (ref 3.5–5.2)
ALBUMIN/CREAT UR: 8 UG/MG (ref 0–30)
ALP SERPL-CCNC: 67 U/L (ref 55–135)
ALT SERPL W/O P-5'-P-CCNC: 42 U/L (ref 10–44)
ANION GAP SERPL CALC-SCNC: 8 MMOL/L (ref 8–16)
AST SERPL-CCNC: 32 U/L (ref 10–40)
BACTERIA #/AREA URNS HPF: NEGATIVE /HPF
BASOPHILS # BLD AUTO: 0.03 K/UL (ref 0–0.2)
BASOPHILS NFR BLD: 0.6 % (ref 0–1.9)
BILIRUB SERPL-MCNC: 0.7 MG/DL (ref 0.1–1)
BILIRUB UR QL STRIP: NEGATIVE
BUN SERPL-MCNC: 20 MG/DL (ref 8–23)
CALCIUM SERPL-MCNC: 8.8 MG/DL (ref 8.7–10.5)
CHLORIDE SERPL-SCNC: 105 MMOL/L (ref 95–110)
CHOLEST SERPL-MCNC: 236 MG/DL (ref 120–199)
CHOLEST/HDLC SERPL: 3 {RATIO} (ref 2–5)
CLARITY UR: CLEAR
CO2 SERPL-SCNC: 27 MMOL/L (ref 23–29)
COLOR UR: YELLOW
CREAT SERPL-MCNC: 0.8 MG/DL (ref 0.5–1.4)
CREAT UR-MCNC: 107 MG/DL (ref 15–325)
DIFFERENTIAL METHOD: ABNORMAL
EOSINOPHIL # BLD AUTO: 0.1 K/UL (ref 0–0.5)
EOSINOPHIL NFR BLD: 1.9 % (ref 0–8)
ERYTHROCYTE [DISTWIDTH] IN BLOOD BY AUTOMATED COUNT: 11.6 % (ref 11.5–14.5)
EST. GFR  (AFRICAN AMERICAN): >60 ML/MIN/1.73 M^2
EST. GFR  (NON AFRICAN AMERICAN): >60 ML/MIN/1.73 M^2
GLUCOSE SERPL-MCNC: 97 MG/DL (ref 70–110)
GLUCOSE UR QL STRIP: NEGATIVE
HCT VFR BLD AUTO: 41.4 % (ref 37–48.5)
HDLC SERPL-MCNC: 78 MG/DL (ref 40–75)
HDLC SERPL: 33.1 % (ref 20–50)
HGB BLD-MCNC: 13.4 G/DL (ref 12–16)
HGB UR QL STRIP: ABNORMAL
HYALINE CASTS #/AREA URNS LPF: 6 /LPF
IMM GRANULOCYTES # BLD AUTO: 0.01 K/UL (ref 0–0.04)
IMM GRANULOCYTES NFR BLD AUTO: 0.2 % (ref 0–0.5)
KETONES UR QL STRIP: NEGATIVE
LDLC SERPL CALC-MCNC: 137.8 MG/DL (ref 63–159)
LEUKOCYTE ESTERASE UR QL STRIP: ABNORMAL
LYMPHOCYTES # BLD AUTO: 1.6 K/UL (ref 1–4.8)
LYMPHOCYTES NFR BLD: 33.5 % (ref 18–48)
MCH RBC QN AUTO: 34.1 PG (ref 27–31)
MCHC RBC AUTO-ENTMCNC: 32.4 G/DL (ref 32–36)
MCV RBC AUTO: 105 FL (ref 82–98)
MICROALBUMIN UR DL<=1MG/L-MCNC: 8.6 UG/ML
MICROSCOPIC COMMENT: ABNORMAL
MONOCYTES # BLD AUTO: 0.5 K/UL (ref 0.3–1)
MONOCYTES NFR BLD: 10.1 % (ref 4–15)
NEUTROPHILS # BLD AUTO: 2.5 K/UL (ref 1.8–7.7)
NEUTROPHILS NFR BLD: 53.7 % (ref 38–73)
NITRITE UR QL STRIP: NEGATIVE
NONHDLC SERPL-MCNC: 158 MG/DL
NRBC BLD-RTO: 0 /100 WBC
PH UR STRIP: 6 [PH] (ref 5–8)
PLATELET # BLD AUTO: 221 K/UL (ref 150–350)
PMV BLD AUTO: 10.4 FL (ref 9.2–12.9)
POTASSIUM SERPL-SCNC: 4.1 MMOL/L (ref 3.5–5.1)
PROT SERPL-MCNC: 6.3 G/DL (ref 6–8.4)
PROT UR QL STRIP: NEGATIVE
RBC # BLD AUTO: 3.93 M/UL (ref 4–5.4)
RBC #/AREA URNS HPF: 6 /HPF (ref 0–4)
SODIUM SERPL-SCNC: 140 MMOL/L (ref 136–145)
SP GR UR STRIP: 1.02 (ref 1–1.03)
SQUAMOUS #/AREA URNS HPF: 1 /HPF
T4 FREE SERPL-MCNC: 1.18 NG/DL (ref 0.71–1.51)
TRIGL SERPL-MCNC: 101 MG/DL (ref 30–150)
TSH SERPL DL<=0.005 MIU/L-ACNC: 0.31 UIU/ML (ref 0.34–5.6)
URN SPEC COLLECT METH UR: ABNORMAL
UROBILINOGEN UR STRIP-ACNC: NEGATIVE EU/DL
WBC # BLD AUTO: 4.65 K/UL (ref 3.9–12.7)
WBC #/AREA URNS HPF: 2 /HPF (ref 0–5)

## 2020-01-22 PROCEDURE — 81001 URINALYSIS AUTO W/SCOPE: CPT

## 2020-01-22 PROCEDURE — 36415 COLL VENOUS BLD VENIPUNCTURE: CPT

## 2020-01-22 PROCEDURE — 80061 LIPID PANEL: CPT

## 2020-01-22 PROCEDURE — 80053 COMPREHEN METABOLIC PANEL: CPT

## 2020-01-22 PROCEDURE — 84439 ASSAY OF FREE THYROXINE: CPT

## 2020-01-22 PROCEDURE — 84443 ASSAY THYROID STIM HORMONE: CPT

## 2020-01-22 PROCEDURE — 82043 UR ALBUMIN QUANTITATIVE: CPT

## 2020-01-22 PROCEDURE — 85025 COMPLETE CBC W/AUTO DIFF WBC: CPT

## 2020-01-28 ENCOUNTER — OFFICE VISIT (OUTPATIENT)
Dept: FAMILY MEDICINE | Facility: CLINIC | Age: 65
End: 2020-01-28
Payer: COMMERCIAL

## 2020-01-28 VITALS
DIASTOLIC BLOOD PRESSURE: 82 MMHG | BODY MASS INDEX: 24.19 KG/M2 | WEIGHT: 145.19 LBS | HEIGHT: 65 IN | SYSTOLIC BLOOD PRESSURE: 132 MMHG | HEART RATE: 68 BPM

## 2020-01-28 DIAGNOSIS — E55.9 VITAMIN D DEFICIENCY: ICD-10-CM

## 2020-01-28 DIAGNOSIS — J30.1 SEASONAL ALLERGIC RHINITIS DUE TO POLLEN: ICD-10-CM

## 2020-01-28 DIAGNOSIS — I10 HYPERTENSION, ESSENTIAL: ICD-10-CM

## 2020-01-28 DIAGNOSIS — E03.4 HYPOTHYROIDISM DUE TO ACQUIRED ATROPHY OF THYROID: Primary | ICD-10-CM

## 2020-01-28 DIAGNOSIS — F41.9 ANXIETY: ICD-10-CM

## 2020-01-28 DIAGNOSIS — G43.009 MIGRAINE WITHOUT AURA AND WITHOUT STATUS MIGRAINOSUS, NOT INTRACTABLE: ICD-10-CM

## 2020-01-28 DIAGNOSIS — F32.1 MODERATE MAJOR DEPRESSION: ICD-10-CM

## 2020-01-28 PROCEDURE — 99214 OFFICE O/P EST MOD 30 MIN: CPT | Mod: S$GLB,,, | Performed by: NURSE PRACTITIONER

## 2020-01-28 PROCEDURE — 3079F PR MOST RECENT DIASTOLIC BLOOD PRESSURE 80-89 MM HG: ICD-10-PCS | Mod: S$GLB,,, | Performed by: NURSE PRACTITIONER

## 2020-01-28 PROCEDURE — 3075F SYST BP GE 130 - 139MM HG: CPT | Mod: S$GLB,,, | Performed by: NURSE PRACTITIONER

## 2020-01-28 PROCEDURE — 3079F DIAST BP 80-89 MM HG: CPT | Mod: S$GLB,,, | Performed by: NURSE PRACTITIONER

## 2020-01-28 PROCEDURE — 3075F PR MOST RECENT SYSTOLIC BLOOD PRESS GE 130-139MM HG: ICD-10-PCS | Mod: S$GLB,,, | Performed by: NURSE PRACTITIONER

## 2020-01-28 PROCEDURE — 3008F PR BODY MASS INDEX (BMI) DOCUMENTED: ICD-10-PCS | Mod: S$GLB,,, | Performed by: NURSE PRACTITIONER

## 2020-01-28 PROCEDURE — 3008F BODY MASS INDEX DOCD: CPT | Mod: S$GLB,,, | Performed by: NURSE PRACTITIONER

## 2020-01-28 PROCEDURE — 99214 PR OFFICE/OUTPT VISIT, EST, LEVL IV, 30-39 MIN: ICD-10-PCS | Mod: S$GLB,,, | Performed by: NURSE PRACTITIONER

## 2020-01-28 RX ORDER — METOPROLOL TARTRATE 50 MG/1
50 TABLET ORAL DAILY
Qty: 90 TABLET | Refills: 1 | Status: SHIPPED | OUTPATIENT
Start: 2020-01-28 | End: 2020-07-06 | Stop reason: SDUPTHER

## 2020-01-28 RX ORDER — ESCITALOPRAM OXALATE 10 MG/1
10 TABLET ORAL DAILY
Qty: 90 TABLET | Refills: 1 | Status: SHIPPED | OUTPATIENT
Start: 2020-01-28 | End: 2020-07-06 | Stop reason: SDUPTHER

## 2020-01-28 RX ORDER — DICLOFENAC SODIUM 10 MG/G
GEL TOPICAL 2 TIMES DAILY PRN
Qty: 100 G | Refills: 0 | Status: SHIPPED | OUTPATIENT
Start: 2020-01-28

## 2020-01-28 RX ORDER — BUPROPION HYDROCHLORIDE 300 MG/1
300 TABLET ORAL DAILY
Qty: 90 TABLET | Refills: 1 | Status: SHIPPED | OUTPATIENT
Start: 2020-01-28 | End: 2020-07-06 | Stop reason: SDUPTHER

## 2020-01-28 RX ORDER — LEVOTHYROXINE SODIUM 125 UG/1
125 TABLET ORAL
COMMUNITY
Start: 2019-12-02 | End: 2020-12-21 | Stop reason: SDUPTHER

## 2020-01-28 RX ORDER — AMLODIPINE BESYLATE 5 MG/1
5 TABLET ORAL DAILY
Qty: 90 TABLET | Refills: 1 | Status: SHIPPED | OUTPATIENT
Start: 2020-01-28 | End: 2020-07-06 | Stop reason: SDUPTHER

## 2020-01-28 RX ORDER — BUTALBITAL, ASPIRIN, CAFFEINE AND CODEINE PHOSPHATE 50; 325; 40; 30 MG/1; MG/1; MG/1; MG/1
1 CAPSULE ORAL EVERY 12 HOURS PRN
Qty: 60 CAPSULE | Refills: 0 | Status: SHIPPED | OUTPATIENT
Start: 2020-01-28 | End: 2020-02-26 | Stop reason: SDUPTHER

## 2020-01-28 NOTE — PROGRESS NOTES
SUBJECTIVE:    Patient ID: Eulalia Bermudez is a 64 y.o. female.    Chief Complaint: Follow-up (6 month follow up.....mlr) and Medication Refill (Med bottles present and reconciled.....pharmacy verified.....mlr)    64 year old female presents for check up. Feeling fine. Needs refills on meds. Has been having some trouble with fatigue . Has been experiencing x several years. Sleeps ok most nights. Followed regularly by endocrine. Recently had labs done. Will fax copy.       Lab Visit on 01/22/2020   Component Date Value Ref Range Status    WBC 01/22/2020 4.65  3.90 - 12.70 K/uL Final    RBC 01/22/2020 3.93* 4.00 - 5.40 M/uL Final    Hemoglobin 01/22/2020 13.4  12.0 - 16.0 g/dL Final    Hematocrit 01/22/2020 41.4  37.0 - 48.5 % Final    Mean Corpuscular Volume 01/22/2020 105* 82 - 98 fL Final    Mean Corpuscular Hemoglobin 01/22/2020 34.1* 27.0 - 31.0 pg Final    Mean Corpuscular Hemoglobin Conc 01/22/2020 32.4  32.0 - 36.0 g/dL Final    RDW 01/22/2020 11.6  11.5 - 14.5 % Final    Platelets 01/22/2020 221  150 - 350 K/uL Final    MPV 01/22/2020 10.4  9.2 - 12.9 fL Final    Immature Granulocytes 01/22/2020 0.2  0.0 - 0.5 % Final    Gran # (ANC) 01/22/2020 2.5  1.8 - 7.7 K/uL Final    Immature Grans (Abs) 01/22/2020 0.01  0.00 - 0.04 K/uL Final    Lymph # 01/22/2020 1.6  1.0 - 4.8 K/uL Final    Mono # 01/22/2020 0.5  0.3 - 1.0 K/uL Final    Eos # 01/22/2020 0.1  0.0 - 0.5 K/uL Final    Baso # 01/22/2020 0.03  0.00 - 0.20 K/uL Final    nRBC 01/22/2020 0  0 /100 WBC Final    Gran% 01/22/2020 53.7  38.0 - 73.0 % Final    Lymph% 01/22/2020 33.5  18.0 - 48.0 % Final    Mono% 01/22/2020 10.1  4.0 - 15.0 % Final    Eosinophil% 01/22/2020 1.9  0.0 - 8.0 % Final    Basophil% 01/22/2020 0.6  0.0 - 1.9 % Final    Differential Method 01/22/2020 Automated   Final    Sodium 01/22/2020 140  136 - 145 mmol/L Final    Potassium 01/22/2020 4.1  3.5 - 5.1 mmol/L Final    Chloride 01/22/2020 105  95 - 110 mmol/L  Final    CO2 01/22/2020 27  23 - 29 mmol/L Final    Glucose 01/22/2020 97  70 - 110 mg/dL Final    BUN, Bld 01/22/2020 20  8 - 23 mg/dL Final    Creatinine 01/22/2020 0.8  0.5 - 1.4 mg/dL Final    Calcium 01/22/2020 8.8  8.7 - 10.5 mg/dL Final    Total Protein 01/22/2020 6.3  6.0 - 8.4 g/dL Final    Albumin 01/22/2020 3.6  3.5 - 5.2 g/dL Final    Total Bilirubin 01/22/2020 0.7  0.1 - 1.0 mg/dL Final    Alkaline Phosphatase 01/22/2020 67  55 - 135 U/L Final    AST 01/22/2020 32  10 - 40 U/L Final    ALT 01/22/2020 42  10 - 44 U/L Final    Anion Gap 01/22/2020 8  8 - 16 mmol/L Final    eGFR if African American 01/22/2020 >60.0  >60 mL/min/1.73 m^2 Final    eGFR if non African American 01/22/2020 >60.0  >60 mL/min/1.73 m^2 Final    Cholesterol 01/22/2020 236* 120 - 199 mg/dL Final    Triglycerides 01/22/2020 101  30 - 150 mg/dL Final    HDL 01/22/2020 78* 40 - 75 mg/dL Final    LDL Cholesterol 01/22/2020 137.8  63.0 - 159.0 mg/dL Final    Hdl/Cholesterol Ratio 01/22/2020 33.1  20.0 - 50.0 % Final    Total Cholesterol/HDL Ratio 01/22/2020 3.0  2.0 - 5.0 Final    Non-HDL Cholesterol 01/22/2020 158  mg/dL Final    TSH 01/22/2020 0.310* 0.340 - 5.600 uIU/mL Final    Specimen UA 01/22/2020 Urine, Clean Catch   Final    Color, UA 01/22/2020 Yellow  Yellow, Straw, Maci Final    Appearance, UA 01/22/2020 Clear  Clear Final    pH, UA 01/22/2020 6.0  5.0 - 8.0 Final    Specific Gravity, UA 01/22/2020 1.020  1.005 - 1.030 Final    Protein, UA 01/22/2020 Negative  Negative Final    Glucose, UA 01/22/2020 Negative  Negative Final    Ketones, UA 01/22/2020 Negative  Negative Final    Bilirubin (UA) 01/22/2020 Negative  Negative Final    Occult Blood UA 01/22/2020 1+* Negative Final    Nitrite, UA 01/22/2020 Negative  Negative Final    Urobilinogen, UA 01/22/2020 Negative  Negative EU/dL Final    Leukocytes, UA 01/22/2020 Trace* Negative Final    Microalbum.,U,Random 01/22/2020 8.6  ug/mL Final     Creatinine, Random Ur 2020 107.0  15.0 - 325.0 mg/dL Final    Microalb Creat Ratio 2020 8.0  0.0 - 30.0 ug/mg Final    Free T4 2020 1.18  0.71 - 1.51 ng/dL Final    RBC, UA 2020 6* 0 - 4 /hpf Final    WBC, UA 2020 2  0 - 5 /hpf Final    Bacteria 2020 Negative  None-Occ /hpf Final    Squam Epithel, UA 2020 1  /hpf Final    Hyaline Casts, UA 2020 6* 0-1/lpf /lpf Final    Microscopic Comment 2020 SEE COMMENT   Final   Lab Visit on 2019   Component Date Value Ref Range Status    TSH 2019 1.490  0.340 - 5.600 uIU/mL Final    Free T4 2019 1.01  0.71 - 1.51 ng/dL Final    Vit D, 25-Hydroxy 2019 43  30 - 96 ng/mL Final    Sodium 2019 137  136 - 145 mmol/L Final    Potassium 2019 4.3  3.5 - 5.1 mmol/L Final    Chloride 2019 103  95 - 110 mmol/L Final    CO2 2019 27  23 - 29 mmol/L Final    Glucose 2019 96  70 - 110 mg/dL Final    BUN, Bld 2019 22  8 - 23 mg/dL Final    Creatinine 2019 1.0  0.5 - 1.4 mg/dL Final    Calcium 2019 9.0  8.7 - 10.5 mg/dL Final    Anion Gap 2019 7* 8 - 16 mmol/L Final    eGFR if African American 2019 >60.0  >60 mL/min/1.73 m^2 Final    eGFR if non  2019 59.7* >60 mL/min/1.73 m^2 Final       Past Medical History:   Diagnosis Date    Depression     Hypertension     Hypothyroidism     Kidney stones      Past Surgical History:   Procedure Laterality Date     SECTION       Family History   Problem Relation Age of Onset    Cancer Mother     COPD Mother     Heart disease Mother     Hypertension Mother     Depression Mother     Hearing loss Mother     Emphysema Father     Breast cancer Paternal Aunt        Marital Status:   Alcohol History:  reports that she drinks alcohol.  Tobacco History:  reports that she has never smoked. She has never used smokeless tobacco.  Drug History:  reports that she  does not use drugs.    Review of patient's allergies indicates:   Allergen Reactions    Imitrex [sumatriptan] Anaphylaxis    Egg Nausea Only    Penicillins Rash    Sulfa (sulfonamide antibiotics) Rash       Current Outpatient Medications:     amLODIPine (NORVASC) 5 MG tablet, Take 1 tablet (5 mg total) by mouth once daily., Disp: 90 tablet, Rfl: 1    buPROPion (WELLBUTRIN XL) 300 MG 24 hr tablet, Take 1 tablet (300 mg total) by mouth once daily., Disp: 90 tablet, Rfl: 1    codeine-butalbital-ASA-caffeine (BUTALBITAL COMPOUND W/CODEINE) 82--40 mg Cap, Take 1 capsule by mouth every 12 (twelve) hours as needed., Disp: 60 capsule, Rfl: 0    diclofenac sodium (VOLTAREN) 1 % Gel, Apply topically 2 (two) times daily as needed., Disp: 100 g, Rfl: 0    escitalopram oxalate (LEXAPRO) 10 MG tablet, Take 1 tablet (10 mg total) by mouth once daily., Disp: 90 tablet, Rfl: 1    metoprolol tartrate (LOPRESSOR) 50 MG tablet, Take 1 tablet (50 mg total) by mouth once daily., Disp: 90 tablet, Rfl: 1    olopatadine (PATANOL) 0.1 % ophthalmic solution, Place 1 drop into both eyes 2 (two) times daily., Disp: 3 mL, Rfl: 6    SYNTHROID 125 mcg tablet, Take 125 mcg by mouth before breakfast., Disp: , Rfl:     triamcinolone (NASACORT) 55 mcg nasal inhaler, 2 sprays by Nasal route once daily., Disp: 1 g, Rfl: 6    Review of Systems   Constitutional: Positive for fatigue. Negative for chills, fever and unexpected weight change.   HENT: Negative for ear pain, rhinorrhea and sore throat.    Eyes: Negative for pain and visual disturbance.   Respiratory: Negative for cough and shortness of breath.    Cardiovascular: Negative for chest pain, palpitations and leg swelling.   Gastrointestinal: Negative for abdominal pain, diarrhea, nausea and vomiting.   Genitourinary: Negative for difficulty urinating, hematuria and vaginal bleeding.   Musculoskeletal: Negative for arthralgias.   Skin: Negative for rash.   Neurological: Positive  "for headaches. Negative for dizziness and weakness.   Psychiatric/Behavioral: Negative for agitation and sleep disturbance. The patient is not nervous/anxious.           Objective:      Vitals:    01/28/20 1113   BP: 132/82   Pulse: 68   Weight: 65.9 kg (145 lb 3.2 oz)   Height: 5' 5" (1.651 m)     Body mass index is 24.16 kg/m².  Physical Exam   Constitutional: She is oriented to person, place, and time. She appears well-developed and well-nourished.   HENT:   Right Ear: External ear normal.   Left Ear: External ear normal.   Mouth/Throat: Oropharynx is clear and moist.   Eyes: Pupils are equal, round, and reactive to light. Conjunctivae are normal.   Neck: Normal range of motion. Neck supple. No JVD present.   Cardiovascular: Normal rate and regular rhythm.   No murmur heard.  Pulmonary/Chest: Effort normal and breath sounds normal.   Abdominal: Soft. Bowel sounds are normal.   Musculoskeletal: Normal range of motion. She exhibits no edema or deformity.   Lymphadenopathy:     She has no cervical adenopathy.   Neurological: She is alert and oriented to person, place, and time. Gait normal.   Skin: Skin is warm, dry and intact. No rash noted.   Psychiatric: She has a normal mood and affect. Her speech is normal and behavior is normal.         Assessment:       1. Hypothyroidism due to acquired atrophy of thyroid    2. Hypertension, essential    3. Migraine without aura and without status migrainosus, not intractable    4. Moderate major depression    5. Vitamin D deficiency    6. Anxiety    7. Seasonal allergic rhinitis due to pollen         Plan:       Hypothyroidism due to acquired atrophy of thyroid    Hypertension, essential  -     amLODIPine (NORVASC) 5 MG tablet; Take 1 tablet (5 mg total) by mouth once daily.  Dispense: 90 tablet; Refill: 1  -     metoprolol tartrate (LOPRESSOR) 50 MG tablet; Take 1 tablet (50 mg total) by mouth once daily.  Dispense: 90 tablet; Refill: 1    Migraine without aura and without " status migrainosus, not intractable  -     codeine-butalbital-ASA-caffeine (BUTALBITAL COMPOUND W/CODEINE) 81--40 mg Cap; Take 1 capsule by mouth every 12 (twelve) hours as needed.  Dispense: 60 capsule; Refill: 0    Moderate major depression  -     buPROPion (WELLBUTRIN XL) 300 MG 24 hr tablet; Take 1 tablet (300 mg total) by mouth once daily.  Dispense: 90 tablet; Refill: 1  -     escitalopram oxalate (LEXAPRO) 10 MG tablet; Take 1 tablet (10 mg total) by mouth once daily.  Dispense: 90 tablet; Refill: 1    Vitamin D deficiency    Anxiety    Seasonal allergic rhinitis due to pollen    Other orders  -     diclofenac sodium (VOLTAREN) 1 % Gel; Apply topically 2 (two) times daily as needed.  Dispense: 100 g; Refill: 0      Follow up in about 6 months (around 7/28/2020) for medication management.

## 2020-01-30 PROBLEM — F41.9 ANXIETY: Status: ACTIVE | Noted: 2019-04-15

## 2020-01-30 PROBLEM — J30.2 SEASONAL ALLERGIC RHINITIS: Status: ACTIVE | Noted: 2019-04-15

## 2020-02-26 DIAGNOSIS — G43.009 MIGRAINE WITHOUT AURA AND WITHOUT STATUS MIGRAINOSUS, NOT INTRACTABLE: ICD-10-CM

## 2020-02-26 RX ORDER — BUTALBITAL, ASPIRIN, CAFFEINE AND CODEINE PHOSPHATE 50; 325; 40; 30 MG/1; MG/1; MG/1; MG/1
1 CAPSULE ORAL EVERY 12 HOURS PRN
Qty: 60 CAPSULE | Refills: 0 | Status: SHIPPED | OUTPATIENT
Start: 2020-02-26 | End: 2020-03-23 | Stop reason: SDUPTHER

## 2020-02-26 NOTE — TELEPHONE ENCOUNTER
----- Message from Chelita Gao sent at 2/26/2020 12:51 PM CST -----  vm- patient needs a refill of codeine-butalbital-ASA-caffeine  [phone]

## 2020-03-11 ENCOUNTER — OFFICE VISIT (OUTPATIENT)
Dept: FAMILY MEDICINE | Facility: CLINIC | Age: 65
End: 2020-03-11
Payer: COMMERCIAL

## 2020-03-11 ENCOUNTER — TELEPHONE (OUTPATIENT)
Dept: FAMILY MEDICINE | Facility: CLINIC | Age: 65
End: 2020-03-11

## 2020-03-11 VITALS
WEIGHT: 148.38 LBS | HEART RATE: 76 BPM | HEIGHT: 65 IN | DIASTOLIC BLOOD PRESSURE: 78 MMHG | BODY MASS INDEX: 24.72 KG/M2 | SYSTOLIC BLOOD PRESSURE: 102 MMHG

## 2020-03-11 DIAGNOSIS — M54.2 NECK PAIN: ICD-10-CM

## 2020-03-11 DIAGNOSIS — F41.9 ANXIETY: ICD-10-CM

## 2020-03-11 DIAGNOSIS — M62.838 MUSCLE SPASM: Primary | ICD-10-CM

## 2020-03-11 DIAGNOSIS — I10 HYPERTENSION, ESSENTIAL: ICD-10-CM

## 2020-03-11 PROCEDURE — 96372 THER/PROPH/DIAG INJ SC/IM: CPT | Mod: S$GLB,,, | Performed by: NURSE PRACTITIONER

## 2020-03-11 PROCEDURE — 3074F SYST BP LT 130 MM HG: CPT | Mod: S$GLB,,, | Performed by: NURSE PRACTITIONER

## 2020-03-11 PROCEDURE — 3078F DIAST BP <80 MM HG: CPT | Mod: S$GLB,,, | Performed by: NURSE PRACTITIONER

## 2020-03-11 PROCEDURE — 3008F BODY MASS INDEX DOCD: CPT | Mod: S$GLB,,, | Performed by: NURSE PRACTITIONER

## 2020-03-11 PROCEDURE — 99213 PR OFFICE/OUTPT VISIT, EST, LEVL III, 20-29 MIN: ICD-10-PCS | Mod: 25,S$GLB,, | Performed by: NURSE PRACTITIONER

## 2020-03-11 PROCEDURE — 3078F PR MOST RECENT DIASTOLIC BLOOD PRESSURE < 80 MM HG: ICD-10-PCS | Mod: S$GLB,,, | Performed by: NURSE PRACTITIONER

## 2020-03-11 PROCEDURE — 3008F PR BODY MASS INDEX (BMI) DOCUMENTED: ICD-10-PCS | Mod: S$GLB,,, | Performed by: NURSE PRACTITIONER

## 2020-03-11 PROCEDURE — 96372 PR INJECTION,THERAP/PROPH/DIAG2ST, IM OR SUBCUT: ICD-10-PCS | Mod: S$GLB,,, | Performed by: NURSE PRACTITIONER

## 2020-03-11 PROCEDURE — 3074F PR MOST RECENT SYSTOLIC BLOOD PRESSURE < 130 MM HG: ICD-10-PCS | Mod: S$GLB,,, | Performed by: NURSE PRACTITIONER

## 2020-03-11 PROCEDURE — 99213 OFFICE O/P EST LOW 20 MIN: CPT | Mod: 25,S$GLB,, | Performed by: NURSE PRACTITIONER

## 2020-03-11 RX ORDER — TIZANIDINE 4 MG/1
4 TABLET ORAL EVERY 8 HOURS
Qty: 30 TABLET | Refills: 0 | Status: SHIPPED | OUTPATIENT
Start: 2020-03-11 | End: 2020-03-21

## 2020-03-11 RX ORDER — KETOROLAC TROMETHAMINE 30 MG/ML
30 INJECTION, SOLUTION INTRAMUSCULAR; INTRAVENOUS
Status: COMPLETED | OUTPATIENT
Start: 2020-03-11 | End: 2020-03-11

## 2020-03-11 RX ORDER — METHYLPREDNISOLONE 4 MG/1
TABLET ORAL
Qty: 1 PACKAGE | Refills: 0 | Status: SHIPPED | OUTPATIENT
Start: 2020-03-11 | End: 2020-04-01

## 2020-03-11 RX ADMIN — KETOROLAC TROMETHAMINE 30 MG: 30 INJECTION, SOLUTION INTRAMUSCULAR; INTRAVENOUS at 12:03

## 2020-03-11 NOTE — TELEPHONE ENCOUNTER
----- Message from Marla Sheehan sent at 3/11/2020  9:39 AM CDT -----  Contact: Karen TEJADA 8:27  She pulled a muscle in her neck . She wants to come in today. pts # [phone]

## 2020-03-12 DIAGNOSIS — M54.2 NECK PAIN: ICD-10-CM

## 2020-03-12 DIAGNOSIS — R93.89 ABNORMAL X-RAY: Primary | ICD-10-CM

## 2020-03-12 DIAGNOSIS — M54.12 CERVICAL RADICULOPATHY: ICD-10-CM

## 2020-03-12 NOTE — TELEPHONE ENCOUNTER
----- Message from Marla Sheehan sent at 3/12/2020  3:54 PM CDT -----  Contact: Karen TEJADA  The patient saw talisha yesterday. Her neck is not any better. What should she do? pts # [phone]

## 2020-03-12 NOTE — TELEPHONE ENCOUNTER
Pt called in and stated she is not feeling any better since her visit on Wednesday.  She was wondering if there is anything else she can do?

## 2020-03-13 ENCOUNTER — HOSPITAL ENCOUNTER (OUTPATIENT)
Dept: RADIOLOGY | Facility: HOSPITAL | Age: 65
Discharge: HOME | End: 2020-03-13
Attending: NURSE PRACTITIONER
Payer: COMMERCIAL

## 2020-03-13 ENCOUNTER — TELEPHONE (OUTPATIENT)
Dept: FAMILY MEDICINE | Facility: CLINIC | Age: 65
End: 2020-03-13

## 2020-03-13 DIAGNOSIS — M54.2 NECK PAIN: Primary | ICD-10-CM

## 2020-03-13 DIAGNOSIS — M54.2 NECK PAIN: ICD-10-CM

## 2020-03-13 PROCEDURE — 72040 X-RAY EXAM NECK SPINE 2-3 VW: CPT | Mod: TC,PO

## 2020-03-13 NOTE — TELEPHONE ENCOUNTER
----- Message from Michael Salinas sent at 3/13/2020 10:14 AM CDT -----  Pre prev message:   The patient saw talisha yesterday. Her neck is not any better. What should she do? pts # [phone]

## 2020-03-13 NOTE — TELEPHONE ENCOUNTER
Spoke to pt. Pain is still as bad as it was on Wednesday. Taking meds as prescribed. Per Lizy, order c spine X Ray. Advised pt and she is going to go to imaging center today. Order entered to be signed.

## 2020-03-16 RX ORDER — HYDROCODONE BITARTRATE AND ACETAMINOPHEN 5; 325 MG/1; MG/1
1 TABLET ORAL EVERY 6 HOURS PRN
Qty: 30 TABLET | Refills: 0 | Status: SHIPPED | OUTPATIENT
Start: 2020-03-16 | End: 2020-09-21

## 2020-03-16 NOTE — TELEPHONE ENCOUNTER
Please call and see how she is. Multilevel degenerative disc disease. Mild posterior disc slippage. Needs additional views on xray.

## 2020-03-16 NOTE — TELEPHONE ENCOUNTER
Spoke to pt. Says she's pretty much been in the bed since Wednesday. Today is first day she's really gotten up. Still having some pain. Advised that may need to do a further x ray. She says she could possibly go do it today or tomorrow morning. But wants your opinion on it all.

## 2020-03-16 NOTE — PROGRESS NOTES
SUBJECTIVE:    Patient ID: Eulalia Bermudez is a 64 y.o. female.    Chief Complaint: Neck Pain (neck stiffness and pain x 3 days.....Brought med bottles.....mlr)    64 year old female presents with complaints of neck pain. Symptoms started 3 days ago. Denies injury or trauma. Does not radiate. Has tried taking otc meds with no improvement. Pain is constant. Does worsen with movement. No previous neck injury.       Lab Visit on 01/22/2020   Component Date Value Ref Range Status    WBC 01/22/2020 4.65  3.90 - 12.70 K/uL Final    RBC 01/22/2020 3.93* 4.00 - 5.40 M/uL Final    Hemoglobin 01/22/2020 13.4  12.0 - 16.0 g/dL Final    Hematocrit 01/22/2020 41.4  37.0 - 48.5 % Final    Mean Corpuscular Volume 01/22/2020 105* 82 - 98 fL Final    Mean Corpuscular Hemoglobin 01/22/2020 34.1* 27.0 - 31.0 pg Final    Mean Corpuscular Hemoglobin Conc 01/22/2020 32.4  32.0 - 36.0 g/dL Final    RDW 01/22/2020 11.6  11.5 - 14.5 % Final    Platelets 01/22/2020 221  150 - 350 K/uL Final    MPV 01/22/2020 10.4  9.2 - 12.9 fL Final    Immature Granulocytes 01/22/2020 0.2  0.0 - 0.5 % Final    Gran # (ANC) 01/22/2020 2.5  1.8 - 7.7 K/uL Final    Immature Grans (Abs) 01/22/2020 0.01  0.00 - 0.04 K/uL Final    Lymph # 01/22/2020 1.6  1.0 - 4.8 K/uL Final    Mono # 01/22/2020 0.5  0.3 - 1.0 K/uL Final    Eos # 01/22/2020 0.1  0.0 - 0.5 K/uL Final    Baso # 01/22/2020 0.03  0.00 - 0.20 K/uL Final    nRBC 01/22/2020 0  0 /100 WBC Final    Gran% 01/22/2020 53.7  38.0 - 73.0 % Final    Lymph% 01/22/2020 33.5  18.0 - 48.0 % Final    Mono% 01/22/2020 10.1  4.0 - 15.0 % Final    Eosinophil% 01/22/2020 1.9  0.0 - 8.0 % Final    Basophil% 01/22/2020 0.6  0.0 - 1.9 % Final    Differential Method 01/22/2020 Automated   Final    Sodium 01/22/2020 140  136 - 145 mmol/L Final    Potassium 01/22/2020 4.1  3.5 - 5.1 mmol/L Final    Chloride 01/22/2020 105  95 - 110 mmol/L Final    CO2 01/22/2020 27  23 - 29 mmol/L Final    Glucose  01/22/2020 97  70 - 110 mg/dL Final    BUN, Bld 01/22/2020 20  8 - 23 mg/dL Final    Creatinine 01/22/2020 0.8  0.5 - 1.4 mg/dL Final    Calcium 01/22/2020 8.8  8.7 - 10.5 mg/dL Final    Total Protein 01/22/2020 6.3  6.0 - 8.4 g/dL Final    Albumin 01/22/2020 3.6  3.5 - 5.2 g/dL Final    Total Bilirubin 01/22/2020 0.7  0.1 - 1.0 mg/dL Final    Alkaline Phosphatase 01/22/2020 67  55 - 135 U/L Final    AST 01/22/2020 32  10 - 40 U/L Final    ALT 01/22/2020 42  10 - 44 U/L Final    Anion Gap 01/22/2020 8  8 - 16 mmol/L Final    eGFR if African American 01/22/2020 >60.0  >60 mL/min/1.73 m^2 Final    eGFR if non African American 01/22/2020 >60.0  >60 mL/min/1.73 m^2 Final    Cholesterol 01/22/2020 236* 120 - 199 mg/dL Final    Triglycerides 01/22/2020 101  30 - 150 mg/dL Final    HDL 01/22/2020 78* 40 - 75 mg/dL Final    LDL Cholesterol 01/22/2020 137.8  63.0 - 159.0 mg/dL Final    Hdl/Cholesterol Ratio 01/22/2020 33.1  20.0 - 50.0 % Final    Total Cholesterol/HDL Ratio 01/22/2020 3.0  2.0 - 5.0 Final    Non-HDL Cholesterol 01/22/2020 158  mg/dL Final    TSH 01/22/2020 0.310* 0.340 - 5.600 uIU/mL Final    Specimen UA 01/22/2020 Urine, Clean Catch   Final    Color, UA 01/22/2020 Yellow  Yellow, Straw, Maci Final    Appearance, UA 01/22/2020 Clear  Clear Final    pH, UA 01/22/2020 6.0  5.0 - 8.0 Final    Specific Gravity, UA 01/22/2020 1.020  1.005 - 1.030 Final    Protein, UA 01/22/2020 Negative  Negative Final    Glucose, UA 01/22/2020 Negative  Negative Final    Ketones, UA 01/22/2020 Negative  Negative Final    Bilirubin (UA) 01/22/2020 Negative  Negative Final    Occult Blood UA 01/22/2020 1+* Negative Final    Nitrite, UA 01/22/2020 Negative  Negative Final    Urobilinogen, UA 01/22/2020 Negative  Negative EU/dL Final    Leukocytes, UA 01/22/2020 Trace* Negative Final    Microalbum.,U,Random 01/22/2020 8.6  ug/mL Final    Creatinine, Random Ur 01/22/2020 107.0  15.0 - 325.0 mg/dL  Final    Microalb Creat Ratio 2020 8.0  0.0 - 30.0 ug/mg Final    Free T4 2020 1.18  0.71 - 1.51 ng/dL Final    RBC, UA 2020 6* 0 - 4 /hpf Final    WBC, UA 2020 2  0 - 5 /hpf Final    Bacteria 2020 Negative  None-Occ /hpf Final    Squam Epithel, UA 2020 1  /hpf Final    Hyaline Casts, UA 2020 6* 0-1/lpf /lpf Final    Microscopic Comment 2020 SEE COMMENT   Final       Past Medical History:   Diagnosis Date    Depression     Hypertension     Hypothyroidism     Kidney stones      Past Surgical History:   Procedure Laterality Date     SECTION       Family History   Problem Relation Age of Onset    Cancer Mother     COPD Mother     Heart disease Mother     Hypertension Mother     Depression Mother     Hearing loss Mother     Emphysema Father     Breast cancer Paternal Aunt        Marital Status:   Alcohol History:  reports that she drinks alcohol.  Tobacco History:  reports that she has never smoked. She has never used smokeless tobacco.  Drug History:  reports that she does not use drugs.    Review of patient's allergies indicates:   Allergen Reactions    Imitrex [sumatriptan] Anaphylaxis    Egg Nausea Only    Penicillins Rash    Sulfa (sulfonamide antibiotics) Rash       Current Outpatient Medications:     amLODIPine (NORVASC) 5 MG tablet, Take 1 tablet (5 mg total) by mouth once daily., Disp: 90 tablet, Rfl: 1    buPROPion (WELLBUTRIN XL) 300 MG 24 hr tablet, Take 1 tablet (300 mg total) by mouth once daily., Disp: 90 tablet, Rfl: 1    codeine-butalbital-ASA-caffeine (BUTALBITAL COMPOUND W/CODEINE) 53--40 mg Cap, Take 1 capsule by mouth every 12 (twelve) hours as needed., Disp: 60 capsule, Rfl: 0    diclofenac sodium (VOLTAREN) 1 % Gel, Apply topically 2 (two) times daily as needed., Disp: 100 g, Rfl: 0    escitalopram oxalate (LEXAPRO) 10 MG tablet, Take 1 tablet (10 mg total) by mouth once daily., Disp: 90 tablet, Rfl:  "1    metoprolol tartrate (LOPRESSOR) 50 MG tablet, Take 1 tablet (50 mg total) by mouth once daily., Disp: 90 tablet, Rfl: 1    olopatadine (PATANOL) 0.1 % ophthalmic solution, Place 1 drop into both eyes 2 (two) times daily., Disp: 3 mL, Rfl: 6    SYNTHROID 125 mcg tablet, Take 125 mcg by mouth before breakfast., Disp: , Rfl:     methylPREDNISolone (MEDROL DOSEPACK) 4 mg tablet, use as directed, Disp: 1 Package, Rfl: 0    tiZANidine (ZANAFLEX) 4 MG tablet, Take 1 tablet (4 mg total) by mouth every 8 (eight) hours. for 10 days, Disp: 30 tablet, Rfl: 0    triamcinolone (NASACORT) 55 mcg nasal inhaler, 2 sprays by Nasal route once daily. (Patient not taking: Reported on 3/11/2020), Disp: 1 g, Rfl: 6    Review of Systems   Constitutional: Negative for activity change, chills and fever.   Respiratory: Negative for cough and shortness of breath.    Cardiovascular: Negative for chest pain.   Gastrointestinal: Negative for abdominal pain, diarrhea, nausea and vomiting.   Genitourinary: Negative for difficulty urinating and flank pain.   Musculoskeletal: Positive for neck pain. Negative for back pain and gait problem.   Neurological: Negative for dizziness and weakness.          Objective:      Vitals:    03/11/20 1207   BP: 102/78   Pulse: 76   Weight: 67.3 kg (148 lb 6.4 oz)   Height: 5' 5" (1.651 m)     Body mass index is 24.7 kg/m².  Physical Exam   Constitutional: She is oriented to person, place, and time. She appears well-developed and well-nourished. No distress.   Cardiovascular: Normal rate and regular rhythm. Exam reveals no gallop and no friction rub.   No murmur heard.  Pulmonary/Chest: Breath sounds normal. She has no wheezes. She has no rales.   Musculoskeletal:        Cervical back: She exhibits decreased range of motion and tenderness.        Lumbar back: She exhibits normal range of motion, no tenderness, no bony tenderness, no pain and no spasm.        Back:    Negative bilat SLR   Neurological: She " is alert and oriented to person, place, and time. She has normal strength. No sensory deficit. Gait normal.   Skin: No rash noted.         Assessment:       1. Muscle spasm    2. Neck pain    3. Hypertension, essential    4. Anxiety         Plan:       Muscle spasm  -     tiZANidine (ZANAFLEX) 4 MG tablet; Take 1 tablet (4 mg total) by mouth every 8 (eight) hours. for 10 days  Dispense: 30 tablet; Refill: 0    Neck pain  Comments:  heat. rest. call if symptoms worsen or do not improve  Orders:  -     ketorolac injection 30 mg  -     methylPREDNISolone (MEDROL DOSEPACK) 4 mg tablet; use as directed  Dispense: 1 Package; Refill: 0    Hypertension, essential    Anxiety      Follow up if symptoms worsen or fail to improve.

## 2020-03-16 NOTE — TELEPHONE ENCOUNTER
Lets do mri of cspine. ?? No relief at all? Can you set up [place] for DR. Brady to send in? #[phone]

## 2020-03-23 DIAGNOSIS — G43.009 MIGRAINE WITHOUT AURA AND WITHOUT STATUS MIGRAINOSUS, NOT INTRACTABLE: ICD-10-CM

## 2020-03-23 RX ORDER — BUTALBITAL, ASPIRIN, CAFFEINE AND CODEINE PHOSPHATE 50; 325; 40; 30 MG/1; MG/1; MG/1; MG/1
1 CAPSULE ORAL EVERY 12 HOURS PRN
Qty: 60 CAPSULE | Refills: 0 | Status: SHIPPED | OUTPATIENT
Start: 2020-03-23 | End: 2020-04-23 | Stop reason: SDUPTHER

## 2020-03-23 NOTE — TELEPHONE ENCOUNTER
----- Message from Michaela Mason sent at 3/23/2020 10:59 AM CDT -----  VM @ 10:53 refill on codeine-butalbital-ASA-caffeine  # [phone]

## 2020-03-24 ENCOUNTER — TELEPHONE (OUTPATIENT)
Dept: FAMILY MEDICINE | Facility: CLINIC | Age: 65
End: 2020-03-24

## 2020-03-24 NOTE — TELEPHONE ENCOUNTER
Spoke with pt to see how her neck was feeling per Lizy. States she is doing much better. She is almost back to normal.

## 2020-04-22 ENCOUNTER — TELEPHONE (OUTPATIENT)
Dept: FAMILY MEDICINE | Facility: CLINIC | Age: 65
End: 2020-04-22

## 2020-04-22 DIAGNOSIS — G43.009 MIGRAINE WITHOUT AURA AND WITHOUT STATUS MIGRAINOSUS, NOT INTRACTABLE: ICD-10-CM

## 2020-04-22 RX ORDER — BUTALBITAL, ASPIRIN, CAFFEINE AND CODEINE PHOSPHATE 50; 325; 40; 30 MG/1; MG/1; MG/1; MG/1
1 CAPSULE ORAL EVERY 12 HOURS PRN
Qty: 60 CAPSULE | Refills: 0 | OUTPATIENT
Start: 2020-04-22 | End: 2020-05-22

## 2020-04-22 NOTE — TELEPHONE ENCOUNTER
----- Message from Lore Troy MA sent at 4/22/2020 12:03 PM CDT -----  Pt called in requesting refill:    codeine-butalbital-ASA-caffeine     [facility]    Pt - [phone]

## 2020-04-22 NOTE — TELEPHONE ENCOUNTER
Spoke to pt. She says the specialist gave her a rx for norco with no refills for her continued neck pain. We called it in a month ago with no refills Needing her migraine med called in.

## 2020-04-23 RX ORDER — BUTALBITAL, ASPIRIN, CAFFEINE AND CODEINE PHOSPHATE 50; 325; 40; 30 MG/1; MG/1; MG/1; MG/1
1 CAPSULE ORAL EVERY 12 HOURS PRN
Qty: 60 CAPSULE | Refills: 0 | Status: SHIPPED | OUTPATIENT
Start: 2020-04-23 | End: 2020-05-21 | Stop reason: SDUPTHER

## 2020-04-23 NOTE — TELEPHONE ENCOUNTER
----- Message from Michaela Mason sent at 4/23/2020  1:16 PM CDT -----  VM @ 1:08 calling back in regards to having a refill of Fiorinal sent in.  No pharmacy info left.    # [phone]

## 2020-05-21 DIAGNOSIS — G43.009 MIGRAINE WITHOUT AURA AND WITHOUT STATUS MIGRAINOSUS, NOT INTRACTABLE: ICD-10-CM

## 2020-05-21 RX ORDER — BUTALBITAL, ASPIRIN, CAFFEINE AND CODEINE PHOSPHATE 50; 325; 40; 30 MG/1; MG/1; MG/1; MG/1
1 CAPSULE ORAL EVERY 12 HOURS PRN
Qty: 60 CAPSULE | Refills: 0 | Status: SHIPPED | OUTPATIENT
Start: 2020-05-23 | End: 2020-06-22 | Stop reason: SDUPTHER

## 2020-05-21 NOTE — TELEPHONE ENCOUNTER
----- Message from Jimena Luis sent at 5/21/2020  3:19 PM CDT -----  Contact: ERIKA  3:17  NEEDS REFILL ON BUTALBITAL FOR MIRAGRITALIA  [facility] .

## 2020-06-22 DIAGNOSIS — G43.009 MIGRAINE WITHOUT AURA AND WITHOUT STATUS MIGRAINOSUS, NOT INTRACTABLE: ICD-10-CM

## 2020-06-22 RX ORDER — BUTALBITAL, ASPIRIN, CAFFEINE AND CODEINE PHOSPHATE 50; 325; 40; 30 MG/1; MG/1; MG/1; MG/1
1 CAPSULE ORAL EVERY 12 HOURS PRN
Qty: 60 CAPSULE | Refills: 0 | Status: SHIPPED | OUTPATIENT
Start: 2020-06-22 | End: 2020-07-22 | Stop reason: SDUPTHER

## 2020-06-22 NOTE — TELEPHONE ENCOUNTER
----- Message from Monik Jaramillo sent at 6/22/2020 11:47 AM CDT -----  - pt needs refill on [facility]  [phone]

## 2020-07-06 DIAGNOSIS — F32.1 MODERATE MAJOR DEPRESSION: ICD-10-CM

## 2020-07-06 DIAGNOSIS — I10 HYPERTENSION, ESSENTIAL: ICD-10-CM

## 2020-07-06 RX ORDER — AMLODIPINE BESYLATE 5 MG/1
5 TABLET ORAL DAILY
Qty: 90 TABLET | Refills: 1 | Status: SHIPPED | OUTPATIENT
Start: 2020-07-06 | End: 2020-09-21 | Stop reason: SDUPTHER

## 2020-07-06 RX ORDER — BUPROPION HYDROCHLORIDE 300 MG/1
300 TABLET ORAL DAILY
Qty: 90 TABLET | Refills: 1 | Status: SHIPPED | OUTPATIENT
Start: 2020-07-06 | End: 2020-09-21 | Stop reason: SDUPTHER

## 2020-07-06 RX ORDER — ESCITALOPRAM OXALATE 10 MG/1
10 TABLET ORAL DAILY
Qty: 90 TABLET | Refills: 1 | Status: SHIPPED | OUTPATIENT
Start: 2020-07-06 | End: 2020-09-21 | Stop reason: SDUPTHER

## 2020-07-06 RX ORDER — METOPROLOL TARTRATE 50 MG/1
50 TABLET ORAL DAILY
Qty: 90 TABLET | Refills: 1 | Status: SHIPPED | OUTPATIENT
Start: 2020-07-06 | End: 2020-09-21 | Stop reason: SDUPTHER

## 2020-07-06 NOTE — TELEPHONE ENCOUNTER
----- Message from Chelita Gao sent at 7/6/2020 12:26 PM CDT -----  Vm- patient is calling about her rx that still havent been refilled yet @ 10:20

## 2020-07-22 DIAGNOSIS — G43.009 MIGRAINE WITHOUT AURA AND WITHOUT STATUS MIGRAINOSUS, NOT INTRACTABLE: ICD-10-CM

## 2020-07-22 NOTE — TELEPHONE ENCOUNTER
----- Message from Marla Sheehan sent at 7/22/2020 10:48 AM CDT -----   10:47    Refill Butalbital  [facility] on [phone] GH

## 2020-07-23 ENCOUNTER — TELEPHONE (OUTPATIENT)
Dept: FAMILY MEDICINE | Facility: CLINIC | Age: 65
End: 2020-07-23

## 2020-07-23 RX ORDER — BUTALBITAL, ASPIRIN, CAFFEINE AND CODEINE PHOSPHATE 50; 325; 40; 30 MG/1; MG/1; MG/1; MG/1
1 CAPSULE ORAL EVERY 12 HOURS PRN
Qty: 60 CAPSULE | Refills: 0 | Status: SHIPPED | OUTPATIENT
Start: 2020-07-23 | End: 2020-08-21 | Stop reason: SDUPTHER

## 2020-07-23 NOTE — TELEPHONE ENCOUNTER
Spoke to pt regarding her appt on 7/29. Offered virtual pt stated she wants in office appt. c-19 reviewed. Pt stated she has new insurance medicare, she hasn't received a new card just yet. Pt is waiting on a letter with the new insurance info . Pt stated if she doesn't receive the letter before her appt date she will give us a call to cancel her appt to a later date

## 2020-08-19 ENCOUNTER — LAB VISIT (OUTPATIENT)
Dept: LAB | Facility: HOSPITAL | Age: 65
End: 2020-08-19
Attending: INTERNAL MEDICINE
Payer: MEDICARE

## 2020-08-19 DIAGNOSIS — E06.5 HYPOTHYROIDISM DUE TO FIBROUS INVASIVE THYROIDITIS: Primary | ICD-10-CM

## 2020-08-19 DIAGNOSIS — E55.9 AVITAMINOSIS D: ICD-10-CM

## 2020-08-19 DIAGNOSIS — E03.8 HYPOTHYROIDISM DUE TO FIBROUS INVASIVE THYROIDITIS: Primary | ICD-10-CM

## 2020-08-19 DIAGNOSIS — E78.00 PURE HYPERCHOLESTEROLEMIA: ICD-10-CM

## 2020-08-19 DIAGNOSIS — M89.9 BONE DISEASE: ICD-10-CM

## 2020-08-19 LAB
25(OH)D3+25(OH)D2 SERPL-MCNC: 45 NG/ML (ref 30–96)
ANION GAP SERPL CALC-SCNC: 7 MMOL/L (ref 8–16)
BUN SERPL-MCNC: 23 MG/DL (ref 8–23)
CALCIUM SERPL-MCNC: 9.2 MG/DL (ref 8.7–10.5)
CHLORIDE SERPL-SCNC: 108 MMOL/L (ref 95–110)
CO2 SERPL-SCNC: 28 MMOL/L (ref 23–29)
CREAT SERPL-MCNC: 0.9 MG/DL (ref 0.5–1.4)
EST. GFR  (AFRICAN AMERICAN): >60 ML/MIN/1.73 M^2
EST. GFR  (NON AFRICAN AMERICAN): >60 ML/MIN/1.73 M^2
GLUCOSE SERPL-MCNC: 80 MG/DL (ref 70–110)
POTASSIUM SERPL-SCNC: 4.5 MMOL/L (ref 3.5–5.1)
SODIUM SERPL-SCNC: 143 MMOL/L (ref 136–145)
TSH SERPL DL<=0.005 MIU/L-ACNC: 0.42 UIU/ML (ref 0.34–5.6)

## 2020-08-19 PROCEDURE — 80048 BASIC METABOLIC PNL TOTAL CA: CPT

## 2020-08-19 PROCEDURE — 84443 ASSAY THYROID STIM HORMONE: CPT

## 2020-08-19 PROCEDURE — 82306 VITAMIN D 25 HYDROXY: CPT

## 2020-08-19 PROCEDURE — 36415 COLL VENOUS BLD VENIPUNCTURE: CPT

## 2020-08-21 DIAGNOSIS — G43.009 MIGRAINE WITHOUT AURA AND WITHOUT STATUS MIGRAINOSUS, NOT INTRACTABLE: ICD-10-CM

## 2020-08-21 RX ORDER — BUTALBITAL, ASPIRIN, CAFFEINE AND CODEINE PHOSPHATE 50; 325; 40; 30 MG/1; MG/1; MG/1; MG/1
1 CAPSULE ORAL EVERY 12 HOURS PRN
Qty: 60 CAPSULE | Refills: 0 | Status: SHIPPED | OUTPATIENT
Start: 2020-08-22 | End: 2020-09-21 | Stop reason: SDUPTHER
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
## 2020-09-21 ENCOUNTER — OFFICE VISIT (OUTPATIENT)
Dept: FAMILY MEDICINE | Facility: CLINIC | Age: 65
End: 2020-09-21
Payer: MEDICARE

## 2020-09-21 VITALS
SYSTOLIC BLOOD PRESSURE: 118 MMHG | BODY MASS INDEX: 24.49 KG/M2 | TEMPERATURE: 98 F | DIASTOLIC BLOOD PRESSURE: 78 MMHG | WEIGHT: 147 LBS | HEIGHT: 65 IN | HEART RATE: 76 BPM

## 2020-09-21 DIAGNOSIS — I10 HYPERTENSION, ESSENTIAL: ICD-10-CM

## 2020-09-21 DIAGNOSIS — Z23 NEED FOR INFLUENZA VACCINATION: ICD-10-CM

## 2020-09-21 DIAGNOSIS — F32.1 MODERATE MAJOR DEPRESSION: ICD-10-CM

## 2020-09-21 DIAGNOSIS — G43.009 MIGRAINE WITHOUT AURA AND WITHOUT STATUS MIGRAINOSUS, NOT INTRACTABLE: ICD-10-CM

## 2020-09-21 DIAGNOSIS — Z12.39 SCREENING FOR BREAST CANCER: ICD-10-CM

## 2020-09-21 DIAGNOSIS — Z23 NEED FOR VACCINATION WITH 13-POLYVALENT PNEUMOCOCCAL CONJUGATE VACCINE: Primary | ICD-10-CM

## 2020-09-21 PROCEDURE — 90670 PCV13 VACCINE IM: CPT | Mod: S$GLB,,, | Performed by: NURSE PRACTITIONER

## 2020-09-21 PROCEDURE — 90682 FLU VACCINE - QUADRIVALENT (RECOMBINANT) PRESERVATIVE FREE: ICD-10-PCS | Mod: S$GLB,,, | Performed by: NURSE PRACTITIONER

## 2020-09-21 PROCEDURE — G0009 ADMIN PNEUMOCOCCAL VACCINE: HCPCS | Mod: S$GLB,,, | Performed by: NURSE PRACTITIONER

## 2020-09-21 PROCEDURE — 99214 OFFICE O/P EST MOD 30 MIN: CPT | Mod: 25,S$GLB,, | Performed by: NURSE PRACTITIONER

## 2020-09-21 PROCEDURE — 99214 PR OFFICE/OUTPT VISIT, EST, LEVL IV, 30-39 MIN: ICD-10-PCS | Mod: 25,S$GLB,, | Performed by: NURSE PRACTITIONER

## 2020-09-21 PROCEDURE — G0008 PNEUMOCOCCAL CONJUGATE VACCINE 13-VALENT LESS THAN 5YO & GREATER THAN: ICD-10-PCS | Mod: S$GLB,,, | Performed by: NURSE PRACTITIONER

## 2020-09-21 PROCEDURE — 90670 PNEUMOCOCCAL CONJUGATE VACCINE 13-VALENT LESS THAN 5YO & GREATER THAN: ICD-10-PCS | Mod: S$GLB,,, | Performed by: NURSE PRACTITIONER

## 2020-09-21 PROCEDURE — 90682 RIV4 VACC RECOMBINANT DNA IM: CPT | Mod: S$GLB,,, | Performed by: NURSE PRACTITIONER

## 2020-09-21 PROCEDURE — G0008 ADMIN INFLUENZA VIRUS VAC: HCPCS | Mod: S$GLB,,, | Performed by: NURSE PRACTITIONER

## 2020-09-21 PROCEDURE — G0009 FLU VACCINE - QUADRIVALENT (RECOMBINANT) PRESERVATIVE FREE: ICD-10-PCS | Mod: S$GLB,,, | Performed by: NURSE PRACTITIONER

## 2020-09-21 RX ORDER — CETIRIZINE HYDROCHLORIDE 10 MG/1
10 TABLET ORAL DAILY
COMMUNITY

## 2020-09-21 RX ORDER — BUTALBITAL, ASPIRIN, CAFFEINE AND CODEINE PHOSPHATE 50; 325; 40; 30 MG/1; MG/1; MG/1; MG/1
1 CAPSULE ORAL EVERY 12 HOURS PRN
Qty: 60 CAPSULE | Refills: 0 | Status: CANCELLED | OUTPATIENT
Start: 2020-09-21 | End: 2020-10-21

## 2020-09-21 RX ORDER — BUTALBITAL, ASPIRIN, CAFFEINE AND CODEINE PHOSPHATE 50; 325; 40; 30 MG/1; MG/1; MG/1; MG/1
1 CAPSULE ORAL EVERY 12 HOURS PRN
Qty: 60 CAPSULE | Refills: 0 | Status: SHIPPED | OUTPATIENT
Start: 2020-10-21 | End: 2020-11-20

## 2020-09-21 RX ORDER — FOLIC ACID 0.4 MG
400 TABLET ORAL DAILY
COMMUNITY

## 2020-09-21 RX ORDER — ESCITALOPRAM OXALATE 10 MG/1
20 TABLET ORAL DAILY
Qty: 90 TABLET | Refills: 1 | Status: SHIPPED | OUTPATIENT
Start: 2020-09-21 | End: 2020-09-21

## 2020-09-21 RX ORDER — BUPROPION HYDROCHLORIDE 300 MG/1
300 TABLET ORAL DAILY
Qty: 90 TABLET | Refills: 1 | Status: SHIPPED | OUTPATIENT
Start: 2020-09-21 | End: 2020-12-21 | Stop reason: SDUPTHER

## 2020-09-21 RX ORDER — ESCITALOPRAM OXALATE 20 MG/1
20 TABLET ORAL DAILY
Qty: 90 TABLET | Refills: 1 | Status: SHIPPED | OUTPATIENT
Start: 2020-09-21 | End: 2020-12-21 | Stop reason: SDUPTHER

## 2020-09-21 RX ORDER — BUTALBITAL, ASPIRIN, CAFFEINE AND CODEINE PHOSPHATE 50; 325; 40; 30 MG/1; MG/1; MG/1; MG/1
1 CAPSULE ORAL EVERY 12 HOURS PRN
Qty: 60 CAPSULE | Refills: 0 | Status: SHIPPED | OUTPATIENT
Start: 2020-09-21 | End: 2020-10-21

## 2020-09-21 RX ORDER — BUTALBITAL, ASPIRIN, CAFFEINE AND CODEINE PHOSPHATE 50; 325; 40; 30 MG/1; MG/1; MG/1; MG/1
1 CAPSULE ORAL EVERY 12 HOURS PRN
Qty: 60 CAPSULE | Refills: 0 | Status: SHIPPED | OUTPATIENT
Start: 2020-11-20 | End: 2020-11-20

## 2020-09-21 RX ORDER — METOPROLOL TARTRATE 50 MG/1
50 TABLET ORAL DAILY
Qty: 90 TABLET | Refills: 1 | Status: SHIPPED | OUTPATIENT
Start: 2020-09-21 | End: 2020-12-21 | Stop reason: SDUPTHER

## 2020-09-21 RX ORDER — AMLODIPINE BESYLATE 5 MG/1
5 TABLET ORAL DAILY
Qty: 90 TABLET | Refills: 1 | Status: SHIPPED | OUTPATIENT
Start: 2020-09-21 | End: 2020-12-21 | Stop reason: SDUPTHER

## 2020-09-21 NOTE — TELEPHONE ENCOUNTER
----- Message from Bella Bernard sent at 9/21/2020  4:07 PM CDT -----  Regarding: Refill  Contact: shaista Bermudez  Needs refill on Butalbital w/ codeine and aspirin.   Pt is at the pharmacy right now   Send to [facility]  Pt# [phone]

## 2020-09-21 NOTE — PROGRESS NOTES
SUBJECTIVE:    Patient ID: Eulalia Bermudez is a 65 y.o. female.    Chief Complaint: Follow-up (6mth, brought bottles, PNA 13 wants, FLU shot allergic to eggs, DEXA req Dr. Spencer// SUDHAKAR)    65year old female presents for check up. Feeling fine. Does admit to increased stress. Taking meds as prescribes. Still worries a lot.  Needs refills on meds. Has been having some trouble with fatigue . Has been experiencing x several years. Sleeps ok most nights. Followed regularly by endocrine. Recently had labs done. Will fax copy.       Lab Visit on 08/19/2020   Component Date Value Ref Range Status    Sodium 08/19/2020 143  136 - 145 mmol/L Final    Potassium 08/19/2020 4.5  3.5 - 5.1 mmol/L Final    Chloride 08/19/2020 108  95 - 110 mmol/L Final    CO2 08/19/2020 28  23 - 29 mmol/L Final    Glucose 08/19/2020 80  70 - 110 mg/dL Final    BUN, Bld 08/19/2020 23  8 - 23 mg/dL Final    Creatinine 08/19/2020 0.9  0.5 - 1.4 mg/dL Final    Calcium 08/19/2020 9.2  8.7 - 10.5 mg/dL Final    Anion Gap 08/19/2020 7* 8 - 16 mmol/L Final    eGFR if African American 08/19/2020 >60.0  >60 mL/min/1.73 m^2 Final    eGFR if non African American 08/19/2020 >60.0  >60 mL/min/1.73 m^2 Final    TSH 08/19/2020 0.420  0.340 - 5.600 uIU/mL Final    Vit D, 25-Hydroxy 08/19/2020 45  30 - 96 ng/mL Final       Past Medical History:   Diagnosis Date    Depression     Hypertension     Hypothyroidism     Kidney stones      Social History     Socioeconomic History    Marital status:      Spouse name: Not on file    Number of children: Not on file    Years of education: Not on file    Highest education level: Not on file   Occupational History    Not on file   Social Needs    Financial resource strain: Not on file    Food insecurity     Worry: Not on file     Inability: Not on file    Transportation needs     Medical: Not on file     Non-medical: Not on file   Tobacco Use    Smoking status: Never Smoker    Smokeless tobacco:  Never Used   Substance and Sexual Activity    Alcohol use: Yes    Drug use: No    Sexual activity: Yes     Partners: Male   Lifestyle    Physical activity     Days per week: Not on file     Minutes per session: Not on file    Stress: Not at all   Relationships    Social connections     Talks on phone: Not on file     Gets together: Not on file     Attends Orthodox service: Not on file     Active member of club or organization: Not on file     Attends meetings of clubs or organizations: Not on file     Relationship status: Not on file   Other Topics Concern    Not on file   Social History Narrative    Not on file     Past Surgical History:   Procedure Laterality Date     SECTION       Family History   Problem Relation Age of Onset    Cancer Mother     COPD Mother     Heart disease Mother     Hypertension Mother     Depression Mother     Hearing loss Mother     Emphysema Father     Breast cancer Paternal Aunt        Review of patient's allergies indicates:   Allergen Reactions    Imitrex [sumatriptan] Anaphylaxis    Egg Nausea Only    Penicillins Rash    Sulfa (sulfonamide antibiotics) Rash       Current Outpatient Medications:     amLODIPine (NORVASC) 5 MG tablet, Take 1 tablet (5 mg total) by mouth once daily., Disp: 90 tablet, Rfl: 1    buPROPion (WELLBUTRIN XL) 300 MG 24 hr tablet, Take 1 tablet (300 mg total) by mouth once daily., Disp: 90 tablet, Rfl: 1    cetirizine (ZYRTEC) 10 MG tablet, Take 10 mg by mouth once daily., Disp: , Rfl:     diclofenac sodium (VOLTAREN) 1 % Gel, Apply topically 2 (two) times daily as needed., Disp: 100 g, Rfl: 0    escitalopram oxalate (LEXAPRO) 20 MG tablet, Take 1 tablet (20 mg total) by mouth once daily., Disp: 90 tablet, Rfl: 1    folic acid (FOLVITE) 400 MCG tablet, Take 400 mcg by mouth once daily., Disp: , Rfl:     metoprolol tartrate (LOPRESSOR) 50 MG tablet, Take 1 tablet (50 mg total) by mouth once daily., Disp: 90 tablet, Rfl: 1     "SYNTHROID 125 mcg tablet, Take 125 mcg by mouth before breakfast., Disp: , Rfl:     triamcinolone (NASACORT) 55 mcg nasal inhaler, 2 sprays by Nasal route once daily., Disp: 1 g, Rfl: 6    [START ON 10/21/2020] codeine-butalbital-ASA-caffeine (BUTALBITAL COMPOUND W/CODEINE) 36--40 mg Cap, Take 1 capsule by mouth every 12 (twelve) hours as needed., Disp: 60 capsule, Rfl: 0    [START ON 11/20/2020] codeine-butalbital-ASA-caffeine (BUTALBITAL COMPOUND W/CODEINE) 55--40 mg Cap, Take 1 capsule by mouth every 12 (twelve) hours as needed., Disp: 60 capsule, Rfl: 0    codeine-butalbital-ASA-caffeine (BUTALBITAL COMPOUND W/CODEINE) 43--40 mg Cap, Take 1 capsule by mouth every 12 (twelve) hours as needed., Disp: 60 capsule, Rfl: 0    Review of Systems   Constitutional: Negative for chills, fever and unexpected weight change.   HENT: Negative for ear pain, rhinorrhea and sore throat.    Eyes: Negative for pain and visual disturbance.   Respiratory: Negative for cough and shortness of breath.    Cardiovascular: Negative for chest pain, palpitations and leg swelling.   Gastrointestinal: Negative for abdominal pain, diarrhea, nausea and vomiting.   Genitourinary: Negative for difficulty urinating, hematuria and vaginal bleeding.   Musculoskeletal: Negative for arthralgias.   Skin: Negative for rash.   Neurological: Positive for headaches. Negative for dizziness and weakness.   Psychiatric/Behavioral: Negative for agitation and sleep disturbance. The patient is not nervous/anxious.           Objective:      Vitals:    09/21/20 1153   BP: 118/78   Pulse: 76   Temp: 98 °F (36.7 °C)   Weight: 66.7 kg (147 lb)   Height: 5' 5" (1.651 m)     Physical Exam  Constitutional:       Appearance: She is well-developed.   HENT:      Right Ear: External ear normal.      Left Ear: External ear normal.   Eyes:      Conjunctiva/sclera: Conjunctivae normal.      Pupils: Pupils are equal, round, and reactive to light.   Neck:    "   Musculoskeletal: Normal range of motion and neck supple.      Vascular: No JVD.   Cardiovascular:      Rate and Rhythm: Normal rate and regular rhythm.      Heart sounds: No murmur.   Pulmonary:      Effort: Pulmonary effort is normal.      Breath sounds: Normal breath sounds.   Abdominal:      General: Bowel sounds are normal.      Palpations: Abdomen is soft.   Musculoskeletal: Normal range of motion.         General: No deformity.   Lymphadenopathy:      Cervical: No cervical adenopathy.   Skin:     General: Skin is warm and dry.      Findings: No rash.   Neurological:      Mental Status: She is alert and oriented to person, place, and time.      Gait: Gait normal.   Psychiatric:         Speech: Speech normal.         Behavior: Behavior normal.           Assessment:       1. Need for vaccination with 13-polyvalent pneumococcal conjugate vaccine    2. Hypertension, essential    3. Moderate major depression    4. Migraine without aura and without status migrainosus, not intractable    5. Need for influenza vaccination    6. Screening for breast cancer         Plan:       Need for vaccination with 13-polyvalent pneumococcal conjugate vaccine  -     Pneumococcal Conjugate Vaccine (13 Valent) (IM)    Hypertension, essential  -     amLODIPine (NORVASC) 5 MG tablet; Take 1 tablet (5 mg total) by mouth once daily.  Dispense: 90 tablet; Refill: 1  -     metoprolol tartrate (LOPRESSOR) 50 MG tablet; Take 1 tablet (50 mg total) by mouth once daily.  Dispense: 90 tablet; Refill: 1    Moderate major depression  Comments:  increase lexapro to 20  Orders:  -     buPROPion (WELLBUTRIN XL) 300 MG 24 hr tablet; Take 1 tablet (300 mg total) by mouth once daily.  Dispense: 90 tablet; Refill: 1  -     Discontinue: escitalopram oxalate (LEXAPRO) 10 MG tablet; Take 2 tablets (20 mg total) by mouth once daily.  Dispense: 90 tablet; Refill: 1  -     escitalopram oxalate (LEXAPRO) 20 MG tablet; Take 1 tablet (20 mg total) by mouth once  daily.  Dispense: 90 tablet; Refill: 1    Migraine without aura and without status migrainosus, not intractable    Need for influenza vaccination  -     Influenza - Quadrivalent (Recombinant) (PF)    Screening for breast cancer  -     Mammo Digital Diagnostic Bilat; Future; Expected date: 09/21/2020      Follow up in about 3 months (around 12/21/2020).        9/26/2020 Lizy Noble

## 2020-09-23 ENCOUNTER — TELEPHONE (OUTPATIENT)
Dept: FAMILY MEDICINE | Facility: CLINIC | Age: 65
End: 2020-09-23

## 2020-09-23 DIAGNOSIS — Z12.31 SCREENING MAMMOGRAM FOR HIGH-RISK PATIENT: ICD-10-CM

## 2020-09-23 DIAGNOSIS — Z78.0 MENOPAUSE: ICD-10-CM

## 2020-09-23 DIAGNOSIS — Z12.39 SCREENING FOR BREAST CANCER: Primary | ICD-10-CM

## 2020-09-23 NOTE — TELEPHONE ENCOUNTER
----- Message from Monik Garcia sent at 9/23/2020  2:18 PM CDT -----  Regarding: Order clairification  Can I please get clarification on the diag mammo order? The code put in is for a screening  but the order was send as a diag mammo. If it is for a diag mammo please also send ultrasound of breast limited left or right with dx code being very specific. If you have any questions let me know. Thanks, Ana

## 2020-09-30 ENCOUNTER — TELEPHONE (OUTPATIENT)
Dept: FAMILY MEDICINE | Facility: CLINIC | Age: 65
End: 2020-09-30

## 2020-09-30 NOTE — TELEPHONE ENCOUNTER
The mammogram should be a screening mammo, Lizy put an order for a screening in there. The diagnostic one can be canceled.

## 2020-09-30 NOTE — TELEPHONE ENCOUNTER
----- Message from Monik Garcia sent at 9/30/2020  3:28 PM CDT -----  Regarding: FW: Order clairification  Thank you for the clarification. Unfortunately the code on the screening mammo is not payable. Can you please correct and resend. We usually use Z12.31 for screening. Thanks!    ----- Message -----  From: Monik Garcia  Sent: 9/30/2020  10:18 AM CDT  To: Lizy Noble Staff  Subject: FW: Order clairification                         Just wanted to touch base on you regarding the carnification order?      ----- Message -----  From: Monik Garcia  Sent: 9/23/2020   2:18 PM CDT  To: Lizy Noble Staff  Subject: Order clairification                             Can I please get clarification on the diag mammo order? The code put in is for a screening  but the order was send as a diag mammo. If it is for a diag mammo please also send ultrasound of breast limited left or right with dx code being very specific. If you have any questions let me know. Thanks, Ana

## 2020-09-30 NOTE — TELEPHONE ENCOUNTER
----- Message from Monik Garcia sent at 9/30/2020 10:18 AM CDT -----  Regarding: FW: Order clairification  Just wanted to touch base on you regarding the carnification order?      ----- Message -----  From: Monik Garcia  Sent: 9/23/2020   2:18 PM CDT  To: Lizy Noble Staff  Subject: Order clairification                             Can I please get clarification on the diag mammo order? The code put in is for a screening  but the order was send as a diag mammo. If it is for a diag mammo please also send ultrasound of breast limited left or right with dx code being very specific. If you have any questions let me know. Thanks, Ana

## 2020-10-16 ENCOUNTER — HOSPITAL ENCOUNTER (OUTPATIENT)
Dept: RADIOLOGY | Facility: HOSPITAL | Age: 65
Discharge: HOME | End: 2020-10-16
Attending: NURSE PRACTITIONER
Payer: MEDICARE

## 2020-10-16 VITALS — BODY MASS INDEX: 24.5 KG/M2 | HEIGHT: 65 IN | WEIGHT: 147.06 LBS

## 2020-10-16 DIAGNOSIS — Z78.0 MENOPAUSE: ICD-10-CM

## 2020-10-16 DIAGNOSIS — Z12.31 SCREENING MAMMOGRAM FOR HIGH-RISK PATIENT: ICD-10-CM

## 2020-10-16 PROCEDURE — 77067 SCR MAMMO BI INCL CAD: CPT | Mod: TC,PO

## 2020-10-19 ENCOUNTER — TELEPHONE (OUTPATIENT)
Dept: FAMILY MEDICINE | Facility: CLINIC | Age: 65
End: 2020-10-19

## 2020-10-19 NOTE — PROGRESS NOTES
----- Message from Lizy Noble NP sent at 10/19/2020  8:37 AM CDT -----  Normal mammogram--portal message sent/bennie

## 2020-10-21 ENCOUNTER — TELEPHONE (OUTPATIENT)
Dept: FAMILY MEDICINE | Facility: CLINIC | Age: 65
End: 2020-10-21

## 2020-10-21 NOTE — TELEPHONE ENCOUNTER
Spoke to pt regarding message below. Let pt know med should already be at the pharmacy. Pt verbalized understanding

## 2020-10-21 NOTE — TELEPHONE ENCOUNTER
----- Message from Marla Sheehan sent at 10/21/2020  9:01 AM CDT -----   9:45    Refill Butalbital [facility] on [facility]. Pt's #   [phone] GH

## 2020-11-20 DIAGNOSIS — G43.009 MIGRAINE WITHOUT AURA AND WITHOUT STATUS MIGRAINOSUS, NOT INTRACTABLE: ICD-10-CM

## 2020-11-20 NOTE — TELEPHONE ENCOUNTER
----- Message from Monik Jaramillo sent at 11/20/2020 10:56 AM CST -----  Vm- pt needs refill on butalbital  [phone]

## 2020-11-23 RX ORDER — BUTALBITAL, ASPIRIN, CAFFEINE AND CODEINE PHOSPHATE 50; 325; 40; 30 MG/1; MG/1; MG/1; MG/1
1 CAPSULE ORAL EVERY 12 HOURS PRN
Qty: 60 CAPSULE | Refills: 0 | Status: SHIPPED | OUTPATIENT
Start: 2020-11-23 | End: 2020-12-21 | Stop reason: SDUPTHER

## 2020-12-18 ENCOUNTER — LAB VISIT (OUTPATIENT)
Dept: LAB | Facility: HOSPITAL | Age: 65
End: 2020-12-18
Attending: INTERNAL MEDICINE
Payer: MEDICARE

## 2020-12-18 DIAGNOSIS — E03.8 HYPOTHYROIDISM DUE TO FIBROUS INVASIVE THYROIDITIS: Primary | ICD-10-CM

## 2020-12-18 DIAGNOSIS — E06.5 HYPOTHYROIDISM DUE TO FIBROUS INVASIVE THYROIDITIS: Primary | ICD-10-CM

## 2020-12-18 LAB — TSH SERPL DL<=0.005 MIU/L-ACNC: 1.79 UIU/ML (ref 0.34–5.6)

## 2020-12-18 PROCEDURE — 36415 COLL VENOUS BLD VENIPUNCTURE: CPT

## 2020-12-18 PROCEDURE — 84443 ASSAY THYROID STIM HORMONE: CPT

## 2020-12-21 ENCOUNTER — OFFICE VISIT (OUTPATIENT)
Dept: FAMILY MEDICINE | Facility: CLINIC | Age: 65
End: 2020-12-21
Payer: MEDICARE

## 2020-12-21 VITALS
HEIGHT: 65 IN | HEART RATE: 74 BPM | BODY MASS INDEX: 24.32 KG/M2 | SYSTOLIC BLOOD PRESSURE: 126 MMHG | WEIGHT: 146 LBS | DIASTOLIC BLOOD PRESSURE: 82 MMHG

## 2020-12-21 DIAGNOSIS — F32.1 MODERATE MAJOR DEPRESSION: ICD-10-CM

## 2020-12-21 DIAGNOSIS — G43.009 MIGRAINE WITHOUT AURA AND WITHOUT STATUS MIGRAINOSUS, NOT INTRACTABLE: ICD-10-CM

## 2020-12-21 DIAGNOSIS — G43.109 MIGRAINE WITH AURA AND WITHOUT STATUS MIGRAINOSUS, NOT INTRACTABLE: ICD-10-CM

## 2020-12-21 DIAGNOSIS — Z00.00 PHYSICAL EXAM: ICD-10-CM

## 2020-12-21 DIAGNOSIS — E03.4 HYPOTHYROIDISM DUE TO ACQUIRED ATROPHY OF THYROID: ICD-10-CM

## 2020-12-21 DIAGNOSIS — Z78.0 MENOPAUSE: Primary | ICD-10-CM

## 2020-12-21 DIAGNOSIS — I10 HYPERTENSION, ESSENTIAL: ICD-10-CM

## 2020-12-21 DIAGNOSIS — Z79.899 HIGH RISK MEDICATION USE: ICD-10-CM

## 2020-12-21 PROCEDURE — 99214 PR OFFICE/OUTPT VISIT, EST, LEVL IV, 30-39 MIN: ICD-10-PCS | Mod: S$GLB,,, | Performed by: NURSE PRACTITIONER

## 2020-12-21 PROCEDURE — 99214 OFFICE O/P EST MOD 30 MIN: CPT | Mod: S$GLB,,, | Performed by: NURSE PRACTITIONER

## 2020-12-21 RX ORDER — BUTALBITAL, ASPIRIN, CAFFEINE AND CODEINE PHOSPHATE 50; 325; 40; 30 MG/1; MG/1; MG/1; MG/1
1 CAPSULE ORAL EVERY 12 HOURS PRN
Qty: 60 CAPSULE | Refills: 0 | Status: SHIPPED | OUTPATIENT
Start: 2020-12-22 | End: 2021-01-21 | Stop reason: SDUPTHER

## 2020-12-21 RX ORDER — METOPROLOL TARTRATE 50 MG/1
50 TABLET ORAL DAILY
Qty: 90 TABLET | Refills: 1 | Status: SHIPPED | OUTPATIENT
Start: 2020-12-21 | End: 2021-03-22 | Stop reason: SDUPTHER

## 2020-12-21 RX ORDER — AMLODIPINE BESYLATE 5 MG/1
5 TABLET ORAL DAILY
Qty: 90 TABLET | Refills: 1 | Status: SHIPPED | OUTPATIENT
Start: 2020-12-21 | End: 2021-03-22 | Stop reason: SDUPTHER

## 2020-12-21 RX ORDER — LEVOTHYROXINE SODIUM 125 UG/1
125 TABLET ORAL
Qty: 90 TABLET | Refills: 1 | Status: SHIPPED | OUTPATIENT
Start: 2020-12-21 | End: 2021-09-22

## 2020-12-21 RX ORDER — BUPROPION HYDROCHLORIDE 300 MG/1
300 TABLET ORAL DAILY
Qty: 90 TABLET | Refills: 1 | Status: SHIPPED | OUTPATIENT
Start: 2020-12-21 | End: 2021-03-22 | Stop reason: SDUPTHER

## 2020-12-21 RX ORDER — TRIAMCINOLONE ACETONIDE 55 UG/1
2 SPRAY, METERED NASAL DAILY
Qty: 1 G | Refills: 6 | Status: SHIPPED | OUTPATIENT
Start: 2020-12-21

## 2020-12-21 RX ORDER — ESCITALOPRAM OXALATE 20 MG/1
20 TABLET ORAL DAILY
Qty: 90 TABLET | Refills: 1 | Status: SHIPPED | OUTPATIENT
Start: 2020-12-21 | End: 2021-03-22 | Stop reason: SDUPTHER

## 2020-12-21 RX ORDER — CHOLECALCIFEROL (VITAMIN D3) 25 MCG
1000 TABLET ORAL DAILY
COMMUNITY

## 2020-12-21 NOTE — PROGRESS NOTES
"  SUBJECTIVE:    Patient ID: Eulalia Bermudez is a 65 y.o. female.    Chief Complaint: Follow-up (3 month//brought bottles//dexa scan order tb )    65year old female presents for check up. Feeling fine. Does admit to increased stress. Taking meds as prescribes. Still worries a lot.taking both wellbutrin and lexapro. Does not want to change at this time. Wants to get through the "holidays and see if improves'  Needs refills on meds. Has been having some trouble with fatigue . Has been experiencing x several years. Sleeps ok most nights. Followed regularly by DR scrhader. Recently had tsh done which was normal. Does not exercise. Has normal appetite. No change in headaches. Takes fioricet  with relief      Lab Visit on 12/18/2020   Component Date Value Ref Range Status    TSH 12/18/2020 1.790  0.340 - 5.600 uIU/mL Final   Lab Visit on 08/19/2020   Component Date Value Ref Range Status    Sodium 08/19/2020 143  136 - 145 mmol/L Final    Potassium 08/19/2020 4.5  3.5 - 5.1 mmol/L Final    Chloride 08/19/2020 108  95 - 110 mmol/L Final    CO2 08/19/2020 28  23 - 29 mmol/L Final    Glucose 08/19/2020 80  70 - 110 mg/dL Final    BUN 08/19/2020 23  8 - 23 mg/dL Final    Creatinine 08/19/2020 0.9  0.5 - 1.4 mg/dL Final    Calcium 08/19/2020 9.2  8.7 - 10.5 mg/dL Final    Anion Gap 08/19/2020 7* 8 - 16 mmol/L Final    eGFR if African American 08/19/2020 >60.0  >60 mL/min/1.73 m^2 Final    eGFR if non African American 08/19/2020 >60.0  >60 mL/min/1.73 m^2 Final    TSH 08/19/2020 0.420  0.340 - 5.600 uIU/mL Final    Vit D, 25-Hydroxy 08/19/2020 45  30 - 96 ng/mL Final       Past Medical History:   Diagnosis Date    Depression     Hypertension     Hypothyroidism     Kidney stones      Social History     Socioeconomic History    Marital status:      Spouse name: Not on file    Number of children: Not on file    Years of education: Not on file    Highest education level: Not on file   Occupational History "    Not on file   Social Needs    Financial resource strain: Not on file    Food insecurity     Worry: Not on file     Inability: Not on file    Transportation needs     Medical: Not on file     Non-medical: Not on file   Tobacco Use    Smoking status: Never Smoker    Smokeless tobacco: Never Used   Substance and Sexual Activity    Alcohol use: Yes    Drug use: No    Sexual activity: Yes     Partners: Male   Lifestyle    Physical activity     Days per week: Not on file     Minutes per session: Not on file    Stress: Not at all   Relationships    Social connections     Talks on phone: Not on file     Gets together: Not on file     Attends Yazidi service: Not on file     Active member of club or organization: Not on file     Attends meetings of clubs or organizations: Not on file     Relationship status: Not on file   Other Topics Concern    Not on file   Social History Narrative    Not on file     Past Surgical History:   Procedure Laterality Date     SECTION       Family History   Problem Relation Age of Onset    Cancer Mother     COPD Mother     Heart disease Mother     Hypertension Mother     Depression Mother     Hearing loss Mother     Emphysema Father     Breast cancer Paternal Aunt        Review of patient's allergies indicates:   Allergen Reactions    Imitrex [sumatriptan] Anaphylaxis    Egg Nausea Only    Penicillins Rash    Sulfa (sulfonamide antibiotics) Rash       Current Outpatient Medications:     amLODIPine (NORVASC) 5 MG tablet, Take 1 tablet (5 mg total) by mouth once daily., Disp: 90 tablet, Rfl: 1    buPROPion (WELLBUTRIN XL) 300 MG 24 hr tablet, Take 1 tablet (300 mg total) by mouth once daily., Disp: 90 tablet, Rfl: 1    cetirizine (ZYRTEC) 10 MG tablet, Take 10 mg by mouth once daily., Disp: , Rfl:     codeine-butalbital-ASA-caffeine (BUTALBITAL COMPOUND W/CODEINE) 39--40 mg Cap, Take 1 capsule by mouth every 12 (twelve) hours as needed., Disp: 60  "capsule, Rfl: 0    escitalopram oxalate (LEXAPRO) 20 MG tablet, Take 1 tablet (20 mg total) by mouth once daily., Disp: 90 tablet, Rfl: 1    folic acid (FOLVITE) 400 MCG tablet, Take 400 mcg by mouth once daily., Disp: , Rfl:     metoprolol tartrate (LOPRESSOR) 50 MG tablet, Take 1 tablet (50 mg total) by mouth once daily., Disp: 90 tablet, Rfl: 1    SYNTHROID 125 mcg tablet, Take 1 tablet (125 mcg total) by mouth before breakfast., Disp: 90 tablet, Rfl: 1    triamcinolone (NASACORT) 55 mcg nasal inhaler, 2 sprays by Nasal route once daily., Disp: 1 g, Rfl: 6    vitamin D (VITAMIN D3) 1000 units Tab, Take 1,000 Units by mouth once daily., Disp: , Rfl:     diclofenac sodium (VOLTAREN) 1 % Gel, Apply topically 2 (two) times daily as needed., Disp: 100 g, Rfl: 0    Review of Systems   Constitutional: Negative for chills, fever and unexpected weight change.   HENT: Negative for ear pain, rhinorrhea and sore throat.    Eyes: Negative for pain and visual disturbance.   Respiratory: Negative for cough and shortness of breath.    Cardiovascular: Negative for chest pain, palpitations and leg swelling.   Gastrointestinal: Negative for abdominal pain, diarrhea, nausea and vomiting.   Genitourinary: Negative for difficulty urinating, hematuria and vaginal bleeding.   Musculoskeletal: Negative for arthralgias.   Skin: Negative for rash.   Neurological: Positive for headaches. Negative for dizziness and weakness.   Psychiatric/Behavioral: Negative for agitation and sleep disturbance.          Objective:      Vitals:    12/21/20 1119   BP: 126/82   Pulse: 74   Weight: 66.2 kg (146 lb)   Height: 5' 5" (1.651 m)     Physical Exam  Constitutional:       Appearance: She is well-developed.   HENT:      Right Ear: External ear normal.      Left Ear: External ear normal.   Eyes:      Conjunctiva/sclera: Conjunctivae normal.      Pupils: Pupils are equal, round, and reactive to light.   Neck:      Musculoskeletal: Normal range of " motion and neck supple.      Vascular: No JVD.   Cardiovascular:      Rate and Rhythm: Normal rate and regular rhythm.      Heart sounds: No murmur.   Pulmonary:      Effort: Pulmonary effort is normal.      Breath sounds: Normal breath sounds.   Abdominal:      General: Bowel sounds are normal.      Palpations: Abdomen is soft.   Musculoskeletal: Normal range of motion.         General: No deformity.   Lymphadenopathy:      Cervical: No cervical adenopathy.   Skin:     General: Skin is warm and dry.      Findings: No rash.   Neurological:      Mental Status: She is alert and oriented to person, place, and time.      Gait: Gait normal.   Psychiatric:         Speech: Speech normal.         Behavior: Behavior normal.           Assessment:       1. Menopause    2. Hypertension, essential    3. Moderate major depression    4. Hypothyroidism due to acquired atrophy of thyroid    5. Physical exam    6. High risk medication use    7. Migraine with aura and without status migrainosus, not intractable    8. Migraine without aura and without status migrainosus, not intractable         Plan:       Menopause  -     DXA Bone Density Spine And Hip; Future; Expected date: 12/21/2020    Hypertension, essential  -     amLODIPine (NORVASC) 5 MG tablet; Take 1 tablet (5 mg total) by mouth once daily.  Dispense: 90 tablet; Refill: 1  -     metoprolol tartrate (LOPRESSOR) 50 MG tablet; Take 1 tablet (50 mg total) by mouth once daily.  Dispense: 90 tablet; Refill: 1    Moderate major depression  Comments:  increase lexapro to 20  Orders:  -     buPROPion (WELLBUTRIN XL) 300 MG 24 hr tablet; Take 1 tablet (300 mg total) by mouth once daily.  Dispense: 90 tablet; Refill: 1  -     escitalopram oxalate (LEXAPRO) 20 MG tablet; Take 1 tablet (20 mg total) by mouth once daily.  Dispense: 90 tablet; Refill: 1    Hypothyroidism due to acquired atrophy of thyroid  -     SYNTHROID 125 mcg tablet; Take 1 tablet (125 mcg total) by mouth before  breakfast.  Dispense: 90 tablet; Refill: 1    Physical exam    High risk medication use    Migraine with aura and without status migrainosus, not intractable    Migraine without aura and without status migrainosus, not intractable  -     codeine-butalbital-ASA-caffeine (BUTALBITAL COMPOUND W/CODEINE) 43--40 mg Cap; Take 1 capsule by mouth every 12 (twelve) hours as needed.  Dispense: 60 capsule; Refill: 0    Other orders  -     triamcinolone (NASACORT) 55 mcg nasal inhaler; 2 sprays by Nasal route once daily.  Dispense: 1 g; Refill: 6      Follow up in about 3 months (around 3/21/2021) for medication management.        12/23/2020 Lizy Noble

## 2020-12-22 ENCOUNTER — TELEPHONE (OUTPATIENT)
Dept: FAMILY MEDICINE | Facility: CLINIC | Age: 65
End: 2020-12-22

## 2020-12-22 NOTE — TELEPHONE ENCOUNTER
Spoke to pt regarding message below. Pt stated DR. Pendleton normally fills that medication for her that she will give her office a call

## 2020-12-22 NOTE — TELEPHONE ENCOUNTER
----- Message from Michaela Mason sent at 12/22/2020  8:50 AM CST -----  [facility] requiring a PA.  Please advise

## 2021-01-06 ENCOUNTER — TELEPHONE (OUTPATIENT)
Dept: FAMILY MEDICINE | Facility: CLINIC | Age: 66
End: 2021-01-06

## 2021-01-21 ENCOUNTER — TELEPHONE (OUTPATIENT)
Dept: FAMILY MEDICINE | Facility: CLINIC | Age: 66
End: 2021-01-21

## 2021-01-21 DIAGNOSIS — G43.009 MIGRAINE WITHOUT AURA AND WITHOUT STATUS MIGRAINOSUS, NOT INTRACTABLE: ICD-10-CM

## 2021-01-21 RX ORDER — BUTALBITAL, ASPIRIN, CAFFEINE AND CODEINE PHOSPHATE 50; 325; 40; 30 MG/1; MG/1; MG/1; MG/1
1 CAPSULE ORAL EVERY 12 HOURS PRN
Qty: 60 CAPSULE | Refills: 0 | Status: SHIPPED | OUTPATIENT
Start: 2021-01-21 | End: 2021-02-20

## 2021-03-01 DIAGNOSIS — G43.009 MIGRAINE WITHOUT AURA AND WITHOUT STATUS MIGRAINOSUS, NOT INTRACTABLE: Primary | ICD-10-CM

## 2021-03-01 RX ORDER — BUTALBITAL, ASPIRIN, CAFFEINE AND CODEINE PHOSPHATE 50; 325; 40; 30 MG/1; MG/1; MG/1; MG/1
1 CAPSULE ORAL EVERY 12 HOURS PRN
COMMUNITY
End: 2021-03-01 | Stop reason: SDUPTHER

## 2021-03-01 RX ORDER — BUTALBITAL, ASPIRIN, CAFFEINE AND CODEINE PHOSPHATE 50; 325; 40; 30 MG/1; MG/1; MG/1; MG/1
1 CAPSULE ORAL EVERY 12 HOURS PRN
Qty: 60 CAPSULE | Refills: 0 | Status: SHIPPED | OUTPATIENT
Start: 2021-03-01 | End: 2021-03-22 | Stop reason: SDUPTHER

## 2021-03-05 ENCOUNTER — TELEPHONE (OUTPATIENT)
Dept: FAMILY MEDICINE | Facility: CLINIC | Age: 66
End: 2021-03-05

## 2021-03-05 DIAGNOSIS — Z79.899 ENCOUNTER FOR LONG-TERM (CURRENT) USE OF OTHER MEDICATIONS: Primary | ICD-10-CM

## 2021-03-22 ENCOUNTER — OFFICE VISIT (OUTPATIENT)
Dept: FAMILY MEDICINE | Facility: CLINIC | Age: 66
End: 2021-03-22
Payer: MEDICARE

## 2021-03-22 VITALS
DIASTOLIC BLOOD PRESSURE: 60 MMHG | SYSTOLIC BLOOD PRESSURE: 110 MMHG | HEART RATE: 72 BPM | HEIGHT: 65 IN | BODY MASS INDEX: 25.49 KG/M2 | WEIGHT: 153 LBS

## 2021-03-22 DIAGNOSIS — E55.9 VITAMIN D DEFICIENCY: ICD-10-CM

## 2021-03-22 DIAGNOSIS — R07.81 RIB PAIN: ICD-10-CM

## 2021-03-22 DIAGNOSIS — I10 HYPERTENSION, ESSENTIAL: ICD-10-CM

## 2021-03-22 DIAGNOSIS — F32.1 MODERATE MAJOR DEPRESSION: ICD-10-CM

## 2021-03-22 DIAGNOSIS — Z78.0 MENOPAUSE: ICD-10-CM

## 2021-03-22 DIAGNOSIS — G43.009 MIGRAINE WITHOUT AURA AND WITHOUT STATUS MIGRAINOSUS, NOT INTRACTABLE: ICD-10-CM

## 2021-03-22 DIAGNOSIS — E03.9 HYPOTHYROIDISM, UNSPECIFIED TYPE: ICD-10-CM
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
## 2021-03-22 DIAGNOSIS — Z79.899 HIGH RISK MEDICATION USE: Primary | ICD-10-CM
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
## 2021-03-22 PROCEDURE — 99214 OFFICE O/P EST MOD 30 MIN: CPT | Mod: S$GLB,,, | Performed by: NURSE PRACTITIONER

## 2021-03-22 PROCEDURE — 99214 PR OFFICE/OUTPT VISIT, EST, LEVL IV, 30-39 MIN: ICD-10-PCS | Mod: S$GLB,,, | Performed by: NURSE PRACTITIONER

## 2021-03-22 RX ORDER — NAPROXEN 500 MG/1
500 TABLET ORAL 2 TIMES DAILY WITH MEALS
Qty: 30 TABLET | Refills: 0 | Status: SHIPPED | OUTPATIENT
Start: 2021-03-22 | End: 2023-06-13 | Stop reason: CLARIF

## 2021-03-22 RX ORDER — AMLODIPINE BESYLATE 5 MG/1
5 TABLET ORAL DAILY
Qty: 90 TABLET | Refills: 1 | Status: SHIPPED | OUTPATIENT
Start: 2021-03-22 | End: 2021-09-22 | Stop reason: SDUPTHER

## 2021-03-22 RX ORDER — GABAPENTIN 300 MG/1
300 CAPSULE ORAL 2 TIMES DAILY
Qty: 60 CAPSULE | Refills: 11 | Status: SHIPPED | OUTPATIENT
Start: 2021-03-22 | End: 2021-03-30

## 2021-03-22 RX ORDER — BUTALBITAL, ASPIRIN, CAFFEINE AND CODEINE PHOSPHATE 50; 325; 40; 30 MG/1; MG/1; MG/1; MG/1
1 CAPSULE ORAL EVERY 12 HOURS PRN
Qty: 60 CAPSULE | Refills: 0 | Status: SHIPPED | OUTPATIENT
Start: 2021-03-22 | End: 2021-04-28 | Stop reason: SDUPTHER

## 2021-03-22 RX ORDER — ESCITALOPRAM OXALATE 20 MG/1
20 TABLET ORAL DAILY
Qty: 90 TABLET | Refills: 1 | Status: SHIPPED | OUTPATIENT
Start: 2021-03-22 | End: 2021-09-22 | Stop reason: SDUPTHER

## 2021-03-22 RX ORDER — MUPIROCIN 20 MG/G
OINTMENT TOPICAL 3 TIMES DAILY
Qty: 22 G | Refills: 0 | Status: SHIPPED | OUTPATIENT
Start: 2021-03-22 | End: 2024-03-16

## 2021-03-22 RX ORDER — METOPROLOL TARTRATE 50 MG/1
50 TABLET ORAL DAILY
Qty: 90 TABLET | Refills: 1 | Status: SHIPPED | OUTPATIENT
Start: 2021-03-22 | End: 2021-09-22 | Stop reason: SDUPTHER

## 2021-03-22 RX ORDER — BUPROPION HYDROCHLORIDE 300 MG/1
300 TABLET ORAL DAILY
Qty: 90 TABLET | Refills: 1 | Status: SHIPPED | OUTPATIENT
Start: 2021-03-22 | End: 2021-09-22 | Stop reason: SDUPTHER

## 2021-03-25 ENCOUNTER — TELEPHONE (OUTPATIENT)
Dept: FAMILY MEDICINE | Facility: CLINIC | Age: 66
End: 2021-03-25

## 2021-04-06 ENCOUNTER — TELEPHONE (OUTPATIENT)
Dept: FAMILY MEDICINE | Facility: CLINIC | Age: 66
End: 2021-04-06

## 2021-04-09 ENCOUNTER — PATIENT MESSAGE (OUTPATIENT)
Dept: FAMILY MEDICINE | Facility: CLINIC | Age: 66
End: 2021-04-09

## 2021-04-09 DIAGNOSIS — Z79.899 ENCOUNTER FOR LONG-TERM (CURRENT) USE OF OTHER MEDICATIONS: ICD-10-CM

## 2021-04-09 DIAGNOSIS — Z00.00 ROUTINE GENERAL MEDICAL EXAMINATION AT A HEALTH CARE FACILITY: Primary | ICD-10-CM

## 2021-04-09 DIAGNOSIS — Z79.899 HIGH RISK MEDICATION USE: ICD-10-CM

## 2021-04-09 DIAGNOSIS — E03.9 HYPOTHYROIDISM, UNSPECIFIED TYPE: ICD-10-CM

## 2021-04-09 DIAGNOSIS — I10 HYPERTENSION, ESSENTIAL: ICD-10-CM

## 2021-04-21 ENCOUNTER — LAB VISIT (OUTPATIENT)
Dept: LAB | Facility: HOSPITAL | Age: 66
End: 2021-04-21
Attending: NURSE PRACTITIONER
Payer: MEDICARE

## 2021-04-21 DIAGNOSIS — Z79.899 HIGH RISK MEDICATION USE: ICD-10-CM

## 2021-04-21 DIAGNOSIS — E03.9 HYPOTHYROIDISM, UNSPECIFIED TYPE: ICD-10-CM

## 2021-04-21 DIAGNOSIS — I10 HYPERTENSION, ESSENTIAL: ICD-10-CM

## 2021-04-21 DIAGNOSIS — Z00.00 ROUTINE GENERAL MEDICAL EXAMINATION AT A HEALTH CARE FACILITY: ICD-10-CM

## 2021-04-21 DIAGNOSIS — Z79.899 ENCOUNTER FOR LONG-TERM (CURRENT) USE OF OTHER MEDICATIONS: ICD-10-CM

## 2021-04-21 LAB
ALBUMIN SERPL BCP-MCNC: 3.5 G/DL (ref 3.5–5.2)
ALBUMIN/CREAT UR: 8.8 UG/MG (ref 0–30)
ALP SERPL-CCNC: 101 U/L (ref 55–135)
ALT SERPL W/O P-5'-P-CCNC: 38 U/L (ref 10–44)
ANION GAP SERPL CALC-SCNC: 8 MMOL/L (ref 8–16)
AST SERPL-CCNC: 32 U/L (ref 10–40)
BACTERIA #/AREA URNS HPF: NEGATIVE /HPF
BASOPHILS # BLD AUTO: 0.06 K/UL (ref 0–0.2)
BASOPHILS NFR BLD: 1.2 % (ref 0–1.9)
BILIRUB SERPL-MCNC: 0.6 MG/DL (ref 0.1–1)
BILIRUB UR QL STRIP: NEGATIVE
BUN SERPL-MCNC: 19 MG/DL (ref 8–23)
CALCIUM SERPL-MCNC: 8.6 MG/DL (ref 8.7–10.5)
CHLORIDE SERPL-SCNC: 105 MMOL/L (ref 95–110)
CHOLEST SERPL-MCNC: 227 MG/DL (ref 120–199)
CHOLEST/HDLC SERPL: 3.2 {RATIO} (ref 2–5)
CLARITY UR: CLEAR
CO2 SERPL-SCNC: 28 MMOL/L (ref 23–29)
COLOR UR: YELLOW
CREAT SERPL-MCNC: 0.8 MG/DL (ref 0.5–1.4)
CREAT UR-MCNC: 94 MG/DL (ref 15–325)
DIFFERENTIAL METHOD: ABNORMAL
EOSINOPHIL # BLD AUTO: 0.2 K/UL (ref 0–0.5)
EOSINOPHIL NFR BLD: 4.8 % (ref 0–8)
ERYTHROCYTE [DISTWIDTH] IN BLOOD BY AUTOMATED COUNT: 11.5 % (ref 11.5–14.5)
EST. GFR  (AFRICAN AMERICAN): >60 ML/MIN/1.73 M^2
EST. GFR  (NON AFRICAN AMERICAN): >60 ML/MIN/1.73 M^2
GLUCOSE SERPL-MCNC: 92 MG/DL (ref 70–110)
GLUCOSE UR QL STRIP: NEGATIVE
HCT VFR BLD AUTO: 41.8 % (ref 37–48.5)
HDLC SERPL-MCNC: 70 MG/DL (ref 40–75)
HDLC SERPL: 30.8 % (ref 20–50)
HGB BLD-MCNC: 13.6 G/DL (ref 12–16)
HGB UR QL STRIP: ABNORMAL
HYALINE CASTS #/AREA URNS LPF: 2 /LPF
IMM GRANULOCYTES # BLD AUTO: 0.01 K/UL (ref 0–0.04)
IMM GRANULOCYTES NFR BLD AUTO: 0.2 % (ref 0–0.5)
KETONES UR QL STRIP: NEGATIVE
LDLC SERPL CALC-MCNC: 141 MG/DL (ref 63–159)
LEUKOCYTE ESTERASE UR QL STRIP: ABNORMAL
LYMPHOCYTES # BLD AUTO: 1.6 K/UL (ref 1–4.8)
LYMPHOCYTES NFR BLD: 31.4 % (ref 18–48)
MCH RBC QN AUTO: 33.7 PG (ref 27–31)
MCHC RBC AUTO-ENTMCNC: 32.5 G/DL (ref 32–36)
MCV RBC AUTO: 104 FL (ref 82–98)
MICROALBUMIN UR DL<=1MG/L-MCNC: 8.3 UG/ML
MICROSCOPIC COMMENT: ABNORMAL
MONOCYTES # BLD AUTO: 0.5 K/UL (ref 0.3–1)
MONOCYTES NFR BLD: 10.5 % (ref 4–15)
NEUTROPHILS # BLD AUTO: 2.6 K/UL (ref 1.8–7.7)
NEUTROPHILS NFR BLD: 51.9 % (ref 38–73)
NITRITE UR QL STRIP: NEGATIVE
NONHDLC SERPL-MCNC: 157 MG/DL
NRBC BLD-RTO: 0 /100 WBC
PH UR STRIP: 6 [PH] (ref 5–8)
PLATELET # BLD AUTO: 223 K/UL (ref 150–450)
PMV BLD AUTO: 10.6 FL (ref 9.2–12.9)
POTASSIUM SERPL-SCNC: 4.6 MMOL/L (ref 3.5–5.1)
PROT SERPL-MCNC: 6.2 G/DL (ref 6–8.4)
PROT UR QL STRIP: NEGATIVE
RBC # BLD AUTO: 4.04 M/UL (ref 4–5.4)
RBC #/AREA URNS HPF: 2 /HPF (ref 0–4)
SODIUM SERPL-SCNC: 141 MMOL/L (ref 136–145)
SP GR UR STRIP: 1.02 (ref 1–1.03)
SQUAMOUS #/AREA URNS HPF: 1 /HPF
T4 FREE SERPL-MCNC: 0.92 NG/DL (ref 0.71–1.51)
TRIGL SERPL-MCNC: 80 MG/DL (ref 30–150)
TSH SERPL DL<=0.005 MIU/L-ACNC: 0.27 UIU/ML (ref 0.34–5.6)
URN SPEC COLLECT METH UR: ABNORMAL
UROBILINOGEN UR STRIP-ACNC: NEGATIVE EU/DL
WBC # BLD AUTO: 5.03 K/UL (ref 3.9–12.7)
WBC #/AREA URNS HPF: 7 /HPF (ref 0–5)

## 2021-04-21 PROCEDURE — 84443 ASSAY THYROID STIM HORMONE: CPT | Performed by: NURSE PRACTITIONER

## 2021-04-21 PROCEDURE — 81001 URINALYSIS AUTO W/SCOPE: CPT | Performed by: NURSE PRACTITIONER

## 2021-04-21 PROCEDURE — 85025 COMPLETE CBC W/AUTO DIFF WBC: CPT | Performed by: NURSE PRACTITIONER

## 2021-04-21 PROCEDURE — 82570 ASSAY OF URINE CREATININE: CPT | Performed by: NURSE PRACTITIONER

## 2021-04-21 PROCEDURE — 80061 LIPID PANEL: CPT | Performed by: NURSE PRACTITIONER

## 2021-04-21 PROCEDURE — 80053 COMPREHEN METABOLIC PANEL: CPT | Performed by: NURSE PRACTITIONER

## 2021-04-21 PROCEDURE — 36415 COLL VENOUS BLD VENIPUNCTURE: CPT | Performed by: NURSE PRACTITIONER

## 2021-04-21 PROCEDURE — 84439 ASSAY OF FREE THYROXINE: CPT | Performed by: NURSE PRACTITIONER

## 2021-04-21 PROCEDURE — 82043 UR ALBUMIN QUANTITATIVE: CPT | Performed by: NURSE PRACTITIONER

## 2021-04-27 ENCOUNTER — TELEPHONE (OUTPATIENT)
Dept: FAMILY MEDICINE | Facility: CLINIC | Age: 66
End: 2021-04-27

## 2021-04-27 DIAGNOSIS — G43.009 MIGRAINE WITHOUT AURA AND WITHOUT STATUS MIGRAINOSUS, NOT INTRACTABLE: ICD-10-CM

## 2021-04-27 RX ORDER — BUTALBITAL, ASPIRIN, CAFFEINE AND CODEINE PHOSPHATE 50; 325; 40; 30 MG/1; MG/1; MG/1; MG/1
1 CAPSULE ORAL EVERY 12 HOURS PRN
Qty: 60 CAPSULE | Refills: 0 | Status: CANCELLED | OUTPATIENT
Start: 2021-04-27

## 2021-04-28 RX ORDER — BUTALBITAL, ASPIRIN, CAFFEINE AND CODEINE PHOSPHATE 50; 325; 40; 30 MG/1; MG/1; MG/1; MG/1
1 CAPSULE ORAL EVERY 12 HOURS PRN
Qty: 60 CAPSULE | Refills: 0 | Status: SHIPPED | OUTPATIENT
Start: 2021-04-30 | End: 2021-05-28 | Stop reason: SDUPTHER

## 2021-05-28 DIAGNOSIS — G43.009 MIGRAINE WITHOUT AURA AND WITHOUT STATUS MIGRAINOSUS, NOT INTRACTABLE: ICD-10-CM

## 2021-05-28 RX ORDER — BUTALBITAL, ASPIRIN, CAFFEINE AND CODEINE PHOSPHATE 50; 325; 40; 30 MG/1; MG/1; MG/1; MG/1
1 CAPSULE ORAL EVERY 12 HOURS PRN
Qty: 60 CAPSULE | Refills: 0 | Status: SHIPPED | OUTPATIENT
Start: 2021-05-28 | End: 2021-06-28 | Stop reason: SDUPTHER

## 2021-06-28 DIAGNOSIS — G43.009 MIGRAINE WITHOUT AURA AND WITHOUT STATUS MIGRAINOSUS, NOT INTRACTABLE: ICD-10-CM

## 2021-06-28 RX ORDER — BUTALBITAL, ASPIRIN, CAFFEINE AND CODEINE PHOSPHATE 50; 325; 40; 30 MG/1; MG/1; MG/1; MG/1
1 CAPSULE ORAL EVERY 12 HOURS PRN
Qty: 60 CAPSULE | Refills: 0 | Status: SHIPPED | OUTPATIENT
Start: 2021-06-28 | End: 2021-07-28 | Stop reason: SDUPTHER

## 2021-07-28 DIAGNOSIS — G43.009 MIGRAINE WITHOUT AURA AND WITHOUT STATUS MIGRAINOSUS, NOT INTRACTABLE: ICD-10-CM

## 2021-07-28 RX ORDER — BUTALBITAL, ASPIRIN, CAFFEINE AND CODEINE PHOSPHATE 50; 325; 40; 30 MG/1; MG/1; MG/1; MG/1
1 CAPSULE ORAL EVERY 12 HOURS PRN
Qty: 60 CAPSULE | Refills: 0 | Status: SHIPPED | OUTPATIENT
Start: 2021-07-28 | End: 2021-08-27 | Stop reason: SDUPTHER

## 2021-08-13 ENCOUNTER — LAB VISIT (OUTPATIENT)
Dept: LAB | Facility: HOSPITAL | Age: 66
End: 2021-08-13
Attending: INTERNAL MEDICINE
Payer: MEDICARE

## 2021-08-13 DIAGNOSIS — I51.9 MYXEDEMA HEART DISEASE: Primary | ICD-10-CM

## 2021-08-13 DIAGNOSIS — E03.9 MYXEDEMA HEART DISEASE: Primary | ICD-10-CM

## 2021-08-13 LAB
T4 FREE SERPL-MCNC: 0.97 NG/DL (ref 0.71–1.51)
TSH SERPL DL<=0.005 MIU/L-ACNC: 0.25 UIU/ML (ref 0.34–5.6)

## 2021-08-13 PROCEDURE — 36415 COLL VENOUS BLD VENIPUNCTURE: CPT | Performed by: INTERNAL MEDICINE

## 2021-08-13 PROCEDURE — 84443 ASSAY THYROID STIM HORMONE: CPT | Performed by: INTERNAL MEDICINE

## 2021-08-13 PROCEDURE — 84439 ASSAY OF FREE THYROXINE: CPT | Performed by: INTERNAL MEDICINE

## 2021-08-26 ENCOUNTER — TELEPHONE (OUTPATIENT)
Dept: FAMILY MEDICINE | Facility: CLINIC | Age: 66
End: 2021-08-26

## 2021-08-26 DIAGNOSIS — G43.009 MIGRAINE WITHOUT AURA AND WITHOUT STATUS MIGRAINOSUS, NOT INTRACTABLE: ICD-10-CM

## 2021-08-27 RX ORDER — BUTALBITAL, ASPIRIN, CAFFEINE AND CODEINE PHOSPHATE 50; 325; 40; 30 MG/1; MG/1; MG/1; MG/1
1 CAPSULE ORAL EVERY 12 HOURS PRN
Qty: 30 CAPSULE | Refills: 0 | Status: SHIPPED | OUTPATIENT
Start: 2021-08-27 | End: 2021-09-22 | Stop reason: SDUPTHER

## 2021-09-22 ENCOUNTER — OFFICE VISIT (OUTPATIENT)
Dept: FAMILY MEDICINE | Facility: CLINIC | Age: 66
End: 2021-09-22
Payer: MEDICARE

## 2021-09-22 VITALS
SYSTOLIC BLOOD PRESSURE: 124 MMHG | BODY MASS INDEX: 24.49 KG/M2 | DIASTOLIC BLOOD PRESSURE: 68 MMHG | HEIGHT: 65 IN | HEART RATE: 72 BPM | TEMPERATURE: 98 F | WEIGHT: 147 LBS

## 2021-09-22 DIAGNOSIS — E03.9 HYPOTHYROIDISM, UNSPECIFIED TYPE: ICD-10-CM

## 2021-09-22 DIAGNOSIS — Z23 NEED FOR INFLUENZA VACCINATION: Primary | ICD-10-CM

## 2021-09-22 DIAGNOSIS — F32.1 MODERATE MAJOR DEPRESSION: ICD-10-CM

## 2021-09-22 DIAGNOSIS — Z78.0 MENOPAUSE: ICD-10-CM

## 2021-09-22 DIAGNOSIS — I10 HYPERTENSION, ESSENTIAL: ICD-10-CM

## 2021-09-22 DIAGNOSIS — Z79.899 HIGH RISK MEDICATION USE: ICD-10-CM

## 2021-09-22 DIAGNOSIS — N20.0 KIDNEY STONE: ICD-10-CM

## 2021-09-22 DIAGNOSIS — G43.009 MIGRAINE WITHOUT AURA AND WITHOUT STATUS MIGRAINOSUS, NOT INTRACTABLE: ICD-10-CM

## 2021-09-22 PROCEDURE — 90682 RIV4 VACC RECOMBINANT DNA IM: CPT | Mod: S$GLB,,, | Performed by: NURSE PRACTITIONER

## 2021-09-22 PROCEDURE — 99214 OFFICE O/P EST MOD 30 MIN: CPT | Mod: 25,S$GLB,, | Performed by: NURSE PRACTITIONER

## 2021-09-22 PROCEDURE — 99214 PR OFFICE/OUTPT VISIT, EST, LEVL IV, 30-39 MIN: ICD-10-PCS | Mod: 25,S$GLB,, | Performed by: NURSE PRACTITIONER

## 2021-09-22 PROCEDURE — G0008 ADMIN INFLUENZA VIRUS VAC: HCPCS | Mod: S$GLB,,, | Performed by: NURSE PRACTITIONER

## 2021-09-22 PROCEDURE — 90682 FLU VACCINE - QUADRIVALENT (RECOMBINANT) PRESERVATIVE FREE: ICD-10-PCS | Mod: S$GLB,,, | Performed by: NURSE PRACTITIONER

## 2021-09-22 PROCEDURE — G0008 FLU VACCINE - QUADRIVALENT (RECOMBINANT) PRESERVATIVE FREE: ICD-10-PCS | Mod: S$GLB,,, | Performed by: NURSE PRACTITIONER

## 2021-09-22 RX ORDER — ESCITALOPRAM OXALATE 20 MG/1
20 TABLET ORAL DAILY
Qty: 90 TABLET | Refills: 1 | Status: SHIPPED | OUTPATIENT
Start: 2021-09-22 | End: 2022-03-21 | Stop reason: SDUPTHER

## 2021-09-22 RX ORDER — BUTALBITAL, ASPIRIN, CAFFEINE AND CODEINE PHOSPHATE 50; 325; 40; 30 MG/1; MG/1; MG/1; MG/1
1 CAPSULE ORAL EVERY 12 HOURS PRN
Qty: 30 CAPSULE | Refills: 0 | Status: SHIPPED | OUTPATIENT
Start: 2021-09-27 | End: 2021-10-27

## 2021-09-22 RX ORDER — BUPROPION HYDROCHLORIDE 300 MG/1
300 TABLET ORAL DAILY
Qty: 90 TABLET | Refills: 1 | Status: SHIPPED | OUTPATIENT
Start: 2021-09-22 | End: 2022-03-21 | Stop reason: SDUPTHER

## 2021-09-22 RX ORDER — METOPROLOL TARTRATE 50 MG/1
50 TABLET ORAL DAILY
Qty: 90 TABLET | Refills: 1 | Status: SHIPPED | OUTPATIENT
Start: 2021-09-22 | End: 2022-03-21 | Stop reason: SDUPTHER

## 2021-09-22 RX ORDER — AMLODIPINE BESYLATE 5 MG/1
5 TABLET ORAL DAILY
Qty: 90 TABLET | Refills: 1 | Status: SHIPPED | OUTPATIENT
Start: 2021-09-22 | End: 2022-03-21 | Stop reason: SDUPTHER

## 2021-09-22 RX ORDER — TOPIRAMATE 25 MG/1
25 TABLET ORAL 2 TIMES DAILY
Qty: 60 TABLET | Refills: 11 | Status: SHIPPED | OUTPATIENT
Start: 2021-09-22 | End: 2023-05-09

## 2021-09-22 RX ORDER — LEVOTHYROXINE SODIUM 112 UG/1
112 TABLET ORAL DAILY
COMMUNITY
Start: 2021-08-26

## 2021-09-23 ENCOUNTER — TELEPHONE (OUTPATIENT)
Dept: FAMILY MEDICINE | Facility: CLINIC | Age: 66
End: 2021-09-23

## 2021-09-23 DIAGNOSIS — R11.0 NAUSEA: ICD-10-CM

## 2021-09-23 DIAGNOSIS — N20.0 KIDNEY STONE: Primary | ICD-10-CM

## 2021-09-23 RX ORDER — PROMETHAZINE HYDROCHLORIDE 25 MG/1
25 TABLET ORAL EVERY 6 HOURS PRN
Qty: 10 TABLET | Refills: 0 | Status: SHIPPED | OUTPATIENT
Start: 2021-09-23 | End: 2023-06-13 | Stop reason: CLARIF

## 2021-09-23 RX ORDER — TAMSULOSIN HYDROCHLORIDE 0.4 MG/1
0.4 CAPSULE ORAL DAILY
Qty: 30 CAPSULE | Refills: 11 | Status: SHIPPED | OUTPATIENT
Start: 2021-09-23 | End: 2023-05-09

## 2021-11-09 RX ORDER — BUTALBITAL, ASPIRIN, CAFFEINE AND CODEINE PHOSPHATE 50; 325; 40; 30 MG/1; MG/1; MG/1; MG/1
1 CAPSULE ORAL EVERY 12 HOURS PRN
Qty: 30 CAPSULE | Refills: 0 | Status: SHIPPED | OUTPATIENT
Start: 2021-11-09 | End: 2021-12-07 | Stop reason: SDUPTHER

## 2021-12-02 ENCOUNTER — LAB VISIT (OUTPATIENT)
Dept: LAB | Facility: HOSPITAL | Age: 66
End: 2021-12-02
Attending: INTERNAL MEDICINE
Payer: MEDICARE

## 2021-12-02 DIAGNOSIS — E04.2 NONTOXIC MULTINODULAR GOITER: ICD-10-CM

## 2021-12-02 DIAGNOSIS — E55.9 AVITAMINOSIS D: Primary | ICD-10-CM

## 2021-12-02 DIAGNOSIS — E06.5 HYPOTHYROIDISM DUE TO FIBROUS INVASIVE THYROIDITIS: ICD-10-CM

## 2021-12-02 DIAGNOSIS — E03.8 HYPOTHYROIDISM DUE TO FIBROUS INVASIVE THYROIDITIS: ICD-10-CM

## 2021-12-02 LAB
25(OH)D3+25(OH)D2 SERPL-MCNC: 68 NG/ML (ref 30–96)
T4 FREE SERPL-MCNC: 0.98 NG/DL (ref 0.71–1.51)
TSH SERPL DL<=0.005 MIU/L-ACNC: 1.05 UIU/ML (ref 0.34–5.6)

## 2021-12-02 PROCEDURE — 82306 VITAMIN D 25 HYDROXY: CPT | Performed by: INTERNAL MEDICINE

## 2021-12-02 PROCEDURE — 84439 ASSAY OF FREE THYROXINE: CPT | Performed by: INTERNAL MEDICINE

## 2021-12-02 PROCEDURE — 84443 ASSAY THYROID STIM HORMONE: CPT | Performed by: INTERNAL MEDICINE

## 2021-12-02 PROCEDURE — 36415 COLL VENOUS BLD VENIPUNCTURE: CPT | Performed by: INTERNAL MEDICINE

## 2021-12-07 ENCOUNTER — TELEPHONE (OUTPATIENT)
Dept: FAMILY MEDICINE | Facility: CLINIC | Age: 66
End: 2021-12-07
Payer: MEDICARE

## 2021-12-07 RX ORDER — BUTALBITAL, ASPIRIN, CAFFEINE AND CODEINE PHOSPHATE 50; 325; 40; 30 MG/1; MG/1; MG/1; MG/1
1 CAPSULE ORAL DAILY PRN
Qty: 30 CAPSULE | Refills: 0 | Status: SHIPPED | OUTPATIENT
Start: 2021-12-10 | End: 2022-01-06 | Stop reason: SDUPTHER

## 2022-01-05 ENCOUNTER — TELEPHONE (OUTPATIENT)
Dept: FAMILY MEDICINE | Facility: CLINIC | Age: 67
End: 2022-01-05
Payer: MEDICARE

## 2022-01-05 NOTE — TELEPHONE ENCOUNTER
Pt left a VM that she needed a refill on Butalbital and that the pharmacy did not have a prescription of this on file. I spoke with the pharmacy who states they last filled the prescription on 12/7 and they did not receive the rx on 12/10. Spoke with pt who states she usually takes the precription every other day but she states she knows she is about to be in a very stressful time so she would like this on hand when she needs it. Looks like she fills often.     7/28 #60  8/27 #30  9/24 #30  11/10 #30  12/7 #30

## 2022-01-05 NOTE — TELEPHONE ENCOUNTER
----- Message from Monik Jaramillo sent at 1/5/2022  9:50 AM CST -----  [facility]   [facility] on [facility]  [phone]

## 2022-01-06 RX ORDER — BUTALBITAL, ASPIRIN, CAFFEINE AND CODEINE PHOSPHATE 50; 325; 40; 30 MG/1; MG/1; MG/1; MG/1
1 CAPSULE ORAL DAILY PRN
Qty: 30 CAPSULE | Refills: 0 | Status: SHIPPED | OUTPATIENT
Start: 2022-01-06 | End: 2022-02-05

## 2022-02-07 ENCOUNTER — TELEPHONE (OUTPATIENT)
Dept: FAMILY MEDICINE | Facility: CLINIC | Age: 67
End: 2022-02-07
Payer: MEDICARE

## 2022-02-07 RX ORDER — BUTALBITAL, ASPIRIN, CAFFEINE AND CODEINE PHOSPHATE 50; 325; 40; 30 MG/1; MG/1; MG/1; MG/1
1 CAPSULE ORAL EVERY 6 HOURS PRN
Qty: 30 CAPSULE | Refills: 0 | Status: SHIPPED | OUTPATIENT
Start: 2022-02-07 | End: 2022-03-07 | Stop reason: SDUPTHER

## 2022-02-07 NOTE — TELEPHONE ENCOUNTER
----- Message from Monik Jaramillo sent at 2/7/2022 12:05 PM CST -----  Vm-pt needs refill on butalbital with asprin and codine  [facility] on [facility]   [phone]

## 2022-03-07 NOTE — TELEPHONE ENCOUNTER
----- Message from Monik Jaramillo sent at 3/7/2022  3:30 PM CST -----  - pt needs refill on butalbital   [facility]   [phone]

## 2022-03-08 ENCOUNTER — TELEPHONE (OUTPATIENT)
Dept: FAMILY MEDICINE | Facility: CLINIC | Age: 67
End: 2022-03-08
Payer: MEDICARE

## 2022-03-08 RX ORDER — BUTALBITAL, ASPIRIN, CAFFEINE AND CODEINE PHOSPHATE 50; 325; 40; 30 MG/1; MG/1; MG/1; MG/1
1 CAPSULE ORAL EVERY 6 HOURS PRN
Qty: 30 CAPSULE | Refills: 0 | Status: SHIPPED | OUTPATIENT
Start: 2022-03-08 | End: 2022-03-21 | Stop reason: SDUPTHER

## 2022-03-08 NOTE — TELEPHONE ENCOUNTER
----- Message from Monik Jaramillo sent at 3/8/2022  3:52 PM CST -----  - pt called yesterday for a refill on butalbital and [facility] still does not have it   [phone]

## 2022-03-21 ENCOUNTER — OFFICE VISIT (OUTPATIENT)
Dept: FAMILY MEDICINE | Facility: CLINIC | Age: 67
End: 2022-03-21
Payer: MEDICARE

## 2022-03-21 VITALS
WEIGHT: 149 LBS | HEART RATE: 68 BPM | SYSTOLIC BLOOD PRESSURE: 126 MMHG | DIASTOLIC BLOOD PRESSURE: 62 MMHG | BODY MASS INDEX: 24.83 KG/M2 | HEIGHT: 65 IN

## 2022-03-21 DIAGNOSIS — G44.209 TENSION-TYPE HEADACHE, NOT INTRACTABLE, UNSPECIFIED CHRONICITY PATTERN: ICD-10-CM

## 2022-03-21 DIAGNOSIS — Z12.31 OTHER SCREENING MAMMOGRAM: Primary | ICD-10-CM

## 2022-03-21 DIAGNOSIS — F32.1 MODERATE MAJOR DEPRESSION: ICD-10-CM

## 2022-03-21 DIAGNOSIS — Z78.0 ENCOUNTER FOR OSTEOPOROSIS SCREENING IN ASYMPTOMATIC POSTMENOPAUSAL PATIENT: ICD-10-CM

## 2022-03-21 DIAGNOSIS — J30.1 SEASONAL ALLERGIC RHINITIS DUE TO POLLEN: ICD-10-CM

## 2022-03-21 DIAGNOSIS — I10 HYPERTENSION, ESSENTIAL: ICD-10-CM

## 2022-03-21 DIAGNOSIS — Z12.31 SCREENING MAMMOGRAM FOR BREAST CANCER: ICD-10-CM

## 2022-03-21 DIAGNOSIS — F33.1 MODERATE EPISODE OF RECURRENT MAJOR DEPRESSIVE DISORDER: ICD-10-CM

## 2022-03-21 DIAGNOSIS — E03.9 HYPOTHYROIDISM, UNSPECIFIED TYPE: ICD-10-CM

## 2022-03-21 DIAGNOSIS — Z13.820 ENCOUNTER FOR OSTEOPOROSIS SCREENING IN ASYMPTOMATIC POSTMENOPAUSAL PATIENT: ICD-10-CM

## 2022-03-21 DIAGNOSIS — Z79.899 HIGH RISK MEDICATION USE: ICD-10-CM

## 2022-03-21 PROCEDURE — 3008F PR BODY MASS INDEX (BMI) DOCUMENTED: ICD-10-PCS | Mod: S$GLB,,, | Performed by: NURSE PRACTITIONER

## 2022-03-21 PROCEDURE — 3008F BODY MASS INDEX DOCD: CPT | Mod: S$GLB,,, | Performed by: NURSE PRACTITIONER

## 2022-03-21 PROCEDURE — 99214 PR OFFICE/OUTPT VISIT, EST, LEVL IV, 30-39 MIN: ICD-10-PCS | Mod: S$GLB,,, | Performed by: NURSE PRACTITIONER

## 2022-03-21 PROCEDURE — 1159F MED LIST DOCD IN RCRD: CPT | Mod: S$GLB,,, | Performed by: NURSE PRACTITIONER

## 2022-03-21 PROCEDURE — 3074F SYST BP LT 130 MM HG: CPT | Mod: S$GLB,,, | Performed by: NURSE PRACTITIONER

## 2022-03-21 PROCEDURE — 3074F PR MOST RECENT SYSTOLIC BLOOD PRESSURE < 130 MM HG: ICD-10-PCS | Mod: S$GLB,,, | Performed by: NURSE PRACTITIONER

## 2022-03-21 PROCEDURE — 99214 OFFICE O/P EST MOD 30 MIN: CPT | Mod: S$GLB,,, | Performed by: NURSE PRACTITIONER

## 2022-03-21 PROCEDURE — 3078F DIAST BP <80 MM HG: CPT | Mod: S$GLB,,, | Performed by: NURSE PRACTITIONER

## 2022-03-21 PROCEDURE — 3078F PR MOST RECENT DIASTOLIC BLOOD PRESSURE < 80 MM HG: ICD-10-PCS | Mod: S$GLB,,, | Performed by: NURSE PRACTITIONER

## 2022-03-21 PROCEDURE — 1159F PR MEDICATION LIST DOCUMENTED IN MEDICAL RECORD: ICD-10-PCS | Mod: S$GLB,,, | Performed by: NURSE PRACTITIONER

## 2022-03-21 RX ORDER — ESCITALOPRAM OXALATE 20 MG/1
20 TABLET ORAL DAILY
Qty: 90 TABLET | Refills: 1 | Status: SHIPPED | OUTPATIENT
Start: 2022-03-21 | End: 2022-09-19 | Stop reason: SDUPTHER

## 2022-03-21 RX ORDER — GABAPENTIN 300 MG/1
300 CAPSULE ORAL 2 TIMES DAILY
Qty: 180 CAPSULE | Refills: 1 | Status: SHIPPED | OUTPATIENT
Start: 2022-03-21 | End: 2022-09-19 | Stop reason: SDUPTHER

## 2022-03-21 RX ORDER — METOPROLOL TARTRATE 50 MG/1
50 TABLET ORAL DAILY
Qty: 90 TABLET | Refills: 1 | Status: SHIPPED | OUTPATIENT
Start: 2022-03-21 | End: 2022-09-19 | Stop reason: SDUPTHER

## 2022-03-21 RX ORDER — AMLODIPINE BESYLATE 5 MG/1
5 TABLET ORAL DAILY
Qty: 90 TABLET | Refills: 1 | Status: SHIPPED | OUTPATIENT
Start: 2022-03-21 | End: 2022-09-19 | Stop reason: SDUPTHER

## 2022-03-21 RX ORDER — GABAPENTIN 300 MG/1
CAPSULE ORAL
COMMUNITY
Start: 2021-12-12 | End: 2022-03-21 | Stop reason: SDUPTHER

## 2022-03-21 RX ORDER — BUPROPION HYDROCHLORIDE 300 MG/1
300 TABLET ORAL DAILY
Qty: 90 TABLET | Refills: 1 | Status: SHIPPED | OUTPATIENT
Start: 2022-03-21 | End: 2022-09-19 | Stop reason: SDUPTHER

## 2022-03-21 RX ORDER — BUTALBITAL, ASPIRIN, CAFFEINE AND CODEINE PHOSPHATE 50; 325; 40; 30 MG/1; MG/1; MG/1; MG/1
1 CAPSULE ORAL EVERY 6 HOURS PRN
Qty: 30 CAPSULE | Refills: 0 | Status: SHIPPED | OUTPATIENT
Start: 2022-04-08 | End: 2022-05-09 | Stop reason: SDUPTHER

## 2022-03-21 RX ORDER — BUTALBITAL, ASPIRIN, CAFFEINE AND CODEINE PHOSPHATE 50; 325; 40; 30 MG/1; MG/1; MG/1; MG/1
1 CAPSULE ORAL EVERY 6 HOURS PRN
Qty: 30 CAPSULE | Refills: 0 | Status: SHIPPED | OUTPATIENT
Start: 2022-04-08 | End: 2022-03-21 | Stop reason: SDUPTHER

## 2022-03-21 NOTE — TELEPHONE ENCOUNTER
----- Message from Michaela Mason sent at 3/21/2022  2:02 PM CDT -----  Sameera arriaga's calling about the Butalbital-Asprin-codeine said the script needs to be resent with the note that this is medically necessary for more than 7 days attached to it.

## 2022-03-21 NOTE — PROGRESS NOTES
SUBJECTIVE:    Patient ID: Eulalia Bermudez is a 66 y.o. female.    Chief Complaint: Follow-up (6mth, brought bottles, Mammo ordered, DEXA wants to wait and have Dr. Spencer order// SW)    66 year old female presents for check up.treated for migraines, depression, anxiety, hypothyroid..taking both wellbutrin and lexapro.feels like stress/anxiety is well managed.    Migraines have lessoned..' fioricet was decreased to 30 a month. . Sleeps ok most nights. Followed regularly by DR spencer.  Does not exercise. Has normal appetite.    travels with . bp is well controlled in office today. Does not check routinely at home. Due for labs.       Lab Visit on 2021   Component Date Value Ref Range Status    Free T4 2021 0.98  0.71 - 1.51 ng/dL Final    TSH 2021 1.050  0.340 - 5.600 uIU/mL Final    Vit D, 25-Hydroxy 2021 68  30 - 96 ng/mL Final       Past Medical History:   Diagnosis Date    Depression     Hypertension     Hypothyroidism     Kidney stones      Social History     Socioeconomic History    Marital status:    Tobacco Use    Smoking status: Never Smoker    Smokeless tobacco: Never Used   Substance and Sexual Activity    Alcohol use: Yes    Drug use: No    Sexual activity: Yes     Partners: Male     Past Surgical History:   Procedure Laterality Date     SECTION       Family History   Problem Relation Age of Onset    Cancer Mother     COPD Mother     Heart disease Mother     Hypertension Mother     Depression Mother     Hearing loss Mother     Emphysema Father     Breast cancer Paternal Aunt        Review of patient's allergies indicates:   Allergen Reactions    Imitrex [sumatriptan] Anaphylaxis    Egg Nausea Only    Penicillins Rash    Sulfa (sulfonamide antibiotics) Rash       Current Outpatient Medications:     cetirizine (ZYRTEC) 10 MG tablet, Take 10 mg by mouth once daily., Disp: , Rfl:     diclofenac sodium (VOLTAREN) 1 % Gel, Apply  topically 2 (two) times daily as needed., Disp: 100 g, Rfl: 0    folic acid (FOLVITE) 400 MCG tablet, Take 400 mcg by mouth once daily., Disp: , Rfl:     mupirocin (BACTROBAN) 2 % ointment, Apply topically 3 (three) times daily., Disp: 22 g, Rfl: 0    naproxen (NAPROSYN) 500 MG tablet, Take 1 tablet (500 mg total) by mouth 2 (two) times daily with meals., Disp: 30 tablet, Rfl: 0    promethazine (PHENERGAN) 25 MG tablet, Take 1 tablet (25 mg total) by mouth every 6 (six) hours as needed for Nausea., Disp: 10 tablet, Rfl: 0    tamsulosin (FLOMAX) 0.4 mg Cap, Take 1 capsule (0.4 mg total) by mouth once daily., Disp: 30 capsule, Rfl: 11    topiramate (TOPAMAX) 25 MG tablet, Take 1 tablet (25 mg total) by mouth 2 (two) times daily., Disp: 60 tablet, Rfl: 11    triamcinolone (NASACORT) 55 mcg nasal inhaler, 2 sprays by Nasal route once daily., Disp: 1 g, Rfl: 6    UNITHROID 112 mcg tablet, Take 112 mcg by mouth once daily., Disp: , Rfl:     vitamin D (VITAMIN D3) 1000 units Tab, Take 1,000 Units by mouth once daily., Disp: , Rfl:     amLODIPine (NORVASC) 5 MG tablet, Take 1 tablet (5 mg total) by mouth once daily., Disp: 90 tablet, Rfl: 1    buPROPion (WELLBUTRIN XL) 300 MG 24 hr tablet, Take 1 tablet (300 mg total) by mouth once daily., Disp: 90 tablet, Rfl: 1    [START ON 4/8/2022] codeine-butalbital-ASA-caffeine (BUTALBITAL COMPOUND-CODEINE) 26--40 mg Cap, Take 1 capsule by mouth every 6 (six) hours as needed (migraines)., Disp: 30 capsule, Rfl: 0    EScitalopram oxalate (LEXAPRO) 20 MG tablet, Take 1 tablet (20 mg total) by mouth once daily., Disp: 90 tablet, Rfl: 1    gabapentin (NEURONTIN) 300 MG capsule, Take 1 capsule (300 mg total) by mouth 2 (two) times daily., Disp: 180 capsule, Rfl: 1    metoprolol tartrate (LOPRESSOR) 50 MG tablet, Take 1 tablet (50 mg total) by mouth once daily., Disp: 90 tablet, Rfl: 1    Review of Systems   Constitutional: Negative for chills, fever and unexpected  "weight change.   HENT: Negative for ear pain, rhinorrhea and sore throat.    Eyes: Negative for pain and visual disturbance.   Respiratory: Negative for cough and shortness of breath.    Cardiovascular: Negative for chest pain, palpitations and leg swelling.   Gastrointestinal: Negative for abdominal pain, diarrhea, nausea and vomiting.   Genitourinary: Negative for difficulty urinating, hematuria and vaginal bleeding.   Musculoskeletal: Positive for arthralgias.   Skin: Negative for rash.   Neurological: Positive for headaches. Negative for dizziness and weakness.   Psychiatric/Behavioral: Negative for agitation and sleep disturbance. The patient is not nervous/anxious.           Objective:      Vitals:    03/21/22 1333   BP: 126/62   Pulse: 68   Weight: 67.6 kg (149 lb)   Height: 5' 5" (1.651 m)     Physical Exam  Constitutional:       General: She is not in acute distress.     Appearance: She is well-developed. She is not ill-appearing.   HENT:      Head: Normocephalic.      Right Ear: External ear normal.      Left Ear: External ear normal.   Eyes:      Conjunctiva/sclera: Conjunctivae normal.      Pupils: Pupils are equal, round, and reactive to light.   Neck:      Vascular: No JVD.   Cardiovascular:      Rate and Rhythm: Normal rate and regular rhythm.      Heart sounds: No murmur heard.  Pulmonary:      Effort: Pulmonary effort is normal.      Breath sounds: Normal breath sounds.   Abdominal:      General: Bowel sounds are normal.      Palpations: Abdomen is soft.   Musculoskeletal:         General: No deformity. Normal range of motion.      Cervical back: Normal range of motion and neck supple.   Lymphadenopathy:      Cervical: No cervical adenopathy.   Skin:     General: Skin is warm and dry.      Findings: No rash.   Neurological:      Mental Status: She is alert and oriented to person, place, and time.      Gait: Gait normal.   Psychiatric:         Speech: Speech normal.         Behavior: Behavior normal. "           Assessment:       1. Other screening mammogram    2. Moderate major depression    3. Hypertension, essential    4. High risk medication use    5. Hypothyroidism, unspecified type    6. Tension-type headache, not intractable, unspecified chronicity pattern    7. Screening mammogram for breast cancer    8. Encounter for osteoporosis screening in asymptomatic postmenopausal patient    9. Seasonal allergic rhinitis due to pollen    10. Moderate episode of recurrent major depressive disorder         Plan:       Other screening mammogram  -     Mammo Digital Screening Bilat; Future; Expected date: 03/21/2022    Moderate major depression  Comments:  increase lexapro to 20  Orders:  -     buPROPion (WELLBUTRIN XL) 300 MG 24 hr tablet; Take 1 tablet (300 mg total) by mouth once daily.  Dispense: 90 tablet; Refill: 1  -     EScitalopram oxalate (LEXAPRO) 20 MG tablet; Take 1 tablet (20 mg total) by mouth once daily.  Dispense: 90 tablet; Refill: 1    Hypertension, essential  -     amLODIPine (NORVASC) 5 MG tablet; Take 1 tablet (5 mg total) by mouth once daily.  Dispense: 90 tablet; Refill: 1  -     metoprolol tartrate (LOPRESSOR) 50 MG tablet; Take 1 tablet (50 mg total) by mouth once daily.  Dispense: 90 tablet; Refill: 1  -     CBC Auto Differential; Future; Expected date: 03/21/2022  -     Comprehensive Metabolic Panel; Future; Expected date: 03/21/2022  -     Lipid Panel; Future; Expected date: 03/21/2022  -     Microalbumin/Creatinine Ratio, Urine; Future; Expected date: 03/21/2022  -     Urinalysis, Reflex to Urine Culture Urine, Clean Catch; Future; Expected date: 03/21/2022    High risk medication use  -     CBC Auto Differential; Future; Expected date: 03/21/2022  -     Comprehensive Metabolic Panel; Future; Expected date: 03/21/2022  -     Lipid Panel; Future; Expected date: 03/21/2022  -     Microalbumin/Creatinine Ratio, Urine; Future; Expected date: 03/21/2022  -     Urinalysis, Reflex to Urine Culture  Urine, Clean Catch; Future; Expected date: 03/21/2022  -     DXA Bone Density Spine And Hip; Future; Expected date: 03/21/2022    Hypothyroidism, unspecified type    Tension-type headache, not intractable, unspecified chronicity pattern    Screening mammogram for breast cancer  -     Mammo Digital Screening Bilat; Future; Expected date: 03/21/2022    Encounter for osteoporosis screening in asymptomatic postmenopausal patient  -     DXA Bone Density Spine And Hip; Future; Expected date: 03/21/2022    Seasonal allergic rhinitis due to pollen    Moderate episode of recurrent major depressive disorder    Other orders  -     gabapentin (NEURONTIN) 300 MG capsule; Take 1 capsule (300 mg total) by mouth 2 (two) times daily.  Dispense: 180 capsule; Refill: 1  -     Discontinue: codeine-butalbital-ASA-caffeine (BUTALBITAL COMPOUND-CODEINE) 62--40 mg Cap; Take 1 capsule by mouth every 6 (six) hours as needed (migraines).  Dispense: 30 capsule; Refill: 0      Follow up in about 6 months (around 9/21/2022), or if symptoms worsen or fail to improve, for medication management.        3/27/2022 Lizy Noble

## 2022-04-05 ENCOUNTER — HOSPITAL ENCOUNTER (OUTPATIENT)
Dept: RADIOLOGY | Facility: HOSPITAL | Age: 67
Discharge: HOME | End: 2022-04-05
Attending: NURSE PRACTITIONER
Payer: MEDICARE

## 2022-04-05 ENCOUNTER — PATIENT MESSAGE (OUTPATIENT)
Dept: FAMILY MEDICINE | Facility: CLINIC | Age: 67
End: 2022-04-05

## 2022-04-05 ENCOUNTER — TELEPHONE (OUTPATIENT)
Dept: FAMILY MEDICINE | Facility: CLINIC | Age: 67
End: 2022-04-05
Payer: MEDICARE

## 2022-04-05 VITALS — BODY MASS INDEX: 24.83 KG/M2 | WEIGHT: 149.06 LBS | HEIGHT: 65 IN

## 2022-04-05 DIAGNOSIS — Z12.31 SCREENING MAMMOGRAM FOR BREAST CANCER: ICD-10-CM

## 2022-04-05 DIAGNOSIS — Z12.31 OTHER SCREENING MAMMOGRAM: ICD-10-CM

## 2022-04-05 DIAGNOSIS — Z13.820 ENCOUNTER FOR OSTEOPOROSIS SCREENING IN ASYMPTOMATIC POSTMENOPAUSAL PATIENT: ICD-10-CM

## 2022-04-05 DIAGNOSIS — Z78.0 ENCOUNTER FOR OSTEOPOROSIS SCREENING IN ASYMPTOMATIC POSTMENOPAUSAL PATIENT: ICD-10-CM

## 2022-04-05 DIAGNOSIS — Z79.899 HIGH RISK MEDICATION USE: ICD-10-CM

## 2022-04-05 PROCEDURE — 77080 DXA BONE DENSITY AXIAL: CPT | Mod: TC,PO

## 2022-04-05 PROCEDURE — 77063 BREAST TOMOSYNTHESIS BI: CPT | Mod: TC,PO

## 2022-04-05 PROCEDURE — 77067 SCR MAMMO BI INCL CAD: CPT | Mod: TC,PO

## 2022-04-05 NOTE — TELEPHONE ENCOUNTER
----- Message from Lizy Noble NP sent at 4/5/2022  1:49 PM CDT -----  Osteopenia. Calcium and vitamin d. Weight bearing exercises.

## 2022-05-09 RX ORDER — BUTALBITAL, ASPIRIN, CAFFEINE AND CODEINE PHOSPHATE 50; 325; 40; 30 MG/1; MG/1; MG/1; MG/1
1 CAPSULE ORAL EVERY 6 HOURS PRN
Qty: 30 CAPSULE | Refills: 0 | Status: SHIPPED | OUTPATIENT
Start: 2022-05-09 | End: 2022-06-09 | Stop reason: SDUPTHER

## 2022-05-09 NOTE — TELEPHONE ENCOUNTER
----- Message from Monik Jaramillo sent at 5/9/2022  1:12 PM CDT -----  [facility]   [phone]

## 2022-06-09 DIAGNOSIS — G43.009 MIGRAINE WITHOUT AURA AND WITHOUT STATUS MIGRAINOSUS, NOT INTRACTABLE: ICD-10-CM

## 2022-06-09 DIAGNOSIS — G44.209 TENSION-TYPE HEADACHE, NOT INTRACTABLE, UNSPECIFIED CHRONICITY PATTERN: Primary | ICD-10-CM

## 2022-06-09 RX ORDER — BUTALBITAL, ASPIRIN, CAFFEINE AND CODEINE PHOSPHATE 50; 325; 40; 30 MG/1; MG/1; MG/1; MG/1
1 CAPSULE ORAL EVERY 6 HOURS PRN
Qty: 30 CAPSULE | Refills: 0 | Status: CANCELLED | OUTPATIENT
Start: 2022-06-09 | End: 2022-07-09

## 2022-06-09 RX ORDER — BUTALBITAL, ASPIRIN, CAFFEINE AND CODEINE PHOSPHATE 50; 325; 40; 30 MG/1; MG/1; MG/1; MG/1
1 CAPSULE ORAL EVERY 6 HOURS PRN
Qty: 30 CAPSULE | Refills: 0 | Status: SHIPPED | OUTPATIENT
Start: 2022-06-09 | End: 2022-07-07 | Stop reason: SDUPTHER

## 2022-06-09 NOTE — TELEPHONE ENCOUNTER
----- Message from Monik Jaramillo sent at 6/9/2022 11:34 AM CDT -----  - pt needs refill on [facility]   [phone]

## 2022-06-16 ENCOUNTER — LAB VISIT (OUTPATIENT)
Dept: LAB | Facility: HOSPITAL | Age: 67
End: 2022-06-16
Attending: INTERNAL MEDICINE
Payer: MEDICARE

## 2022-06-16 DIAGNOSIS — E04.2 NONTOXIC MULTINODULAR GOITER: ICD-10-CM

## 2022-06-16 DIAGNOSIS — E03.8 HYPOTHYROIDISM DUE TO FIBROUS INVASIVE THYROIDITIS: Primary | ICD-10-CM

## 2022-06-16 DIAGNOSIS — E06.5 HYPOTHYROIDISM DUE TO FIBROUS INVASIVE THYROIDITIS: Primary | ICD-10-CM

## 2022-06-16 LAB
T4 FREE SERPL-MCNC: 1.12 NG/DL (ref 0.71–1.51)
TSH SERPL DL<=0.005 MIU/L-ACNC: 0.28 UIU/ML (ref 0.34–5.6)

## 2022-06-16 PROCEDURE — 36415 COLL VENOUS BLD VENIPUNCTURE: CPT | Performed by: INTERNAL MEDICINE

## 2022-06-16 PROCEDURE — 84443 ASSAY THYROID STIM HORMONE: CPT | Performed by: INTERNAL MEDICINE

## 2022-06-16 PROCEDURE — 84439 ASSAY OF FREE THYROXINE: CPT | Performed by: INTERNAL MEDICINE

## 2022-07-07 DIAGNOSIS — G44.209 TENSION-TYPE HEADACHE, NOT INTRACTABLE, UNSPECIFIED CHRONICITY PATTERN: ICD-10-CM

## 2022-07-07 DIAGNOSIS — G43.009 MIGRAINE WITHOUT AURA AND WITHOUT STATUS MIGRAINOSUS, NOT INTRACTABLE: ICD-10-CM

## 2022-07-07 RX ORDER — BUTALBITAL, ASPIRIN, CAFFEINE AND CODEINE PHOSPHATE 50; 325; 40; 30 MG/1; MG/1; MG/1; MG/1
1 CAPSULE ORAL EVERY 6 HOURS PRN
Qty: 30 CAPSULE | Refills: 0 | Status: SHIPPED | OUTPATIENT
Start: 2022-07-07 | End: 2022-08-05 | Stop reason: SDUPTHER

## 2022-07-07 NOTE — TELEPHONE ENCOUNTER
----- Message from Monik Jaramillo sent at 7/7/2022  3:32 PM CDT -----  Vm- pt needs refill on butalbital   [phone]

## 2022-07-27 ENCOUNTER — OFFICE VISIT (OUTPATIENT)
Dept: FAMILY MEDICINE | Facility: CLINIC | Age: 67
End: 2022-07-27
Payer: MEDICARE

## 2022-07-27 ENCOUNTER — TELEPHONE (OUTPATIENT)
Dept: FAMILY MEDICINE | Facility: CLINIC | Age: 67
End: 2022-07-27

## 2022-07-27 VITALS
DIASTOLIC BLOOD PRESSURE: 78 MMHG | WEIGHT: 149 LBS | BODY MASS INDEX: 24.83 KG/M2 | HEART RATE: 76 BPM | HEIGHT: 65 IN | SYSTOLIC BLOOD PRESSURE: 128 MMHG

## 2022-07-27 DIAGNOSIS — F33.1 MODERATE EPISODE OF RECURRENT MAJOR DEPRESSIVE DISORDER: ICD-10-CM

## 2022-07-27 DIAGNOSIS — Z79.899 HIGH RISK MEDICATION USE: ICD-10-CM

## 2022-07-27 DIAGNOSIS — G43.009 MIGRAINE WITHOUT AURA AND WITHOUT STATUS MIGRAINOSUS, NOT INTRACTABLE: ICD-10-CM

## 2022-07-27 DIAGNOSIS — M62.838 NECK MUSCLE SPASM: Primary | ICD-10-CM

## 2022-07-27 DIAGNOSIS — F41.9 ANXIETY: ICD-10-CM

## 2022-07-27 PROCEDURE — 1159F MED LIST DOCD IN RCRD: CPT | Mod: CPTII,S$GLB,, | Performed by: NURSE PRACTITIONER

## 2022-07-27 PROCEDURE — 3078F DIAST BP <80 MM HG: CPT | Mod: CPTII,S$GLB,, | Performed by: NURSE PRACTITIONER

## 2022-07-27 PROCEDURE — 3074F PR MOST RECENT SYSTOLIC BLOOD PRESSURE < 130 MM HG: ICD-10-PCS | Mod: CPTII,S$GLB,, | Performed by: NURSE PRACTITIONER

## 2022-07-27 PROCEDURE — 1160F RVW MEDS BY RX/DR IN RCRD: CPT | Mod: CPTII,S$GLB,, | Performed by: NURSE PRACTITIONER

## 2022-07-27 PROCEDURE — 96372 THER/PROPH/DIAG INJ SC/IM: CPT | Mod: S$GLB,,, | Performed by: NURSE PRACTITIONER

## 2022-07-27 PROCEDURE — 3008F PR BODY MASS INDEX (BMI) DOCUMENTED: ICD-10-PCS | Mod: CPTII,S$GLB,, | Performed by: NURSE PRACTITIONER

## 2022-07-27 PROCEDURE — 3008F BODY MASS INDEX DOCD: CPT | Mod: CPTII,S$GLB,, | Performed by: NURSE PRACTITIONER

## 2022-07-27 PROCEDURE — 1159F PR MEDICATION LIST DOCUMENTED IN MEDICAL RECORD: ICD-10-PCS | Mod: CPTII,S$GLB,, | Performed by: NURSE PRACTITIONER

## 2022-07-27 PROCEDURE — 1101F PT FALLS ASSESS-DOCD LE1/YR: CPT | Mod: CPTII,S$GLB,, | Performed by: NURSE PRACTITIONER

## 2022-07-27 PROCEDURE — 99214 OFFICE O/P EST MOD 30 MIN: CPT | Mod: 25,S$GLB,, | Performed by: NURSE PRACTITIONER

## 2022-07-27 PROCEDURE — 3288F FALL RISK ASSESSMENT DOCD: CPT | Mod: CPTII,S$GLB,, | Performed by: NURSE PRACTITIONER

## 2022-07-27 PROCEDURE — 96372 PR INJECTION,THERAP/PROPH/DIAG2ST, IM OR SUBCUT: ICD-10-PCS | Mod: S$GLB,,, | Performed by: NURSE PRACTITIONER

## 2022-07-27 PROCEDURE — 3078F PR MOST RECENT DIASTOLIC BLOOD PRESSURE < 80 MM HG: ICD-10-PCS | Mod: CPTII,S$GLB,, | Performed by: NURSE PRACTITIONER

## 2022-07-27 PROCEDURE — 99214 PR OFFICE/OUTPT VISIT, EST, LEVL IV, 30-39 MIN: ICD-10-PCS | Mod: 25,S$GLB,, | Performed by: NURSE PRACTITIONER

## 2022-07-27 PROCEDURE — 3288F PR FALLS RISK ASSESSMENT DOCUMENTED: ICD-10-PCS | Mod: CPTII,S$GLB,, | Performed by: NURSE PRACTITIONER

## 2022-07-27 PROCEDURE — 1101F PR PT FALLS ASSESS DOC 0-1 FALLS W/OUT INJ PAST YR: ICD-10-PCS | Mod: CPTII,S$GLB,, | Performed by: NURSE PRACTITIONER

## 2022-07-27 PROCEDURE — 3074F SYST BP LT 130 MM HG: CPT | Mod: CPTII,S$GLB,, | Performed by: NURSE PRACTITIONER

## 2022-07-27 PROCEDURE — 1160F PR REVIEW ALL MEDS BY PRESCRIBER/CLIN PHARMACIST DOCUMENTED: ICD-10-PCS | Mod: CPTII,S$GLB,, | Performed by: NURSE PRACTITIONER

## 2022-07-27 RX ORDER — METHYLPREDNISOLONE 4 MG/1
TABLET ORAL
Qty: 21 EACH | Refills: 0 | Status: SHIPPED | OUTPATIENT
Start: 2022-07-27 | End: 2022-08-17

## 2022-07-27 RX ORDER — TIZANIDINE 4 MG/1
4 TABLET ORAL EVERY 8 HOURS
Qty: 30 TABLET | Refills: 0 | Status: SHIPPED | OUTPATIENT
Start: 2022-07-27 | End: 2022-08-06

## 2022-07-27 RX ORDER — LEVOTHYROXINE SODIUM 100 UG/1
100 TABLET ORAL DAILY
COMMUNITY
Start: 2022-06-23

## 2022-07-27 RX ORDER — KETOROLAC TROMETHAMINE 30 MG/ML
30 INJECTION, SOLUTION INTRAMUSCULAR; INTRAVENOUS
Status: COMPLETED | OUTPATIENT
Start: 2022-07-27 | End: 2022-07-27

## 2022-07-27 RX ADMIN — KETOROLAC TROMETHAMINE 30 MG: 30 INJECTION, SOLUTION INTRAMUSCULAR; INTRAVENOUS at 01:07

## 2022-07-27 NOTE — TELEPHONE ENCOUNTER
----- Message from Spencer Turk MA sent at 7/27/2022 10:18 AM CDT -----  Pt has been fighting neck spasms since Saturday. # [phone]

## 2022-08-05 DIAGNOSIS — G43.009 MIGRAINE WITHOUT AURA AND WITHOUT STATUS MIGRAINOSUS, NOT INTRACTABLE: ICD-10-CM

## 2022-08-05 DIAGNOSIS — G44.209 TENSION-TYPE HEADACHE, NOT INTRACTABLE, UNSPECIFIED CHRONICITY PATTERN: ICD-10-CM

## 2022-08-05 NOTE — PROGRESS NOTES
SUBJECTIVE:    Patient ID: Eulalia Bermudez is a 67 y.o. female.    Chief Complaint: Spasms (Did not bring bottles//pt here today for neck spasms x 5 days//pt will take PNA//bs)    67 year old female presents for urgent visit.treated for migraines, depression, anxiety, hypothyroid. Today she is complaining about worsening neck pain. Unable to turn neck. Denies injury or trauma. Reports that it does not seem to be getting better despite has tried otc meds with no improvement.       Lab Visit on 2022   Component Date Value Ref Range Status    Free T4 2022 1.12  0.71 - 1.51 ng/dL Final    TSH 2022 0.280 (A) 0.340 - 5.600 uIU/mL Final       Past Medical History:   Diagnosis Date    Depression     Hypertension     Hypothyroidism     Kidney stones      Social History     Socioeconomic History    Marital status:    Tobacco Use    Smoking status: Never Smoker    Smokeless tobacco: Never Used   Substance and Sexual Activity    Alcohol use: Yes    Drug use: No    Sexual activity: Yes     Partners: Male     Past Surgical History:   Procedure Laterality Date     SECTION       Family History   Problem Relation Age of Onset    Cancer Mother     COPD Mother     Heart disease Mother     Hypertension Mother     Depression Mother     Hearing loss Mother     Emphysema Father     Breast cancer Maternal Aunt     Breast cancer Paternal Aunt        Review of patient's allergies indicates:   Allergen Reactions    Imitrex [sumatriptan] Anaphylaxis    Egg Nausea Only    Penicillins Rash    Sulfa (sulfonamide antibiotics) Rash       Current Outpatient Medications:     amLODIPine (NORVASC) 5 MG tablet, Take 1 tablet (5 mg total) by mouth once daily., Disp: 90 tablet, Rfl: 1    buPROPion (WELLBUTRIN XL) 300 MG 24 hr tablet, Take 1 tablet (300 mg total) by mouth once daily., Disp: 90 tablet, Rfl: 1    cetirizine (ZYRTEC) 10 MG tablet, Take 10 mg by mouth once daily., Disp: , Rfl:      codeine-butalbital-ASA-caffeine (BUTALBITAL COMPOUND-CODEINE) 37--40 mg Cap, Take 1 capsule by mouth every 6 (six) hours as needed (migraines)., Disp: 30 capsule, Rfl: 0    diclofenac sodium (VOLTAREN) 1 % Gel, Apply topically 2 (two) times daily as needed., Disp: 100 g, Rfl: 0    EScitalopram oxalate (LEXAPRO) 20 MG tablet, Take 1 tablet (20 mg total) by mouth once daily., Disp: 90 tablet, Rfl: 1    folic acid (FOLVITE) 400 MCG tablet, Take 400 mcg by mouth once daily., Disp: , Rfl:     gabapentin (NEURONTIN) 300 MG capsule, Take 1 capsule (300 mg total) by mouth 2 (two) times daily., Disp: 180 capsule, Rfl: 1    methylPREDNISolone (MEDROL DOSEPACK) 4 mg tablet, use as directed, Disp: 21 each, Rfl: 0    metoprolol tartrate (LOPRESSOR) 50 MG tablet, Take 1 tablet (50 mg total) by mouth once daily., Disp: 90 tablet, Rfl: 1    mupirocin (BACTROBAN) 2 % ointment, Apply topically 3 (three) times daily., Disp: 22 g, Rfl: 0    naproxen (NAPROSYN) 500 MG tablet, Take 1 tablet (500 mg total) by mouth 2 (two) times daily with meals., Disp: 30 tablet, Rfl: 0    promethazine (PHENERGAN) 25 MG tablet, Take 1 tablet (25 mg total) by mouth every 6 (six) hours as needed for Nausea., Disp: 10 tablet, Rfl: 0    tamsulosin (FLOMAX) 0.4 mg Cap, Take 1 capsule (0.4 mg total) by mouth once daily., Disp: 30 capsule, Rfl: 11    tiZANidine (ZANAFLEX) 4 MG tablet, Take 1 tablet (4 mg total) by mouth every 8 (eight) hours. for 10 days, Disp: 30 tablet, Rfl: 0    topiramate (TOPAMAX) 25 MG tablet, Take 1 tablet (25 mg total) by mouth 2 (two) times daily., Disp: 60 tablet, Rfl: 11    triamcinolone (NASACORT) 55 mcg nasal inhaler, 2 sprays by Nasal route once daily., Disp: 1 g, Rfl: 6    UNITHROID 100 mcg tablet, Take 100 mcg by mouth once daily., Disp: , Rfl:     UNITHROID 112 mcg tablet, Take 112 mcg by mouth once daily., Disp: , Rfl:     vitamin D (VITAMIN D3) 1000 units Tab, Take 1,000 Units by mouth once daily., Disp:  ", Rfl:     Review of Systems   Constitutional: Negative for chills, fever and unexpected weight change.   HENT: Negative for ear pain, rhinorrhea and sore throat.    Eyes: Negative for pain and visual disturbance.   Respiratory: Negative for cough and shortness of breath.    Cardiovascular: Negative for chest pain, palpitations and leg swelling.   Gastrointestinal: Negative for abdominal pain, diarrhea, nausea and vomiting.   Genitourinary: Negative for difficulty urinating, hematuria and vaginal bleeding.   Musculoskeletal: Positive for neck pain and neck stiffness. Negative for arthralgias.   Skin: Negative for rash.   Neurological: Negative for dizziness, weakness and headaches.   Psychiatric/Behavioral: Negative for agitation and sleep disturbance. The patient is not nervous/anxious.           Objective:      Vitals:    07/27/22 1240   BP: 128/78   Pulse: 76   Weight: 67.6 kg (149 lb)   Height: 5' 5" (1.651 m)     Physical Exam  Vitals and nursing note reviewed.   Constitutional:       Appearance: She is well-developed.   Neck:      Vascular: No JVD.   Cardiovascular:      Rate and Rhythm: Normal rate and regular rhythm.      Heart sounds: No murmur heard.  Pulmonary:      Effort: Pulmonary effort is normal.      Breath sounds: Normal breath sounds.   Abdominal:      General: Bowel sounds are normal.      Palpations: Abdomen is soft.   Musculoskeletal:         General: No deformity.      Cervical back: Neck supple. Spasms and tenderness present. No crepitus. Decreased range of motion.   Lymphadenopathy:      Cervical: No cervical adenopathy.   Skin:     General: Skin is warm and dry.      Findings: No rash.   Neurological:      Mental Status: She is alert and oriented to person, place, and time.      Gait: Gait normal.   Psychiatric:         Speech: Speech normal.         Behavior: Behavior normal.           Assessment:       1. Neck muscle spasm    2. Moderate episode of recurrent major depressive disorder  "   3. Migraine without aura and without status migrainosus, not intractable    4. Anxiety    5. High risk medication use         Plan:       Neck muscle spasm  -     ketorolac injection 30 mg  -     tiZANidine (ZANAFLEX) 4 MG tablet; Take 1 tablet (4 mg total) by mouth every 8 (eight) hours. for 10 days  Dispense: 30 tablet; Refill: 0  -     methylPREDNISolone (MEDROL DOSEPACK) 4 mg tablet; use as directed  Dispense: 21 each; Refill: 0    Moderate episode of recurrent major depressive disorder    Migraine without aura and without status migrainosus, not intractable    Anxiety    High risk medication use      Follow up in about 3 months (around 10/27/2022), or if symptoms worsen or fail to improve, for medication management.        8/5/2022 Lizy Noble

## 2022-08-05 NOTE — TELEPHONE ENCOUNTER
----- Message from Monik Jaramillo sent at 8/5/2022 11:49 AM CDT -----  Vm- pt needs refill on butalbital with [facility]     [phone]

## 2022-08-06 RX ORDER — BUTALBITAL, ASPIRIN, CAFFEINE AND CODEINE PHOSPHATE 50; 325; 40; 30 MG/1; MG/1; MG/1; MG/1
1 CAPSULE ORAL EVERY 6 HOURS PRN
Qty: 30 CAPSULE | Refills: 0 | Status: SHIPPED | OUTPATIENT
Start: 2022-08-06 | End: 2022-09-06 | Stop reason: SDUPTHER

## 2022-09-01 ENCOUNTER — TELEPHONE (OUTPATIENT)
Dept: FAMILY MEDICINE | Facility: CLINIC | Age: 67
End: 2022-09-01

## 2022-09-06 DIAGNOSIS — G43.009 MIGRAINE WITHOUT AURA AND WITHOUT STATUS MIGRAINOSUS, NOT INTRACTABLE: ICD-10-CM

## 2022-09-06 DIAGNOSIS — G44.209 TENSION-TYPE HEADACHE, NOT INTRACTABLE, UNSPECIFIED CHRONICITY PATTERN: ICD-10-CM

## 2022-09-06 RX ORDER — BUTALBITAL, ASPIRIN, CAFFEINE AND CODEINE PHOSPHATE 50; 325; 40; 30 MG/1; MG/1; MG/1; MG/1
1 CAPSULE ORAL EVERY 6 HOURS PRN
Qty: 30 CAPSULE | Refills: 0 | Status: SHIPPED | OUTPATIENT
Start: 2022-09-06 | End: 2022-09-19 | Stop reason: SDUPTHER

## 2022-09-06 NOTE — TELEPHONE ENCOUNTER
----- Message from Monik Jaramillo sent at 9/6/2022 11:42 AM CDT -----  - pt needs refill on [facility]   [phone]

## 2022-09-19 ENCOUNTER — OFFICE VISIT (OUTPATIENT)
Dept: FAMILY MEDICINE | Facility: CLINIC | Age: 67
End: 2022-09-19
Payer: MEDICARE

## 2022-09-19 VITALS
HEIGHT: 65 IN | WEIGHT: 153 LBS | DIASTOLIC BLOOD PRESSURE: 82 MMHG | SYSTOLIC BLOOD PRESSURE: 132 MMHG | HEART RATE: 64 BPM | BODY MASS INDEX: 25.49 KG/M2

## 2022-09-19 DIAGNOSIS — G44.209 TENSION-TYPE HEADACHE, NOT INTRACTABLE, UNSPECIFIED CHRONICITY PATTERN: ICD-10-CM

## 2022-09-19 DIAGNOSIS — J30.1 SEASONAL ALLERGIC RHINITIS DUE TO POLLEN: ICD-10-CM

## 2022-09-19 DIAGNOSIS — Z51.81 ENCOUNTER FOR THERAPEUTIC DRUG MONITORING: ICD-10-CM

## 2022-09-19 DIAGNOSIS — L21.9 SEBORRHEA: ICD-10-CM

## 2022-09-19 DIAGNOSIS — Z23 NEED FOR PNEUMOCOCCAL VACCINATION: ICD-10-CM

## 2022-09-19 DIAGNOSIS — Z23 NEED FOR INFLUENZA VACCINATION: ICD-10-CM

## 2022-09-19 DIAGNOSIS — G62.9 NEUROPATHY: ICD-10-CM

## 2022-09-19 DIAGNOSIS — Z79.899 HIGH RISK MEDICATION USE: Primary | ICD-10-CM

## 2022-09-19 DIAGNOSIS — F32.1 MODERATE MAJOR DEPRESSION: ICD-10-CM

## 2022-09-19 DIAGNOSIS — I10 HYPERTENSION, ESSENTIAL: ICD-10-CM

## 2022-09-19 DIAGNOSIS — F41.9 ANXIETY: ICD-10-CM

## 2022-09-19 DIAGNOSIS — E55.9 VITAMIN D DEFICIENCY: ICD-10-CM

## 2022-09-19 DIAGNOSIS — G43.009 MIGRAINE WITHOUT AURA AND WITHOUT STATUS MIGRAINOSUS, NOT INTRACTABLE: ICD-10-CM

## 2022-09-19 DIAGNOSIS — F33.1 MODERATE EPISODE OF RECURRENT MAJOR DEPRESSIVE DISORDER: ICD-10-CM

## 2022-09-19 DIAGNOSIS — R07.81 RIB PAIN: ICD-10-CM

## 2022-09-19 PROCEDURE — G0008 FLU VACCINE - QUADRIVALENT (RECOMBINANT) PRESERVATIVE FREE: ICD-10-PCS | Mod: S$GLB,,, | Performed by: NURSE PRACTITIONER

## 2022-09-19 PROCEDURE — 1159F PR MEDICATION LIST DOCUMENTED IN MEDICAL RECORD: ICD-10-PCS | Mod: CPTII,S$GLB,, | Performed by: NURSE PRACTITIONER

## 2022-09-19 PROCEDURE — 99214 PR OFFICE/OUTPT VISIT, EST, LEVL IV, 30-39 MIN: ICD-10-PCS | Mod: 25,S$GLB,, | Performed by: NURSE PRACTITIONER

## 2022-09-19 PROCEDURE — 3079F DIAST BP 80-89 MM HG: CPT | Mod: CPTII,S$GLB,, | Performed by: NURSE PRACTITIONER

## 2022-09-19 PROCEDURE — 90677 PNEUMOCOCCAL CONJUGATE VACCINE 20-VALENT: ICD-10-PCS | Mod: S$GLB,,, | Performed by: NURSE PRACTITIONER

## 2022-09-19 PROCEDURE — 3075F SYST BP GE 130 - 139MM HG: CPT | Mod: CPTII,S$GLB,, | Performed by: NURSE PRACTITIONER

## 2022-09-19 PROCEDURE — G0008 ADMIN INFLUENZA VIRUS VAC: HCPCS | Mod: S$GLB,,, | Performed by: NURSE PRACTITIONER

## 2022-09-19 PROCEDURE — 1159F MED LIST DOCD IN RCRD: CPT | Mod: CPTII,S$GLB,, | Performed by: NURSE PRACTITIONER

## 2022-09-19 PROCEDURE — 3008F PR BODY MASS INDEX (BMI) DOCUMENTED: ICD-10-PCS | Mod: CPTII,S$GLB,, | Performed by: NURSE PRACTITIONER

## 2022-09-19 PROCEDURE — 90677 PCV20 VACCINE IM: CPT | Mod: S$GLB,,, | Performed by: NURSE PRACTITIONER

## 2022-09-19 PROCEDURE — 99214 OFFICE O/P EST MOD 30 MIN: CPT | Mod: 25,S$GLB,, | Performed by: NURSE PRACTITIONER

## 2022-09-19 PROCEDURE — G0009 ADMIN PNEUMOCOCCAL VACCINE: HCPCS | Mod: S$GLB,,, | Performed by: NURSE PRACTITIONER

## 2022-09-19 PROCEDURE — 90682 RIV4 VACC RECOMBINANT DNA IM: CPT | Mod: S$GLB,,, | Performed by: NURSE PRACTITIONER

## 2022-09-19 PROCEDURE — 3008F BODY MASS INDEX DOCD: CPT | Mod: CPTII,S$GLB,, | Performed by: NURSE PRACTITIONER

## 2022-09-19 PROCEDURE — G0009 PNEUMOCOCCAL CONJUGATE VACCINE 20-VALENT: ICD-10-PCS | Mod: S$GLB,,, | Performed by: NURSE PRACTITIONER

## 2022-09-19 PROCEDURE — 90682 FLU VACCINE - QUADRIVALENT (RECOMBINANT) PRESERVATIVE FREE: ICD-10-PCS | Mod: S$GLB,,, | Performed by: NURSE PRACTITIONER

## 2022-09-19 PROCEDURE — 3075F PR MOST RECENT SYSTOLIC BLOOD PRESS GE 130-139MM HG: ICD-10-PCS | Mod: CPTII,S$GLB,, | Performed by: NURSE PRACTITIONER

## 2022-09-19 PROCEDURE — 3079F PR MOST RECENT DIASTOLIC BLOOD PRESSURE 80-89 MM HG: ICD-10-PCS | Mod: CPTII,S$GLB,, | Performed by: NURSE PRACTITIONER

## 2022-09-19 RX ORDER — METOPROLOL TARTRATE 50 MG/1
50 TABLET ORAL DAILY
Qty: 90 TABLET | Refills: 1 | Status: SHIPPED | OUTPATIENT
Start: 2022-09-19 | End: 2023-03-13 | Stop reason: SDUPTHER

## 2022-09-19 RX ORDER — AMLODIPINE BESYLATE 5 MG/1
5 TABLET ORAL DAILY
Qty: 90 TABLET | Refills: 1 | Status: SHIPPED | OUTPATIENT
Start: 2022-09-19 | End: 2023-03-13 | Stop reason: SDUPTHER

## 2022-09-19 RX ORDER — BUPROPION HYDROCHLORIDE 300 MG/1
300 TABLET ORAL DAILY
Qty: 90 TABLET | Refills: 1 | Status: SHIPPED | OUTPATIENT
Start: 2022-09-19 | End: 2023-03-13 | Stop reason: SDUPTHER

## 2022-09-19 RX ORDER — BUTALBITAL, ASPIRIN, CAFFEINE AND CODEINE PHOSPHATE 50; 325; 40; 30 MG/1; MG/1; MG/1; MG/1
1 CAPSULE ORAL EVERY 6 HOURS PRN
Qty: 30 CAPSULE | Refills: 0 | Status: SHIPPED | OUTPATIENT
Start: 2022-10-06 | End: 2022-11-03 | Stop reason: SDUPTHER

## 2022-09-19 RX ORDER — KETOCONAZOLE 20 MG/G
CREAM TOPICAL DAILY
Qty: 60 G | Refills: 0 | Status: SHIPPED | OUTPATIENT
Start: 2022-09-19

## 2022-09-19 RX ORDER — GABAPENTIN 300 MG/1
300 CAPSULE ORAL 2 TIMES DAILY
Qty: 180 CAPSULE | Refills: 1 | Status: SHIPPED | OUTPATIENT
Start: 2022-09-19

## 2022-09-19 RX ORDER — ESCITALOPRAM OXALATE 20 MG/1
20 TABLET ORAL DAILY
Qty: 90 TABLET | Refills: 1 | Status: SHIPPED | OUTPATIENT
Start: 2022-09-19 | End: 2023-03-13 | Stop reason: SDUPTHER

## 2022-09-20 ENCOUNTER — LAB VISIT (OUTPATIENT)
Dept: LAB | Facility: HOSPITAL | Age: 67
End: 2022-09-20
Attending: NURSE PRACTITIONER
Payer: MEDICARE

## 2022-09-20 DIAGNOSIS — F32.1 MODERATE MAJOR DEPRESSION: ICD-10-CM

## 2022-09-20 DIAGNOSIS — Z79.899 HIGH RISK MEDICATION USE: ICD-10-CM

## 2022-09-20 DIAGNOSIS — I10 HYPERTENSION, ESSENTIAL: ICD-10-CM

## 2022-09-20 LAB
ALBUMIN SERPL BCP-MCNC: 3.5 G/DL (ref 3.5–5.2)
ALBUMIN/CREAT UR: 117.9 UG/MG (ref 0–30)
ALP SERPL-CCNC: 81 U/L (ref 55–135)
ALT SERPL W/O P-5'-P-CCNC: 24 U/L (ref 10–44)
ANION GAP SERPL CALC-SCNC: 5 MMOL/L (ref 8–16)
AST SERPL-CCNC: 23 U/L (ref 10–40)
BACTERIA #/AREA URNS HPF: NEGATIVE /HPF
BASOPHILS # BLD AUTO: 0.05 K/UL (ref 0–0.2)
BASOPHILS NFR BLD: 1 % (ref 0–1.9)
BILIRUB SERPL-MCNC: 0.5 MG/DL (ref 0.1–1)
BILIRUB UR QL STRIP: NEGATIVE
BUN SERPL-MCNC: 20 MG/DL (ref 8–23)
CALCIUM SERPL-MCNC: 8.7 MG/DL (ref 8.7–10.5)
CHLORIDE SERPL-SCNC: 107 MMOL/L (ref 95–110)
CHOLEST SERPL-MCNC: 238 MG/DL (ref 120–199)
CHOLEST/HDLC SERPL: 3.5 {RATIO} (ref 2–5)
CLARITY UR: CLEAR
CO2 SERPL-SCNC: 29 MMOL/L (ref 23–29)
COLOR UR: YELLOW
CREAT SERPL-MCNC: 1.1 MG/DL (ref 0.5–1.4)
CREAT UR-MCNC: 170 MG/DL (ref 15–325)
DIFFERENTIAL METHOD: ABNORMAL
EOSINOPHIL # BLD AUTO: 0.2 K/UL (ref 0–0.5)
EOSINOPHIL NFR BLD: 4 % (ref 0–8)
ERYTHROCYTE [DISTWIDTH] IN BLOOD BY AUTOMATED COUNT: 12.5 % (ref 11.5–14.5)
EST. GFR  (NO RACE VARIABLE): 55.1 ML/MIN/1.73 M^2
GLUCOSE SERPL-MCNC: 88 MG/DL (ref 70–110)
GLUCOSE UR QL STRIP: NEGATIVE
HCT VFR BLD AUTO: 41.6 % (ref 37–48.5)
HDLC SERPL-MCNC: 68 MG/DL (ref 40–75)
HDLC SERPL: 28.6 % (ref 20–50)
HGB BLD-MCNC: 13.5 G/DL (ref 12–16)
HGB UR QL STRIP: ABNORMAL
HYALINE CASTS #/AREA URNS LPF: 4 /LPF
IMM GRANULOCYTES # BLD AUTO: 0.02 K/UL (ref 0–0.04)
IMM GRANULOCYTES NFR BLD AUTO: 0.4 % (ref 0–0.5)
KETONES UR QL STRIP: NEGATIVE
LDLC SERPL CALC-MCNC: 151 MG/DL (ref 63–159)
LEUKOCYTE ESTERASE UR QL STRIP: ABNORMAL
LYMPHOCYTES # BLD AUTO: 1.5 K/UL (ref 1–4.8)
LYMPHOCYTES NFR BLD: 29.6 % (ref 18–48)
MCH RBC QN AUTO: 33.6 PG (ref 27–31)
MCHC RBC AUTO-ENTMCNC: 32.5 G/DL (ref 32–36)
MCV RBC AUTO: 104 FL (ref 82–98)
MICROALBUMIN UR DL<=1MG/L-MCNC: 200.4 UG/ML
MICROSCOPIC COMMENT: ABNORMAL
MONOCYTES # BLD AUTO: 0.6 K/UL (ref 0.3–1)
MONOCYTES NFR BLD: 11.5 % (ref 4–15)
NEUTROPHILS # BLD AUTO: 2.8 K/UL (ref 1.8–7.7)
NEUTROPHILS NFR BLD: 53.5 % (ref 38–73)
NITRITE UR QL STRIP: NEGATIVE
NONHDLC SERPL-MCNC: 170 MG/DL
NRBC BLD-RTO: 0 /100 WBC
PH UR STRIP: 6 [PH] (ref 5–8)
PLATELET # BLD AUTO: 230 K/UL (ref 150–450)
PMV BLD AUTO: 10.3 FL (ref 9.2–12.9)
POTASSIUM SERPL-SCNC: 4.1 MMOL/L (ref 3.5–5.1)
PROT SERPL-MCNC: 6.2 G/DL (ref 6–8.4)
PROT UR QL STRIP: ABNORMAL
RBC # BLD AUTO: 4.02 M/UL (ref 4–5.4)
RBC #/AREA URNS HPF: 80 /HPF (ref 0–4)
SODIUM SERPL-SCNC: 141 MMOL/L (ref 136–145)
SP GR UR STRIP: 1.02 (ref 1–1.03)
SQUAMOUS #/AREA URNS HPF: 1 /HPF
TRIGL SERPL-MCNC: 95 MG/DL (ref 30–150)
TSH SERPL DL<=0.005 MIU/L-ACNC: 3.52 UIU/ML (ref 0.34–5.6)
URN SPEC COLLECT METH UR: ABNORMAL
UROBILINOGEN UR STRIP-ACNC: NEGATIVE EU/DL
VIT B12 SERPL-MCNC: 234 PG/ML (ref 210–950)
WBC # BLD AUTO: 5.2 K/UL (ref 3.9–12.7)
WBC #/AREA URNS HPF: 12 /HPF (ref 0–5)

## 2022-09-20 PROCEDURE — 82607 VITAMIN B-12: CPT | Performed by: NURSE PRACTITIONER

## 2022-09-20 PROCEDURE — 36415 COLL VENOUS BLD VENIPUNCTURE: CPT | Performed by: NURSE PRACTITIONER

## 2022-09-20 PROCEDURE — 82043 UR ALBUMIN QUANTITATIVE: CPT | Performed by: NURSE PRACTITIONER

## 2022-09-20 PROCEDURE — 81001 URINALYSIS AUTO W/SCOPE: CPT | Performed by: NURSE PRACTITIONER

## 2022-09-20 PROCEDURE — 85025 COMPLETE CBC W/AUTO DIFF WBC: CPT | Performed by: NURSE PRACTITIONER

## 2022-09-20 PROCEDURE — 80061 LIPID PANEL: CPT | Performed by: NURSE PRACTITIONER

## 2022-09-20 PROCEDURE — 87086 URINE CULTURE/COLONY COUNT: CPT | Performed by: NURSE PRACTITIONER

## 2022-09-20 PROCEDURE — 84443 ASSAY THYROID STIM HORMONE: CPT | Performed by: NURSE PRACTITIONER

## 2022-09-20 PROCEDURE — 80053 COMPREHEN METABOLIC PANEL: CPT | Performed by: NURSE PRACTITIONER

## 2022-09-20 PROCEDURE — 82570 ASSAY OF URINE CREATININE: CPT | Performed by: NURSE PRACTITIONER

## 2022-09-20 NOTE — PROGRESS NOTES
SUBJECTIVE:    Patient ID: Eulalia Bermudez is a 67 y.o. female.    Chief Complaint: Follow-up (6mth, brought bottles, PNA )    67 year old female presents for check up.treated for migraines, depression, anxiety, hypothyroid. taking both wellbutrin and lexapro.feels like stress/anxiety is well controlled. Does feel tired most days. Followed by dr. Spencer . Recent labs showed tsh 0.28. sleeps ok at nights.    Migraines have lessoned..' fioricet 30 per month.  Sleeps ok most nights.   bp is well controlled in office today. Does not check routinely at home. Due for labs.       Lab Visit on 2022   Component Date Value Ref Range Status    Free T4 2022 1.12  0.71 - 1.51 ng/dL Final    TSH 2022 0.280 (L)  0.340 - 5.600 uIU/mL Final       Past Medical History:   Diagnosis Date    Depression     Hypertension     Hypothyroidism     Kidney stones      Social History     Socioeconomic History    Marital status:    Tobacco Use    Smoking status: Never    Smokeless tobacco: Never   Substance and Sexual Activity    Alcohol use: Yes    Drug use: No    Sexual activity: Yes     Partners: Male     Past Surgical History:   Procedure Laterality Date     SECTION       Family History   Problem Relation Age of Onset    Cancer Mother     COPD Mother     Heart disease Mother     Hypertension Mother     Depression Mother     Hearing loss Mother     Emphysema Father     Breast cancer Maternal Aunt     Breast cancer Paternal Aunt        Review of patient's allergies indicates:   Allergen Reactions    Imitrex [sumatriptan] Anaphylaxis    Methylprednisolone Other (See Comments)     Flush and hot sweats    Egg Nausea Only    Penicillins Rash    Sulfa (sulfonamide antibiotics) Rash       Current Outpatient Medications:     cetirizine (ZYRTEC) 10 MG tablet, Take 10 mg by mouth once daily., Disp: , Rfl:     diclofenac sodium (VOLTAREN) 1 % Gel, Apply topically 2 (two) times daily as needed., Disp: 100 g, Rfl:  0    folic acid (FOLVITE) 400 MCG tablet, Take 400 mcg by mouth once daily., Disp: , Rfl:     mupirocin (BACTROBAN) 2 % ointment, Apply topically 3 (three) times daily., Disp: 22 g, Rfl: 0    naproxen (NAPROSYN) 500 MG tablet, Take 1 tablet (500 mg total) by mouth 2 (two) times daily with meals., Disp: 30 tablet, Rfl: 0    promethazine (PHENERGAN) 25 MG tablet, Take 1 tablet (25 mg total) by mouth every 6 (six) hours as needed for Nausea., Disp: 10 tablet, Rfl: 0    tamsulosin (FLOMAX) 0.4 mg Cap, Take 1 capsule (0.4 mg total) by mouth once daily., Disp: 30 capsule, Rfl: 11    topiramate (TOPAMAX) 25 MG tablet, Take 1 tablet (25 mg total) by mouth 2 (two) times daily., Disp: 60 tablet, Rfl: 11    triamcinolone (NASACORT) 55 mcg nasal inhaler, 2 sprays by Nasal route once daily., Disp: 1 g, Rfl: 6    UNITHROID 100 mcg tablet, Take 100 mcg by mouth once daily., Disp: , Rfl:     UNITHROID 112 mcg tablet, Take 112 mcg by mouth once daily., Disp: , Rfl:     vitamin D (VITAMIN D3) 1000 units Tab, Take 1,000 Units by mouth once daily., Disp: , Rfl:     amLODIPine (NORVASC) 5 MG tablet, Take 1 tablet (5 mg total) by mouth once daily., Disp: 90 tablet, Rfl: 1    buPROPion (WELLBUTRIN XL) 300 MG 24 hr tablet, Take 1 tablet (300 mg total) by mouth once daily., Disp: 90 tablet, Rfl: 1    [START ON 10/6/2022] codeine-butalbital-ASA-caffeine (BUTALBITAL COMPOUND-CODEINE) 12--40 mg Cap, Take 1 capsule by mouth every 6 (six) hours as needed (migraines)., Disp: 30 capsule, Rfl: 0    EScitalopram oxalate (LEXAPRO) 20 MG tablet, Take 1 tablet (20 mg total) by mouth once daily., Disp: 90 tablet, Rfl: 1    gabapentin (NEURONTIN) 300 MG capsule, Take 1 capsule (300 mg total) by mouth 2 (two) times daily., Disp: 180 capsule, Rfl: 1    metoprolol tartrate (LOPRESSOR) 50 MG tablet, Take 1 tablet (50 mg total) by mouth once daily., Disp: 90 tablet, Rfl: 1    Review of Systems   Constitutional:  Negative for chills, fever and unexpected  "weight change.   HENT:  Negative for ear pain, rhinorrhea and sore throat.    Eyes:  Negative for pain and visual disturbance.   Respiratory:  Negative for cough and shortness of breath.    Cardiovascular:  Negative for chest pain, palpitations and leg swelling.   Gastrointestinal:  Negative for abdominal pain, diarrhea, nausea and vomiting.   Genitourinary:  Negative for difficulty urinating, hematuria and vaginal bleeding.   Musculoskeletal:  Negative for arthralgias.   Skin:  Negative for rash.   Neurological:  Negative for dizziness, weakness and headaches.   Psychiatric/Behavioral:  Negative for agitation and sleep disturbance. The patient is not nervous/anxious.         Objective:      Vitals:    09/19/22 1330   BP: 132/82   Pulse: 64   Weight: 69.4 kg (153 lb)   Height: 5' 5" (1.651 m)     Physical Exam  Vitals and nursing note reviewed.   Constitutional:       General: She is not in acute distress.     Appearance: Normal appearance. She is well-developed.   HENT:      Head: Normocephalic.      Right Ear: External ear normal.      Left Ear: External ear normal.      Nose:      Right Turbinates: Not enlarged.      Left Turbinates: Not enlarged.      Mouth/Throat:      Pharynx: No posterior oropharyngeal erythema.   Eyes:      Conjunctiva/sclera: Conjunctivae normal.      Pupils: Pupils are equal, round, and reactive to light.   Neck:      Vascular: No JVD.   Cardiovascular:      Rate and Rhythm: Normal rate and regular rhythm.      Heart sounds: No murmur heard.  Pulmonary:      Effort: Pulmonary effort is normal.      Breath sounds: Normal breath sounds.   Abdominal:      General: Bowel sounds are normal.      Palpations: Abdomen is soft.   Musculoskeletal:         General: No deformity. Normal range of motion.      Cervical back: Normal range of motion and neck supple.   Lymphadenopathy:      Cervical: No cervical adenopathy.   Skin:     General: Skin is warm and dry.      Findings: No rash.             " Comments: Eythema to eyebrows. Some scaling on right   Neurological:      Mental Status: She is alert and oriented to person, place, and time.      Gait: Gait normal.   Psychiatric:         Speech: Speech normal.         Behavior: Behavior normal.         Assessment:       1. High risk medication use    2. Moderate major depression    3. Tension-type headache, not intractable, unspecified chronicity pattern    4. Migraine without aura and without status migrainosus, not intractable    5. Hypertension, essential    6. Encounter for therapeutic drug monitoring    7. Rib pain    8. Need for pneumococcal vaccination    9. Need for influenza vaccination    10. Moderate episode of recurrent major depressive disorder    11. Vitamin D deficiency    12. Seasonal allergic rhinitis due to pollen    13. Anxiety    14. Neuropathy           Plan:       High risk medication use  -     DRUG MONITOR, PANEL 4, W/CONF, URINE; Future; Expected date: 09/19/2022  -     Pain Mgmt, Gabapentin, Quant, Urine; Future; Expected date: 09/19/2022  -     CBC Auto Differential; Future; Expected date: 09/19/2022  -     Comprehensive Metabolic Panel; Future; Expected date: 09/19/2022  -     Lipid Panel; Future; Expected date: 09/19/2022  -     Microalbumin/Creatinine Ratio, Urine; Future; Expected date: 09/19/2022  -     Urinalysis, Reflex to Urine Culture Urine, Clean Catch; Future; Expected date: 09/19/2022  -     Vitamin B12; Future; Expected date: 09/19/2022  -     TSH; Future; Expected date: 09/19/2022    Moderate major depression  Comments:  increase lexapro to 20  Orders:  -     buPROPion (WELLBUTRIN XL) 300 MG 24 hr tablet; Take 1 tablet (300 mg total) by mouth once daily.  Dispense: 90 tablet; Refill: 1  -     EScitalopram oxalate (LEXAPRO) 20 MG tablet; Take 1 tablet (20 mg total) by mouth once daily.  Dispense: 90 tablet; Refill: 1  -     CBC Auto Differential; Future; Expected date: 09/19/2022  -     Comprehensive Metabolic Panel;  Future; Expected date: 09/19/2022  -     Lipid Panel; Future; Expected date: 09/19/2022  -     Microalbumin/Creatinine Ratio, Urine; Future; Expected date: 09/19/2022  -     Urinalysis, Reflex to Urine Culture Urine, Clean Catch; Future; Expected date: 09/19/2022  -     Vitamin B12; Future; Expected date: 09/19/2022    Tension-type headache, not intractable, unspecified chronicity pattern  -     codeine-butalbital-ASA-caffeine (BUTALBITAL COMPOUND-CODEINE) 13--40 mg Cap; Take 1 capsule by mouth every 6 (six) hours as needed (migraines).  Dispense: 30 capsule; Refill: 0    Migraine without aura and without status migrainosus, not intractable  -     codeine-butalbital-ASA-caffeine (BUTALBITAL COMPOUND-CODEINE) 99--40 mg Cap; Take 1 capsule by mouth every 6 (six) hours as needed (migraines).  Dispense: 30 capsule; Refill: 0    Hypertension, essential  -     metoprolol tartrate (LOPRESSOR) 50 MG tablet; Take 1 tablet (50 mg total) by mouth once daily.  Dispense: 90 tablet; Refill: 1  -     amLODIPine (NORVASC) 5 MG tablet; Take 1 tablet (5 mg total) by mouth once daily.  Dispense: 90 tablet; Refill: 1  -     CBC Auto Differential; Future; Expected date: 09/19/2022  -     Comprehensive Metabolic Panel; Future; Expected date: 09/19/2022  -     Lipid Panel; Future; Expected date: 09/19/2022  -     Microalbumin/Creatinine Ratio, Urine; Future; Expected date: 09/19/2022  -     Urinalysis, Reflex to Urine Culture Urine, Clean Catch; Future; Expected date: 09/19/2022  -     Vitamin B12; Future; Expected date: 09/19/2022    Encounter for therapeutic drug monitoring  -     DRUG MONITOR, PANEL 4, W/CONF, URINE; Future; Expected date: 09/19/2022  -     Pain Mgmt, Gabapentin, Quant, Urine; Future; Expected date: 09/19/2022    Rib pain  -     DRUG MONITOR, PANEL 4, W/CONF, URINE; Future; Expected date: 09/19/2022  -     Pain Mgmt, Gabapentin, Quant, Urine; Future; Expected date: 09/19/2022    Need for pneumococcal  vaccination  -     (In Office Administered) Pneumococcal Conjugate Vaccine (20 Valent) (IM)    Need for influenza vaccination  -     Influenza (FLUBLOK) - Quadrivalent (Recombinant) *Preferred* (PF) - egg allergy    Moderate episode of recurrent major depressive disorder    Vitamin D deficiency    Seasonal allergic rhinitis due to pollen    Anxiety    Neuropathy    Other orders  -     gabapentin (NEURONTIN) 300 MG capsule; Take 1 capsule (300 mg total) by mouth 2 (two) times daily.  Dispense: 180 capsule; Refill: 1      Follow up in about 6 months (around 3/19/2023).        9/19/2022 Lizy Noble

## 2022-09-23 LAB — BACTERIA UR CULT: NO GROWTH

## 2022-09-28 ENCOUNTER — PATIENT MESSAGE (OUTPATIENT)
Dept: FAMILY MEDICINE | Facility: CLINIC | Age: 67
End: 2022-09-28

## 2022-09-28 NOTE — TELEPHONE ENCOUNTER
Kidney function decreased. Increase fluids. Avoid motrin aleve, etc  Cholesterol is elevated. Does she want to start crestor? Urine culture shows no bacterial growth  B12 is normal

## 2022-10-14 ENCOUNTER — PATIENT MESSAGE (OUTPATIENT)
Dept: FAMILY MEDICINE | Facility: CLINIC | Age: 67
End: 2022-10-14

## 2022-10-24 ENCOUNTER — LAB VISIT (OUTPATIENT)
Dept: LAB | Facility: HOSPITAL | Age: 67
End: 2022-10-24
Attending: INTERNAL MEDICINE
Payer: MEDICARE

## 2022-10-24 DIAGNOSIS — E04.2 NONTOXIC MULTINODULAR GOITER: ICD-10-CM

## 2022-10-24 DIAGNOSIS — E03.8 TOXIC DIFFUSE GOITER WITH PRETIBIAL MYXEDEMA: Primary | ICD-10-CM

## 2022-10-24 DIAGNOSIS — E05.00 TOXIC DIFFUSE GOITER WITH PRETIBIAL MYXEDEMA: Primary | ICD-10-CM

## 2022-10-24 LAB
T4 FREE SERPL-MCNC: 0.85 NG/DL (ref 0.71–1.51)
TSH SERPL DL<=0.005 MIU/L-ACNC: 2.04 UIU/ML (ref 0.34–5.6)

## 2022-10-24 PROCEDURE — 84443 ASSAY THYROID STIM HORMONE: CPT | Performed by: INTERNAL MEDICINE

## 2022-10-24 PROCEDURE — 84439 ASSAY OF FREE THYROXINE: CPT | Performed by: INTERNAL MEDICINE

## 2022-10-24 PROCEDURE — 36415 COLL VENOUS BLD VENIPUNCTURE: CPT | Performed by: INTERNAL MEDICINE

## 2022-11-03 DIAGNOSIS — G44.209 TENSION-TYPE HEADACHE, NOT INTRACTABLE, UNSPECIFIED CHRONICITY PATTERN: ICD-10-CM

## 2022-11-03 DIAGNOSIS — G43.009 MIGRAINE WITHOUT AURA AND WITHOUT STATUS MIGRAINOSUS, NOT INTRACTABLE: ICD-10-CM

## 2022-11-03 RX ORDER — BUTALBITAL, ASPIRIN, CAFFEINE AND CODEINE PHOSPHATE 50; 325; 40; 30 MG/1; MG/1; MG/1; MG/1
1 CAPSULE ORAL EVERY 6 HOURS PRN
Qty: 30 CAPSULE | Refills: 0 | Status: SHIPPED | OUTPATIENT
Start: 2022-11-03 | End: 2022-12-05 | Stop reason: SDUPTHER

## 2022-11-03 NOTE — TELEPHONE ENCOUNTER
----- Message from Monik Jaramillo sent at 11/3/2022  3:14 PM CDT -----  Vm- pt needs refill on butalbital with asprin   [phone]    
56-year-old male past medical history as documented presenting status post episode of unresponsiveness prior to arrival.  Patient was in the colorectal surgeon office today at which time he suddenly became unresponsive and at that time medical staff did not palpate a pulse so performed CPR.  CPR was performed for approximately 2 minutes after which time patient returned to baseline mental status.  Patient is nonverbal at baseline unable to provide further history.      Vital Signs: I have reviewed the initial vital signs.  Constitutional: NAD, well-nourished, appears stated age, no acute distress.  HEENT: Airway patent, moist MM, no erythema/swelling/deformity of oral structures. EOMI, PERRLA.  CV: regular rate, regular rhythm, well-perfused extremities, 2+ b/l DP and radial pulses equal.  Lungs: BCTA, no increased WOB.  ABD: NTND, no guarding or rebound, no pulsatile mass, no hernias.   MSK: Neck supple, nontender, nl ROM, no stepoff. Chest nontender. Back nontender in TLS spine or to b/l bony structures or flanks. Ext nontender, nl rom, no deformity.   INTEG: Skin warm, dry, no rash.  NEURO: (+) contracted b/l without apparent focal deficits  PSYCH: unable to assess    We will obtain labs, CT head, CTA chest abdomen pelvis to rule out acute traumatic injury from CPR, reeval.

## 2022-12-01 ENCOUNTER — TELEPHONE (OUTPATIENT)
Dept: FAMILY MEDICINE | Facility: CLINIC | Age: 67
End: 2022-12-01

## 2022-12-01 DIAGNOSIS — G44.209 TENSION-TYPE HEADACHE, NOT INTRACTABLE, UNSPECIFIED CHRONICITY PATTERN: ICD-10-CM

## 2022-12-01 DIAGNOSIS — G43.009 MIGRAINE WITHOUT AURA AND WITHOUT STATUS MIGRAINOSUS, NOT INTRACTABLE: ICD-10-CM

## 2022-12-01 NOTE — TELEPHONE ENCOUNTER
----- Message from Monik Jaramillo sent at 12/1/2022  2:26 PM CST -----  [facility]  [place] [address]   [phone]

## 2022-12-05 RX ORDER — BUTALBITAL, ASPIRIN, CAFFEINE AND CODEINE PHOSPHATE 50; 325; 40; 30 MG/1; MG/1; MG/1; MG/1
1 CAPSULE ORAL EVERY 6 HOURS PRN
Qty: 30 CAPSULE | Refills: 0 | Status: SHIPPED | OUTPATIENT
Start: 2022-12-05 | End: 2023-01-04

## 2023-01-09 ENCOUNTER — TELEPHONE (OUTPATIENT)
Dept: FAMILY MEDICINE | Facility: CLINIC | Age: 68
End: 2023-01-09

## 2023-01-09 RX ORDER — BUTALBITAL, ASPIRIN, CAFFEINE AND CODEINE PHOSPHATE 50; 325; 40; 30 MG/1; MG/1; MG/1; MG/1
1 CAPSULE ORAL EVERY 8 HOURS PRN
Qty: 30 CAPSULE | Refills: 0 | Status: SHIPPED | OUTPATIENT
Start: 2023-01-09 | End: 2023-02-06 | Stop reason: SDUPTHER

## 2023-01-09 NOTE — TELEPHONE ENCOUNTER
----- Message from Monik Jaramillo sent at 1/9/2023  1:13 PM CST -----  - pt needs refill on [facility]   [phone]

## 2023-02-03 ENCOUNTER — TELEPHONE (OUTPATIENT)
Dept: FAMILY MEDICINE | Facility: CLINIC | Age: 68
End: 2023-02-03

## 2023-02-03 NOTE — TELEPHONE ENCOUNTER
----- Message from Monik Jaramillo sent at 2/3/2023 11:06 AM CST -----  Vm-pt needs refill on butalbital with Asprin caffeine and codeine   [phone]

## 2023-02-06 RX ORDER — BUTALBITAL, ASPIRIN, CAFFEINE AND CODEINE PHOSPHATE 50; 325; 40; 30 MG/1; MG/1; MG/1; MG/1
1 CAPSULE ORAL EVERY 8 HOURS PRN
Qty: 30 CAPSULE | Refills: 0 | Status: SHIPPED | OUTPATIENT
Start: 2023-02-06 | End: 2023-03-06 | Stop reason: SDUPTHER

## 2023-02-24 ENCOUNTER — TELEPHONE (OUTPATIENT)
Dept: FAMILY MEDICINE | Facility: CLINIC | Age: 68
End: 2023-02-24

## 2023-02-24 DIAGNOSIS — I10 HYPERTENSION, ESSENTIAL: Primary | ICD-10-CM

## 2023-02-24 DIAGNOSIS — Z79.899 ENCOUNTER FOR LONG-TERM (CURRENT) USE OF OTHER MEDICATIONS: ICD-10-CM

## 2023-02-24 DIAGNOSIS — E03.9 HYPOTHYROIDISM, UNSPECIFIED TYPE: ICD-10-CM

## 2023-03-06 RX ORDER — BUTALBITAL, ASPIRIN, CAFFEINE AND CODEINE PHOSPHATE 50; 325; 40; 30 MG/1; MG/1; MG/1; MG/1
1 CAPSULE ORAL EVERY 8 HOURS PRN
Qty: 30 CAPSULE | Refills: 0 | Status: SHIPPED | OUTPATIENT
Start: 2023-03-06 | End: 2023-03-13 | Stop reason: SDUPTHER

## 2023-03-06 NOTE — TELEPHONE ENCOUNTER
----- Message from Monik Jaramillo sent at 3/6/2023  2:14 PM CST -----  - pt needs refill on [facility]   [phone]

## 2023-03-13 ENCOUNTER — OFFICE VISIT (OUTPATIENT)
Dept: FAMILY MEDICINE | Facility: CLINIC | Age: 68
End: 2023-03-13
Payer: MEDICARE

## 2023-03-13 VITALS
HEART RATE: 76 BPM | BODY MASS INDEX: 25.83 KG/M2 | HEIGHT: 65 IN | DIASTOLIC BLOOD PRESSURE: 66 MMHG | WEIGHT: 155 LBS | SYSTOLIC BLOOD PRESSURE: 134 MMHG

## 2023-03-13 DIAGNOSIS — Z78.0 MENOPAUSE: ICD-10-CM

## 2023-03-13 DIAGNOSIS — Z00.00 PHYSICAL EXAM: ICD-10-CM

## 2023-03-13 DIAGNOSIS — Z79.899 HIGH RISK MEDICATION USE: ICD-10-CM

## 2023-03-13 DIAGNOSIS — E03.4 HYPOTHYROIDISM DUE TO ACQUIRED ATROPHY OF THYROID: ICD-10-CM

## 2023-03-13 DIAGNOSIS — I10 HYPERTENSION, ESSENTIAL: ICD-10-CM

## 2023-03-13 DIAGNOSIS — F32.1 MODERATE MAJOR DEPRESSION: ICD-10-CM

## 2023-03-13 DIAGNOSIS — Z12.31 ENCOUNTER FOR SCREENING MAMMOGRAM FOR MALIGNANT NEOPLASM OF BREAST: Primary | ICD-10-CM

## 2023-03-13 DIAGNOSIS — G43.009 MIGRAINE WITHOUT AURA AND WITHOUT STATUS MIGRAINOSUS, NOT INTRACTABLE: ICD-10-CM

## 2023-03-13 DIAGNOSIS — J30.1 SEASONAL ALLERGIC RHINITIS DUE TO POLLEN: ICD-10-CM

## 2023-03-13 DIAGNOSIS — F41.9 ANXIETY: ICD-10-CM

## 2023-03-13 DIAGNOSIS — G62.9 NEUROPATHY: ICD-10-CM

## 2023-03-13 DIAGNOSIS — F33.1 MODERATE EPISODE OF RECURRENT MAJOR DEPRESSIVE DISORDER: ICD-10-CM

## 2023-03-13 PROCEDURE — 3008F PR BODY MASS INDEX (BMI) DOCUMENTED: ICD-10-PCS | Mod: CPTII,S$GLB,, | Performed by: NURSE PRACTITIONER

## 2023-03-13 PROCEDURE — 3008F BODY MASS INDEX DOCD: CPT | Mod: CPTII,S$GLB,, | Performed by: NURSE PRACTITIONER

## 2023-03-13 PROCEDURE — 3288F FALL RISK ASSESSMENT DOCD: CPT | Mod: CPTII,S$GLB,, | Performed by: NURSE PRACTITIONER

## 2023-03-13 PROCEDURE — 1160F PR REVIEW ALL MEDS BY PRESCRIBER/CLIN PHARMACIST DOCUMENTED: ICD-10-PCS | Mod: CPTII,S$GLB,, | Performed by: NURSE PRACTITIONER

## 2023-03-13 PROCEDURE — 3078F PR MOST RECENT DIASTOLIC BLOOD PRESSURE < 80 MM HG: ICD-10-PCS | Mod: CPTII,S$GLB,, | Performed by: NURSE PRACTITIONER

## 2023-03-13 PROCEDURE — 1101F PT FALLS ASSESS-DOCD LE1/YR: CPT | Mod: CPTII,S$GLB,, | Performed by: NURSE PRACTITIONER

## 2023-03-13 PROCEDURE — 3078F DIAST BP <80 MM HG: CPT | Mod: CPTII,S$GLB,, | Performed by: NURSE PRACTITIONER

## 2023-03-13 PROCEDURE — 1159F MED LIST DOCD IN RCRD: CPT | Mod: CPTII,S$GLB,, | Performed by: NURSE PRACTITIONER

## 2023-03-13 PROCEDURE — 1101F PR PT FALLS ASSESS DOC 0-1 FALLS W/OUT INJ PAST YR: ICD-10-PCS | Mod: CPTII,S$GLB,, | Performed by: NURSE PRACTITIONER

## 2023-03-13 PROCEDURE — 1159F PR MEDICATION LIST DOCUMENTED IN MEDICAL RECORD: ICD-10-PCS | Mod: CPTII,S$GLB,, | Performed by: NURSE PRACTITIONER

## 2023-03-13 PROCEDURE — 99214 OFFICE O/P EST MOD 30 MIN: CPT | Mod: S$GLB,,, | Performed by: NURSE PRACTITIONER

## 2023-03-13 PROCEDURE — 1160F RVW MEDS BY RX/DR IN RCRD: CPT | Mod: CPTII,S$GLB,, | Performed by: NURSE PRACTITIONER

## 2023-03-13 PROCEDURE — 3075F PR MOST RECENT SYSTOLIC BLOOD PRESS GE 130-139MM HG: ICD-10-PCS | Mod: CPTII,S$GLB,, | Performed by: NURSE PRACTITIONER

## 2023-03-13 PROCEDURE — 3288F PR FALLS RISK ASSESSMENT DOCUMENTED: ICD-10-PCS | Mod: CPTII,S$GLB,, | Performed by: NURSE PRACTITIONER

## 2023-03-13 PROCEDURE — 3075F SYST BP GE 130 - 139MM HG: CPT | Mod: CPTII,S$GLB,, | Performed by: NURSE PRACTITIONER

## 2023-03-13 PROCEDURE — 99214 PR OFFICE/OUTPT VISIT, EST, LEVL IV, 30-39 MIN: ICD-10-PCS | Mod: S$GLB,,, | Performed by: NURSE PRACTITIONER

## 2023-03-13 RX ORDER — BUPROPION HYDROCHLORIDE 300 MG/1
300 TABLET ORAL DAILY
Qty: 90 TABLET | Refills: 1 | Status: SHIPPED | OUTPATIENT
Start: 2023-03-13 | End: 2023-09-18 | Stop reason: SDUPTHER

## 2023-03-13 RX ORDER — AMLODIPINE BESYLATE 5 MG/1
5 TABLET ORAL DAILY
Qty: 90 TABLET | Refills: 1 | Status: SHIPPED | OUTPATIENT
Start: 2023-03-13 | End: 2023-09-18 | Stop reason: SDUPTHER

## 2023-03-13 RX ORDER — ESCITALOPRAM OXALATE 20 MG/1
20 TABLET ORAL DAILY
Qty: 90 TABLET | Refills: 1 | Status: SHIPPED | OUTPATIENT
Start: 2023-03-13 | End: 2023-09-18 | Stop reason: SDUPTHER

## 2023-03-13 RX ORDER — BUTALBITAL, ASPIRIN, CAFFEINE AND CODEINE PHOSPHATE 50; 325; 40; 30 MG/1; MG/1; MG/1; MG/1
1 CAPSULE ORAL EVERY 8 HOURS PRN
Qty: 30 CAPSULE | Refills: 0 | Status: SHIPPED | OUTPATIENT
Start: 2023-03-13 | End: 2023-04-12

## 2023-03-13 RX ORDER — METOPROLOL TARTRATE 50 MG/1
50 TABLET ORAL DAILY
Qty: 90 TABLET | Refills: 1 | Status: SHIPPED | OUTPATIENT
Start: 2023-03-13 | End: 2023-09-18 | Stop reason: SDUPTHER

## 2023-03-21 NOTE — PROGRESS NOTES
SUBJECTIVE:    Patient ID: Eulalia Bermudez is a 67 y.o. female.    Chief Complaint: Follow-up (6mth, brought bottles, Mammo ordered// SW)    67 year old female presents for check up.treated for migraines, depression, anxiety, hypothyroid. taking both wellbutrin and lexapro.feels like stress/anxiety is well controlled.  Followed by dr. Spencer . Due for labs. Last mammogram 4/22. Last dexa 4/22 . Last colonoscopy 2017 . 20 year follow up. Migraines have lessoned..' fioricet 30 per month.  Sleeps ok most nights.   bp is well controlled in office today.       Lab Visit on 10/24/2022   Component Date Value Ref Range Status    Free T4 10/24/2022 0.85  0.71 - 1.51 ng/dL Final    TSH 10/24/2022 2.040  0.340 - 5.600 uIU/mL Final   Lab Visit on 09/20/2022   Component Date Value Ref Range Status    WBC 09/20/2022 5.20  3.90 - 12.70 K/uL Final    RBC 09/20/2022 4.02  4.00 - 5.40 M/uL Final    Hemoglobin 09/20/2022 13.5  12.0 - 16.0 g/dL Final    Hematocrit 09/20/2022 41.6  37.0 - 48.5 % Final    MCV 09/20/2022 104 (H)  82 - 98 fL Final    MCH 09/20/2022 33.6 (H)  27.0 - 31.0 pg Final    MCHC 09/20/2022 32.5  32.0 - 36.0 g/dL Final    RDW 09/20/2022 12.5  11.5 - 14.5 % Final    Platelets 09/20/2022 230  150 - 450 K/uL Final    MPV 09/20/2022 10.3  9.2 - 12.9 fL Final    Immature Granulocytes 09/20/2022 0.4  0.0 - 0.5 % Final    Gran # (ANC) 09/20/2022 2.8  1.8 - 7.7 K/uL Final    Immature Grans (Abs) 09/20/2022 0.02  0.00 - 0.04 K/uL Final    Lymph # 09/20/2022 1.5  1.0 - 4.8 K/uL Final    Mono # 09/20/2022 0.6  0.3 - 1.0 K/uL Final    Eos # 09/20/2022 0.2  0.0 - 0.5 K/uL Final    Baso # 09/20/2022 0.05  0.00 - 0.20 K/uL Final    nRBC 09/20/2022 0  0 /100 WBC Final    Gran % 09/20/2022 53.5  38.0 - 73.0 % Final    Lymph % 09/20/2022 29.6  18.0 - 48.0 % Final    Mono % 09/20/2022 11.5  4.0 - 15.0 % Final    Eosinophil % 09/20/2022 4.0  0.0 - 8.0 % Final    Basophil % 09/20/2022 1.0  0.0 - 1.9 % Final    Differential Method  09/20/2022 Automated   Final    Sodium 09/20/2022 141  136 - 145 mmol/L Final    Potassium 09/20/2022 4.1  3.5 - 5.1 mmol/L Final    Chloride 09/20/2022 107  95 - 110 mmol/L Final    CO2 09/20/2022 29  23 - 29 mmol/L Final    Glucose 09/20/2022 88  70 - 110 mg/dL Final    BUN 09/20/2022 20  8 - 23 mg/dL Final    Creatinine 09/20/2022 1.1  0.5 - 1.4 mg/dL Final    Calcium 09/20/2022 8.7  8.7 - 10.5 mg/dL Final    Total Protein 09/20/2022 6.2  6.0 - 8.4 g/dL Final    Albumin 09/20/2022 3.5  3.5 - 5.2 g/dL Final    Total Bilirubin 09/20/2022 0.5  0.1 - 1.0 mg/dL Final    Alkaline Phosphatase 09/20/2022 81  55 - 135 U/L Final    AST 09/20/2022 23  10 - 40 U/L Final    ALT 09/20/2022 24  10 - 44 U/L Final    Anion Gap 09/20/2022 5 (L)  8 - 16 mmol/L Final    eGFR 09/20/2022 55.1 (A)  >60 mL/min/1.73 m^2 Final    Cholesterol 09/20/2022 238 (H)  120 - 199 mg/dL Final    Triglycerides 09/20/2022 95  30 - 150 mg/dL Final    HDL 09/20/2022 68  40 - 75 mg/dL Final    LDL Cholesterol 09/20/2022 151.0  63.0 - 159.0 mg/dL Final    HDL/Cholesterol Ratio 09/20/2022 28.6  20.0 - 50.0 % Final    Total Cholesterol/HDL Ratio 09/20/2022 3.5  2.0 - 5.0 Final    Non-HDL Cholesterol 09/20/2022 170  mg/dL Final    Microalbumin, Urine 09/20/2022 200.4 (H)  <19.9 ug/mL Final    Creatinine, Urine 09/20/2022 170.0  15.0 - 325.0 mg/dL Final    Microalb/Creat Ratio 09/20/2022 117.9 (H)  0.0 - 30.0 ug/mg Final    Specimen UA 09/20/2022 Urine, Clean Catch   Final    Color, UA 09/20/2022 Yellow  Yellow, Straw, Maci Final    Appearance, UA 09/20/2022 Clear  Clear Final    pH, UA 09/20/2022 6.0  5.0 - 8.0 Final    Specific Gravity, UA 09/20/2022 1.020  1.005 - 1.030 Final    Protein, UA 09/20/2022 1+ (A)  Negative Final    Glucose, UA 09/20/2022 Negative  Negative Final    Ketones, UA 09/20/2022 Negative  Negative Final    Bilirubin (UA) 09/20/2022 Negative  Negative Final    Occult Blood UA 09/20/2022 3+ (A)  Negative Final    Nitrite, UA 09/20/2022  Negative  Negative Final    Urobilinogen, UA 2022 Negative  Negative EU/dL Final    Leukocytes, UA 2022 1+ (A)  Negative Final    Vitamin B-12 2022 234  210 - 950 pg/mL Final    TSH 2022 3.520  0.340 - 5.600 uIU/mL Final    RBC, UA 2022 80 (H)  0 - 4 /hpf Final    WBC, UA 2022 12 (H)  0 - 5 /hpf Final    Bacteria 2022 Negative  None-Occ /hpf Final    Squam Epithel, UA 2022 1  /hpf Final    Hyaline Casts, UA 2022 4 (A)  0-1/lpf /lpf Final    Microscopic Comment 2022 SEE COMMENT   Final    Urine Culture, Routine 2022 No growth   Final       Past Medical History:   Diagnosis Date    Depression     Hypertension     Hypothyroidism     Kidney stones      Social History     Socioeconomic History    Marital status:    Tobacco Use    Smoking status: Never    Smokeless tobacco: Never   Substance and Sexual Activity    Alcohol use: Yes    Drug use: No    Sexual activity: Yes     Partners: Male     Past Surgical History:   Procedure Laterality Date     SECTION       Family History   Problem Relation Age of Onset    Cancer Mother     COPD Mother     Heart disease Mother     Hypertension Mother     Depression Mother     Hearing loss Mother     Emphysema Father     Breast cancer Maternal Aunt     Breast cancer Paternal Aunt        Review of patient's allergies indicates:   Allergen Reactions    Imitrex [sumatriptan] Anaphylaxis    Methylprednisolone Other (See Comments)     Flush and hot sweats    Egg Nausea Only    Penicillins Rash    Sulfa (sulfonamide antibiotics) Rash       Current Outpatient Medications:     cetirizine (ZYRTEC) 10 MG tablet, Take 10 mg by mouth once daily., Disp: , Rfl:     diclofenac sodium (VOLTAREN) 1 % Gel, Apply topically 2 (two) times daily as needed., Disp: 100 g, Rfl: 0    folic acid (FOLVITE) 400 MCG tablet, Take 400 mcg by mouth once daily., Disp: , Rfl:     gabapentin (NEURONTIN) 300 MG capsule, Take 1 capsule (300 mg  total) by mouth 2 (two) times daily., Disp: 180 capsule, Rfl: 1    ketoconazole (NIZORAL) 2 % cream, Apply topically once daily., Disp: 60 g, Rfl: 0    mupirocin (BACTROBAN) 2 % ointment, Apply topically 3 (three) times daily., Disp: 22 g, Rfl: 0    naproxen (NAPROSYN) 500 MG tablet, Take 1 tablet (500 mg total) by mouth 2 (two) times daily with meals., Disp: 30 tablet, Rfl: 0    promethazine (PHENERGAN) 25 MG tablet, Take 1 tablet (25 mg total) by mouth every 6 (six) hours as needed for Nausea., Disp: 10 tablet, Rfl: 0    tamsulosin (FLOMAX) 0.4 mg Cap, Take 1 capsule (0.4 mg total) by mouth once daily., Disp: 30 capsule, Rfl: 11    topiramate (TOPAMAX) 25 MG tablet, Take 1 tablet (25 mg total) by mouth 2 (two) times daily., Disp: 60 tablet, Rfl: 11    triamcinolone (NASACORT) 55 mcg nasal inhaler, 2 sprays by Nasal route once daily., Disp: 1 g, Rfl: 6    UNITHROID 100 mcg tablet, Take 100 mcg by mouth once daily., Disp: , Rfl:     UNITHROID 112 mcg tablet, Take 112 mcg by mouth once daily., Disp: , Rfl:     vitamin D (VITAMIN D3) 1000 units Tab, Take 1,000 Units by mouth once daily., Disp: , Rfl:     amLODIPine (NORVASC) 5 MG tablet, Take 1 tablet (5 mg total) by mouth once daily., Disp: 90 tablet, Rfl: 1    buPROPion (WELLBUTRIN XL) 300 MG 24 hr tablet, Take 1 tablet (300 mg total) by mouth once daily., Disp: 90 tablet, Rfl: 1    codeine-butalbital-ASA-caffeine (BUTALBITAL COMPOUND W/CODEINE) 95--40 mg Cap, Take 1 capsule by mouth every 8 (eight) hours as needed., Disp: 30 capsule, Rfl: 0    EScitalopram oxalate (LEXAPRO) 20 MG tablet, Take 1 tablet (20 mg total) by mouth once daily., Disp: 90 tablet, Rfl: 1    metoprolol tartrate (LOPRESSOR) 50 MG tablet, Take 1 tablet (50 mg total) by mouth once daily., Disp: 90 tablet, Rfl: 1    Review of Systems   Constitutional:  Negative for chills, fever and unexpected weight change.   HENT:  Negative for ear pain, rhinorrhea and sore throat.    Eyes:  Negative for  "pain and visual disturbance.   Respiratory:  Negative for cough and shortness of breath.    Cardiovascular:  Negative for chest pain, palpitations and leg swelling.   Gastrointestinal:  Negative for abdominal pain, diarrhea, nausea and vomiting.   Genitourinary:  Negative for difficulty urinating, hematuria and vaginal bleeding.   Musculoskeletal:  Negative for arthralgias.   Skin:  Negative for rash.   Neurological:  Negative for dizziness, weakness and headaches.   Psychiatric/Behavioral:  Negative for agitation and sleep disturbance. The patient is not nervous/anxious.         Objective:      Vitals:    03/13/23 1328   BP: 134/66   Pulse: 76   Weight: 70.3 kg (155 lb)   Height: 5' 5" (1.651 m)     Physical Exam  Vitals and nursing note reviewed.   Constitutional:       General: She is not in acute distress.     Appearance: Normal appearance. She is well-developed.   HENT:      Head: Normocephalic.      Right Ear: External ear normal.      Left Ear: External ear normal.   Eyes:      Conjunctiva/sclera: Conjunctivae normal.      Pupils: Pupils are equal, round, and reactive to light.   Neck:      Vascular: No JVD.   Cardiovascular:      Rate and Rhythm: Normal rate and regular rhythm.      Heart sounds: No murmur heard.  Pulmonary:      Effort: Pulmonary effort is normal.      Breath sounds: Normal breath sounds.   Abdominal:      General: Bowel sounds are normal.      Palpations: Abdomen is soft.   Musculoskeletal:         General: No deformity. Normal range of motion.      Cervical back: Normal range of motion and neck supple.   Lymphadenopathy:      Cervical: No cervical adenopathy.   Skin:     General: Skin is warm and dry.      Findings: No rash.   Neurological:      Mental Status: She is alert and oriented to person, place, and time.      Gait: Gait normal.   Psychiatric:         Speech: Speech normal.         Behavior: Behavior normal.         Assessment:       1. Encounter for screening mammogram for " malignant neoplasm of breast    2. Moderate major depression    3. Hypertension, essential    4. High risk medication use    5. Physical exam    6. Hypothyroidism due to acquired atrophy of thyroid    7. Migraine without aura and without status migrainosus, not intractable    8. Moderate episode of recurrent major depressive disorder    9. Anxiety    10. Seasonal allergic rhinitis due to pollen    11. Neuropathy    12. Menopause           Plan:       Encounter for screening mammogram for malignant neoplasm of breast  -     Mammo Digital Screening Bilat w/ Brennen; Future; Expected date: 04/13/2023    Moderate major depression  Comments:  increase lexapro to 20  Orders:  -     buPROPion (WELLBUTRIN XL) 300 MG 24 hr tablet; Take 1 tablet (300 mg total) by mouth once daily.  Dispense: 90 tablet; Refill: 1  -     EScitalopram oxalate (LEXAPRO) 20 MG tablet; Take 1 tablet (20 mg total) by mouth once daily.  Dispense: 90 tablet; Refill: 1    Hypertension, essential  -     amLODIPine (NORVASC) 5 MG tablet; Take 1 tablet (5 mg total) by mouth once daily.  Dispense: 90 tablet; Refill: 1  -     metoprolol tartrate (LOPRESSOR) 50 MG tablet; Take 1 tablet (50 mg total) by mouth once daily.  Dispense: 90 tablet; Refill: 1    High risk medication use  -     buPROPion (WELLBUTRIN XL) 300 MG 24 hr tablet; Take 1 tablet (300 mg total) by mouth once daily.  Dispense: 90 tablet; Refill: 1  -     amLODIPine (NORVASC) 5 MG tablet; Take 1 tablet (5 mg total) by mouth once daily.  Dispense: 90 tablet; Refill: 1  -     metoprolol tartrate (LOPRESSOR) 50 MG tablet; Take 1 tablet (50 mg total) by mouth once daily.  Dispense: 90 tablet; Refill: 1  -     EScitalopram oxalate (LEXAPRO) 20 MG tablet; Take 1 tablet (20 mg total) by mouth once daily.  Dispense: 90 tablet; Refill: 1  -     codeine-butalbital-ASA-caffeine (BUTALBITAL COMPOUND W/CODEINE) 02--40 mg Cap; Take 1 capsule by mouth every 8 (eight) hours as needed.  Dispense: 30 capsule;  Refill: 0  -     Mammo Digital Screening Bilat w/ Brennen; Future; Expected date: 04/13/2023  -     CBC Auto Differential; Future; Expected date: 03/13/2023  -     Comprehensive Metabolic Panel; Future; Expected date: 03/13/2023  -     Urinalysis, Reflex to Urine Culture Urine, Clean Catch; Future; Expected date: 03/13/2023  -     Lipid Panel; Future; Expected date: 03/13/2023    Physical exam  -     buPROPion (WELLBUTRIN XL) 300 MG 24 hr tablet; Take 1 tablet (300 mg total) by mouth once daily.  Dispense: 90 tablet; Refill: 1  -     amLODIPine (NORVASC) 5 MG tablet; Take 1 tablet (5 mg total) by mouth once daily.  Dispense: 90 tablet; Refill: 1  -     metoprolol tartrate (LOPRESSOR) 50 MG tablet; Take 1 tablet (50 mg total) by mouth once daily.  Dispense: 90 tablet; Refill: 1  -     EScitalopram oxalate (LEXAPRO) 20 MG tablet; Take 1 tablet (20 mg total) by mouth once daily.  Dispense: 90 tablet; Refill: 1  -     codeine-butalbital-ASA-caffeine (BUTALBITAL COMPOUND W/CODEINE) 46--40 mg Cap; Take 1 capsule by mouth every 8 (eight) hours as needed.  Dispense: 30 capsule; Refill: 0  -     Mammo Digital Screening Bilat w/ Brennen; Future; Expected date: 04/13/2023  -     CBC Auto Differential; Future; Expected date: 03/13/2023  -     Comprehensive Metabolic Panel; Future; Expected date: 03/13/2023  -     Urinalysis, Reflex to Urine Culture Urine, Clean Catch; Future; Expected date: 03/13/2023  -     Lipid Panel; Future; Expected date: 03/13/2023    Hypothyroidism due to acquired atrophy of thyroid    Migraine without aura and without status migrainosus, not intractable    Moderate episode of recurrent major depressive disorder    Anxiety    Seasonal allergic rhinitis due to pollen    Neuropathy    Menopause      Follow up in about 6 months (around 9/13/2023), or if symptoms worsen or fail to improve, for medication management.        3/21/2023 Lizy Noble

## 2023-04-17 ENCOUNTER — HOSPITAL ENCOUNTER (OUTPATIENT)
Dept: RADIOLOGY | Facility: HOSPITAL | Age: 68
Discharge: HOME | End: 2023-04-17
Attending: NURSE PRACTITIONER
Payer: MEDICARE

## 2023-04-17 DIAGNOSIS — Z79.899 HIGH RISK MEDICATION USE: ICD-10-CM

## 2023-04-17 DIAGNOSIS — Z00.00 PHYSICAL EXAM: ICD-10-CM

## 2023-04-17 DIAGNOSIS — Z12.31 ENCOUNTER FOR SCREENING MAMMOGRAM FOR MALIGNANT NEOPLASM OF BREAST: ICD-10-CM

## 2023-04-17 PROCEDURE — 77067 SCR MAMMO BI INCL CAD: CPT | Mod: TC,PO

## 2023-04-21 ENCOUNTER — LAB VISIT (OUTPATIENT)
Dept: LAB | Facility: HOSPITAL | Age: 68
End: 2023-04-21
Attending: NURSE PRACTITIONER
Payer: MEDICARE

## 2023-04-21 DIAGNOSIS — Z79.899 HIGH RISK MEDICATION USE: ICD-10-CM

## 2023-04-21 DIAGNOSIS — Z00.00 PHYSICAL EXAM: ICD-10-CM

## 2023-04-21 DIAGNOSIS — E05.00 TOXIC DIFFUSE GOITER WITH PRETIBIAL MYXEDEMA: ICD-10-CM

## 2023-04-21 DIAGNOSIS — E04.2 NONTOXIC MULTINODULAR GOITER: Primary | ICD-10-CM

## 2023-04-21 DIAGNOSIS — E55.9 AVITAMINOSIS D: ICD-10-CM

## 2023-04-21 DIAGNOSIS — E03.8 TOXIC DIFFUSE GOITER WITH PRETIBIAL MYXEDEMA: ICD-10-CM

## 2023-04-21 DIAGNOSIS — I10 HYPERTENSION, ESSENTIAL: ICD-10-CM

## 2023-04-21 LAB
25(OH)D3+25(OH)D2 SERPL-MCNC: 50 NG/ML (ref 30–96)
BACTERIA #/AREA URNS HPF: NEGATIVE /HPF
BASOPHILS # BLD AUTO: 0.07 K/UL (ref 0–0.2)
BASOPHILS NFR BLD: 1.5 % (ref 0–1.9)
CHOLEST SERPL-MCNC: 214 MG/DL (ref 120–199)
CHOLEST/HDLC SERPL: 3.1 {RATIO} (ref 2–5)
DIFFERENTIAL METHOD: ABNORMAL
EOSINOPHIL # BLD AUTO: 0.2 K/UL (ref 0–0.5)
EOSINOPHIL NFR BLD: 5 % (ref 0–8)
ERYTHROCYTE [DISTWIDTH] IN BLOOD BY AUTOMATED COUNT: 11.9 % (ref 11.5–14.5)
HCT VFR BLD AUTO: 41.6 % (ref 37–48.5)
HDLC SERPL-MCNC: 68 MG/DL (ref 40–75)
HDLC SERPL: 31.8 % (ref 20–50)
HGB BLD-MCNC: 13.6 G/DL (ref 12–16)
HYALINE CASTS #/AREA URNS LPF: 3 /LPF
IMM GRANULOCYTES # BLD AUTO: 0.01 K/UL (ref 0–0.04)
IMM GRANULOCYTES NFR BLD AUTO: 0.2 % (ref 0–0.5)
LDLC SERPL CALC-MCNC: 127.4 MG/DL (ref 63–159)
LYMPHOCYTES # BLD AUTO: 1.7 K/UL (ref 1–4.8)
LYMPHOCYTES NFR BLD: 36.6 % (ref 18–48)
MCH RBC QN AUTO: 34.1 PG (ref 27–31)
MCHC RBC AUTO-ENTMCNC: 32.7 G/DL (ref 32–36)
MCV RBC AUTO: 104 FL (ref 82–98)
MICROSCOPIC COMMENT: ABNORMAL
MONOCYTES # BLD AUTO: 0.4 K/UL (ref 0.3–1)
MONOCYTES NFR BLD: 9.1 % (ref 4–15)
NEUTROPHILS # BLD AUTO: 2.2 K/UL (ref 1.8–7.7)
NEUTROPHILS NFR BLD: 47.6 % (ref 38–73)
NONHDLC SERPL-MCNC: 146 MG/DL
NRBC BLD-RTO: 0 /100 WBC
PLATELET # BLD AUTO: 242 K/UL (ref 150–450)
PMV BLD AUTO: 10.4 FL (ref 9.2–12.9)
RBC # BLD AUTO: 3.99 M/UL (ref 4–5.4)
RBC #/AREA URNS HPF: 39 /HPF (ref 0–4)
SQUAMOUS #/AREA URNS HPF: 2 /HPF
T4 FREE SERPL-MCNC: 0.89 NG/DL (ref 0.71–1.51)
TRIGL SERPL-MCNC: 93 MG/DL (ref 30–150)
TSH SERPL DL<=0.005 MIU/L-ACNC: 5.66 UIU/ML (ref 0.34–5.6)
WBC # BLD AUTO: 4.62 K/UL (ref 3.9–12.7)
WBC #/AREA URNS HPF: 9 /HPF (ref 0–5)

## 2023-04-21 PROCEDURE — 80061 LIPID PANEL: CPT | Performed by: NURSE PRACTITIONER

## 2023-04-21 PROCEDURE — 81001 URINALYSIS AUTO W/SCOPE: CPT | Performed by: NURSE PRACTITIONER

## 2023-04-21 PROCEDURE — 84443 ASSAY THYROID STIM HORMONE: CPT | Performed by: NURSE PRACTITIONER

## 2023-04-21 PROCEDURE — 82306 VITAMIN D 25 HYDROXY: CPT | Performed by: NURSE PRACTITIONER

## 2023-04-21 PROCEDURE — 84439 ASSAY OF FREE THYROXINE: CPT | Performed by: NURSE PRACTITIONER

## 2023-04-21 PROCEDURE — 36415 COLL VENOUS BLD VENIPUNCTURE: CPT | Performed by: NURSE PRACTITIONER

## 2023-04-21 PROCEDURE — 85025 COMPLETE CBC W/AUTO DIFF WBC: CPT | Performed by: NURSE PRACTITIONER

## 2023-04-24 ENCOUNTER — TELEPHONE (OUTPATIENT)
Dept: FAMILY MEDICINE | Facility: CLINIC | Age: 68
End: 2023-04-24

## 2023-04-24 DIAGNOSIS — R31.9 HEMATURIA, UNSPECIFIED TYPE: Primary | ICD-10-CM

## 2023-04-24 NOTE — TELEPHONE ENCOUNTER
Spoke to patient with normal mammogram result. Verbalized understanding. Also, would like lab results. Aware Lizy is gone for the day

## 2023-04-25 NOTE — TELEPHONE ENCOUNTER
Spoke to patient with results. Said she is taking .1mg of Unithroid and sees Dr Pendleton May 1st. Sent results to her office. Said she has not seen urology. Who does Lizy recommend?

## 2023-04-25 NOTE — TELEPHONE ENCOUNTER
Thyroid is slightly underactive. How much unithroid is she takign? 2 dose on chart. Still has blood in urine. Has she seen urology about this   Rest of labs ok

## 2023-04-26 ENCOUNTER — TELEPHONE (OUTPATIENT)
Dept: FAMILY MEDICINE | Facility: CLINIC | Age: 68
End: 2023-04-26
Payer: MEDICARE

## 2023-04-27 ENCOUNTER — OFFICE VISIT (OUTPATIENT)
Dept: UROLOGY | Facility: CLINIC | Age: 68
End: 2023-04-27
Payer: MEDICARE

## 2023-04-27 VITALS — DIASTOLIC BLOOD PRESSURE: 76 MMHG | HEART RATE: 73 BPM | SYSTOLIC BLOOD PRESSURE: 140 MMHG

## 2023-04-27 DIAGNOSIS — R31.29 MICROSCOPIC HEMATURIA: ICD-10-CM

## 2023-04-27 LAB
BACTERIA #/AREA URNS HPF: ABNORMAL /HPF
BILIRUBIN, UA POC OHS: NEGATIVE
BLOOD, UA POC OHS: ABNORMAL
CLARITY, UA POC OHS: CLEAR
COLOR, UA POC OHS: YELLOW
GLUCOSE, UA POC OHS: NEGATIVE
KETONES, UA POC OHS: NEGATIVE
LEUKOCYTES, UA POC OHS: ABNORMAL
MICROSCOPIC COMMENT: ABNORMAL
NITRITE, UA POC OHS: NEGATIVE
PH, UA POC OHS: 5.5
PROTEIN, UA POC OHS: ABNORMAL
RBC #/AREA URNS HPF: 20 /HPF (ref 0–4)
SPECIFIC GRAVITY, UA POC OHS: 1.02
UROBILINOGEN, UA POC OHS: 0.2
WBC #/AREA URNS HPF: 4 /HPF (ref 0–5)

## 2023-04-27 PROCEDURE — 88112 CYTOPATH CELL ENHANCE TECH: CPT | Mod: 26,,, | Performed by: PATHOLOGY

## 2023-04-27 PROCEDURE — 88112 PR  CYTOPATH, CELL ENHANCE TECH: ICD-10-PCS | Mod: 26,,, | Performed by: PATHOLOGY

## 2023-04-27 PROCEDURE — 99999 PR PBB SHADOW E&M-EST. PATIENT-LVL IV: CPT | Mod: PBBFAC,,, | Performed by: NURSE PRACTITIONER

## 2023-04-27 PROCEDURE — 99204 OFFICE O/P NEW MOD 45 MIN: CPT | Mod: S$GLB,,, | Performed by: NURSE PRACTITIONER

## 2023-04-27 PROCEDURE — 3077F PR MOST RECENT SYSTOLIC BLOOD PRESSURE >= 140 MM HG: ICD-10-PCS | Mod: CPTII,S$GLB,, | Performed by: NURSE PRACTITIONER

## 2023-04-27 PROCEDURE — 81003 URINALYSIS AUTO W/O SCOPE: CPT | Mod: QW,S$GLB,, | Performed by: NURSE PRACTITIONER

## 2023-04-27 PROCEDURE — 3077F SYST BP >= 140 MM HG: CPT | Mod: CPTII,S$GLB,, | Performed by: NURSE PRACTITIONER

## 2023-04-27 PROCEDURE — 3288F PR FALLS RISK ASSESSMENT DOCUMENTED: ICD-10-PCS | Mod: CPTII,S$GLB,, | Performed by: NURSE PRACTITIONER

## 2023-04-27 PROCEDURE — 81000 URINALYSIS NONAUTO W/SCOPE: CPT | Performed by: NURSE PRACTITIONER

## 2023-04-27 PROCEDURE — 88112 CYTOPATH CELL ENHANCE TECH: CPT | Performed by: PATHOLOGY

## 2023-04-27 PROCEDURE — 1159F MED LIST DOCD IN RCRD: CPT | Mod: CPTII,S$GLB,, | Performed by: NURSE PRACTITIONER

## 2023-04-27 PROCEDURE — 81003 POCT URINALYSIS(INSTRUMENT): ICD-10-PCS | Mod: QW,S$GLB,, | Performed by: NURSE PRACTITIONER

## 2023-04-27 PROCEDURE — 3078F DIAST BP <80 MM HG: CPT | Mod: CPTII,S$GLB,, | Performed by: NURSE PRACTITIONER

## 2023-04-27 PROCEDURE — 87086 URINE CULTURE/COLONY COUNT: CPT | Performed by: NURSE PRACTITIONER

## 2023-04-27 PROCEDURE — 1101F PT FALLS ASSESS-DOCD LE1/YR: CPT | Mod: CPTII,S$GLB,, | Performed by: NURSE PRACTITIONER

## 2023-04-27 PROCEDURE — 99204 PR OFFICE/OUTPT VISIT, NEW, LEVL IV, 45-59 MIN: ICD-10-PCS | Mod: S$GLB,,, | Performed by: NURSE PRACTITIONER

## 2023-04-27 PROCEDURE — 3078F PR MOST RECENT DIASTOLIC BLOOD PRESSURE < 80 MM HG: ICD-10-PCS | Mod: CPTII,S$GLB,, | Performed by: NURSE PRACTITIONER

## 2023-04-27 PROCEDURE — 1126F AMNT PAIN NOTED NONE PRSNT: CPT | Mod: CPTII,S$GLB,, | Performed by: NURSE PRACTITIONER

## 2023-04-27 PROCEDURE — 3288F FALL RISK ASSESSMENT DOCD: CPT | Mod: CPTII,S$GLB,, | Performed by: NURSE PRACTITIONER

## 2023-04-27 PROCEDURE — 1126F PR PAIN SEVERITY QUANTIFIED, NO PAIN PRESENT: ICD-10-PCS | Mod: CPTII,S$GLB,, | Performed by: NURSE PRACTITIONER

## 2023-04-27 PROCEDURE — 1160F PR REVIEW ALL MEDS BY PRESCRIBER/CLIN PHARMACIST DOCUMENTED: ICD-10-PCS | Mod: CPTII,S$GLB,, | Performed by: NURSE PRACTITIONER

## 2023-04-27 PROCEDURE — 1160F RVW MEDS BY RX/DR IN RCRD: CPT | Mod: CPTII,S$GLB,, | Performed by: NURSE PRACTITIONER

## 2023-04-27 PROCEDURE — 1159F PR MEDICATION LIST DOCUMENTED IN MEDICAL RECORD: ICD-10-PCS | Mod: CPTII,S$GLB,, | Performed by: NURSE PRACTITIONER

## 2023-04-27 PROCEDURE — 1101F PR PT FALLS ASSESS DOC 0-1 FALLS W/OUT INJ PAST YR: ICD-10-PCS | Mod: CPTII,S$GLB,, | Performed by: NURSE PRACTITIONER

## 2023-04-27 PROCEDURE — 99999 PR PBB SHADOW E&M-EST. PATIENT-LVL IV: ICD-10-PCS | Mod: PBBFAC,,, | Performed by: NURSE PRACTITIONER

## 2023-04-27 NOTE — PROGRESS NOTES
CHIEF COMPLAINT:    Mrs Bermudez is a 67 y.o. female presenting for hematuria.  PRESENTING ILLNESS:    Eulalia Bermudez is a 67 y.o. female with a PMH of kidney stones, HTN, depression, hypothyroid who presents for hematuria. This is her initial clinic visit.    4/27/23  Patient presents to clinic for microscopic hematuria. Denies gross hematuria, flank pain, fever, chills, nausea or vomiting. Denies any issues voiding. Denies frequency, urgency. Denies vaginal bleeding.  UA today large blood, trace protein, trace leuk, otherwise negative    Patient does have hx of kidney stones. She passed a stone in 2016 and possibly in 2020. No surgery in the past for stones.     4/21/23   UA 1+ protein, 3+ blood, otherwise negative  Micro UA RBC 39, WBC 9, negative bacteria (no culture)    No hx of hysterectomy    No blood thinners    Good water intake    History of recurrent UTI: none  Personal or family hx of  malignancy: none  History of  trauma: none  Smoking history: never smoked    7/20/17 CT abd pelvis with contrast  Left kidney is  unremarkable. Parapelvic cyst at the left lower pole measures up to 4.6 cm.  Several nonobstructing calculi are evident at the lower pole of the right  kidney, with the largest calculus measuring 5 mm (image 82). Right ureter is  normal in caliber. Urinary bladder is unremarkable.    Urine cultures:   Lab Results   Component Value Date    LABURIN No growth 09/20/2022         REVIEW OF SYSTEMS:    Review of Systems    Constitutional: Negative for fever and chills.   HENT: Negative for hearing loss.   Eyes: Negative for visual disturbance.   Respiratory: Negative for shortness of breath.   Cardiovascular: Negative for chest pain.   Gastrointestinal: Negative for nausea, vomiting.   Genitourinary:  See above  Neurological: Negative for dizziness.   Hematological: Does not bruise/bleed easily.   Psychiatric/Behavioral: Negative for confusion.     PATIENT HISTORY:    Past Medical History:    Diagnosis Date    Depression     Hypertension     Hypothyroidism     Kidney stones        Past Surgical History:   Procedure Laterality Date     SECTION         Family History   Problem Relation Age of Onset    Cancer Mother     COPD Mother     Heart disease Mother     Hypertension Mother     Depression Mother     Hearing loss Mother     Emphysema Father     Breast cancer Paternal Aunt        Social History     Socioeconomic History    Marital status:    Tobacco Use    Smoking status: Never    Smokeless tobacco: Never   Substance and Sexual Activity    Alcohol use: Yes    Drug use: No    Sexual activity: Yes     Partners: Male       Allergies:  Imitrex [sumatriptan], Methylprednisolone, Egg, Penicillins, and Sulfa (sulfonamide antibiotics)    Medications:    Current Outpatient Medications:     amLODIPine (NORVASC) 5 MG tablet, Take 1 tablet (5 mg total) by mouth once daily., Disp: 90 tablet, Rfl: 1    buPROPion (WELLBUTRIN XL) 300 MG 24 hr tablet, Take 1 tablet (300 mg total) by mouth once daily., Disp: 90 tablet, Rfl: 1    cetirizine (ZYRTEC) 10 MG tablet, Take 10 mg by mouth once daily., Disp: , Rfl:     diclofenac sodium (VOLTAREN) 1 % Gel, Apply topically 2 (two) times daily as needed., Disp: 100 g, Rfl: 0    EScitalopram oxalate (LEXAPRO) 20 MG tablet, Take 1 tablet (20 mg total) by mouth once daily., Disp: 90 tablet, Rfl: 1    folic acid (FOLVITE) 400 MCG tablet, Take 400 mcg by mouth once daily., Disp: , Rfl:     gabapentin (NEURONTIN) 300 MG capsule, Take 1 capsule (300 mg total) by mouth 2 (two) times daily., Disp: 180 capsule, Rfl: 1    ketoconazole (NIZORAL) 2 % cream, Apply topically once daily., Disp: 60 g, Rfl: 0    metoprolol tartrate (LOPRESSOR) 50 MG tablet, Take 1 tablet (50 mg total) by mouth once daily., Disp: 90 tablet, Rfl: 1    mupirocin (BACTROBAN) 2 % ointment, Apply topically 3 (three) times daily., Disp: 22 g, Rfl: 0    naproxen (NAPROSYN) 500 MG tablet, Take 1 tablet (500  mg total) by mouth 2 (two) times daily with meals., Disp: 30 tablet, Rfl: 0    promethazine (PHENERGAN) 25 MG tablet, Take 1 tablet (25 mg total) by mouth every 6 (six) hours as needed for Nausea., Disp: 10 tablet, Rfl: 0    triamcinolone (NASACORT) 55 mcg nasal inhaler, 2 sprays by Nasal route once daily., Disp: 1 g, Rfl: 6    UNITHROID 100 mcg tablet, Take 100 mcg by mouth once daily., Disp: , Rfl:     UNITHROID 112 mcg tablet, Take 112 mcg by mouth once daily., Disp: , Rfl:     vitamin D (VITAMIN D3) 1000 units Tab, Take 1,000 Units by mouth once daily., Disp: , Rfl:     tamsulosin (FLOMAX) 0.4 mg Cap, Take 1 capsule (0.4 mg total) by mouth once daily., Disp: 30 capsule, Rfl: 11    topiramate (TOPAMAX) 25 MG tablet, Take 1 tablet (25 mg total) by mouth 2 (two) times daily., Disp: 60 tablet, Rfl: 11    PHYSICAL EXAMINATION:    Constitutional: She is oriented to person, place, and time. She appears well-developed and well-nourished.  She is in no apparent distress.    Neck: Normal ROM.     Cardiovascular: Normal rate.      Pulmonary/Chest: Effort normal. No respiratory distress.     Abdominal:  She exhibits no distension.  There is no CVA tenderness.     Neurological: She is alert and oriented to person, place, and time.     Skin: Skin is warm and dry.     Psych: Cooperative with normal affect.      Physical Exam      LABS:      Lab Results   Component Value Date    CREATININE 1.1 09/20/2022       IMPRESSION:    Encounter Diagnoses   Name Primary?    Microscopic hematuria        PLAN:  -I explained to the patient that the causes of hematuria, whether it be gross hematuria or microhematuria, are many. Nevertheless, I explained to the patient that the evaluation in both cases consists of upper tract imaging followed by flexible cystoscopy. I described the rationale and procedure for both and answered all questions.   Hematuria work up discussed in detail. We will proceed with Urinalysis, Urine culture, Urine  cytology, CT urogram, and Cystoscopy. Creatinine prior to CT urogram to ensure adequate kidney function.  -Urine sent for UA, culture and cytology.   -CT urogram scheduled with creat prior  -Message sent to MD regarding cysto    -Will call patient with results    -RTC based on results      I encouraged her or any of her family members to call or email me with questions and/or concerns.      45 minutes of total time spent on the encounter, which includes face to face time and non-face to face time preparing to see the patient (eg, review of tests), Obtaining and/or reviewing separately obtained history, Documenting clinical information in the electronic or other health record, Independently interpreting results (not separately reported) and communicating results to the patient/family/caregiver, or Care coordination (not separately reported).

## 2023-04-29 LAB — BACTERIA UR CULT: NO GROWTH

## 2023-05-01 DIAGNOSIS — R31.29 MICROHEMATURIA: ICD-10-CM

## 2023-05-01 DIAGNOSIS — R31.29 MICROSCOPIC HEMATURIA: Primary | ICD-10-CM

## 2023-05-01 LAB
FINAL PATHOLOGIC DIAGNOSIS: NORMAL
Lab: NORMAL

## 2023-05-03 RX ORDER — BUTALBITAL, ASPIRIN, CAFFEINE AND CODEINE PHOSPHATE 50; 325; 40; 30 MG/1; MG/1; MG/1; MG/1
1 CAPSULE ORAL EVERY 4 HOURS PRN
Qty: 30 CAPSULE | Refills: 0 | Status: SHIPPED | OUTPATIENT
Start: 2023-05-03 | End: 2023-06-05 | Stop reason: SDUPTHER

## 2023-05-03 NOTE — TELEPHONE ENCOUNTER
Spoke with patient who states she needs a refill on Butalbital w codeine - last sent on 3/13 and had an end date.

## 2023-05-03 NOTE — TELEPHONE ENCOUNTER
----- Message from Monik Jaramillo sent at 5/3/2023 10:29 AM CDT -----  - pt needs refill on butabarbital   [facility] [address]   [phone]

## 2023-05-04 ENCOUNTER — HOSPITAL ENCOUNTER (OUTPATIENT)
Dept: RADIOLOGY | Facility: HOSPITAL | Age: 68
Discharge: HOME | End: 2023-05-04
Attending: NURSE PRACTITIONER
Payer: MEDICARE

## 2023-05-04 DIAGNOSIS — R31.29 MICROSCOPIC HEMATURIA: ICD-10-CM

## 2023-05-04 PROCEDURE — 74178 CT ABD&PLV WO CNTR FLWD CNTR: CPT | Mod: TC

## 2023-05-04 PROCEDURE — 25500020 PHARM REV CODE 255

## 2023-05-04 PROCEDURE — 74178 CT UROGRAM ABD PELVIS W WO: ICD-10-PCS | Mod: 26,,, | Performed by: RADIOLOGY

## 2023-05-04 PROCEDURE — 74178 CT ABD&PLV WO CNTR FLWD CNTR: CPT | Mod: 26,,, | Performed by: RADIOLOGY

## 2023-05-04 RX ADMIN — IOHEXOL 125 ML: 350 INJECTION, SOLUTION INTRAVENOUS at 02:05

## 2023-05-05 ENCOUNTER — TELEPHONE (OUTPATIENT)
Dept: UROLOGY | Facility: CLINIC | Age: 68
End: 2023-05-05
Payer: MEDICARE

## 2023-05-05 NOTE — TELEPHONE ENCOUNTER
Dr. Preciado will see patient in clinic to review CT results and plan for stones.  Cancel cysto 5/22    Notified patient.  Staff to call and schedule f/u with Dr. Preciado next week

## 2023-05-05 NOTE — TELEPHONE ENCOUNTER
Spoke with patient regarding CT urogram results.  Will discuss further with Dr. Preciado. She is out of office today.  Will return call to patient once plan in place for stones/cysto  Pt verbalized understanding

## 2023-05-08 ENCOUNTER — HOSPITAL ENCOUNTER (EMERGENCY)
Facility: HOSPITAL | Age: 68
Discharge: HOME | End: 2023-05-08
Attending: EMERGENCY MEDICINE
Payer: MEDICARE

## 2023-05-08 VITALS
HEART RATE: 64 BPM | SYSTOLIC BLOOD PRESSURE: 146 MMHG | BODY MASS INDEX: 24.16 KG/M2 | WEIGHT: 145 LBS | DIASTOLIC BLOOD PRESSURE: 85 MMHG | TEMPERATURE: 98 F | RESPIRATION RATE: 16 BRPM | OXYGEN SATURATION: 98 % | HEIGHT: 65 IN
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
## 2023-05-08 PROCEDURE — 87070 CULTURE OTHR SPECIMN AEROBIC: CPT | Performed by: EMERGENCY MEDICINE

## 2023-05-08 PROCEDURE — 99283 EMERGENCY DEPT VISIT LOW MDM: CPT

## 2023-05-08 PROCEDURE — 25000003 PHARM REV CODE 250: Performed by: EMERGENCY MEDICINE

## 2023-05-08 RX ORDER — LIDOCAINE HYDROCHLORIDE 10 MG/ML
10 INJECTION, SOLUTION EPIDURAL; INFILTRATION; INTRACAUDAL; PERINEURAL
Status: COMPLETED | OUTPATIENT
Start: 2023-05-08 | End: 2023-05-08

## 2023-05-08 RX ADMIN — LIDOCAINE HYDROCHLORIDE 100 MG: 10 SOLUTION INTRAVENOUS at 11:05

## 2023-05-08 NOTE — ED PROVIDER NOTES
Encounter Date: 2023       History     Chief Complaint   Patient presents with    Animal Bite     Cat bite to left lower arm. On antibiotics for three days.     67-year-old female presents emergency department reports that her cat bit her left forearm 3 days ago she was seen a local urgent care placed on Flagyl and doxycycline.  Patient denies any associated fever she reports that her symptoms are getting better however there is still a reddened area with circumferential swelling.  Patient denies any fevers, distal numbness or tingling.    Review of patient's allergies indicates:   Allergen Reactions    Imitrex [sumatriptan] Anaphylaxis    Methylprednisolone Other (See Comments)     Flush and hot sweats    Egg Nausea Only    Penicillins Rash    Sulfa (sulfonamide antibiotics) Rash     Past Medical History:   Diagnosis Date    Depression     Hypertension     Hypothyroidism     Kidney stones      Past Surgical History:   Procedure Laterality Date     SECTION       Family History   Problem Relation Age of Onset    Cancer Mother     COPD Mother     Heart disease Mother     Hypertension Mother     Depression Mother     Hearing loss Mother     Emphysema Father     Breast cancer Paternal Aunt      Social History     Tobacco Use    Smoking status: Never    Smokeless tobacco: Never   Substance Use Topics    Alcohol use: Yes    Drug use: No     Review of Systems   Constitutional: Negative.    HENT: Negative.     Respiratory: Negative.     Cardiovascular: Negative.    Gastrointestinal: Negative.    Genitourinary: Negative.    Skin:  Positive for wound.   Neurological: Negative.    Hematological: Negative.    Psychiatric/Behavioral: Negative.     All other systems reviewed and are negative.    Physical Exam     Initial Vitals [23 1017]   BP Pulse Resp Temp SpO2   (!) 141/74 70 18 97.8 °F (36.6 °C) 98 %      MAP       --         Physical Exam    Nursing note and vitals reviewed.  Constitutional: She appears  well-developed and well-nourished.   HENT:   Head: Normocephalic and atraumatic.   Eyes: EOM are normal. Pupils are equal, round, and reactive to light.   Pulmonary/Chest: Breath sounds normal.   Abdominal: Abdomen is soft. Bowel sounds are normal.     Neurological: She is alert and oriented to person, place, and time. She has normal strength. GCS score is 15. GCS eye subscore is 4. GCS verbal subscore is 5. GCS motor subscore is 6.   Skin: Abscess noted.       ED Course   Procedures  Labs Reviewed   CULTURE, AEROBIC  (SPECIFY SOURCE)          Imaging Results              X-Ray Forearm Left (Final result)  Result time 05/08/23 10:55:00      Final result by Etienne Cavazos MD (05/08/23 10:55:00)                   Narrative:    CLINICAL HISTORY:  67 years (1955) Female Left forearm pain- Cat bit on lateral side of left mid forearm    TECHNIQUE:  XR FOREARM 2 VIEWS. 2 image(s) obtained.    COMPARISON:  None available.    FINDINGS:  No acute fracture or dislocation. Subcutaneous soft tissue swelling along the radial-volar aspect of the forearm with no radiopaque foreign body identified. The elbow and wrist joint appear to be maintained.    IMPRESSION:  No acute osseous abnormality.                  .    Electronically signed by:  Etienne Cavazos MD  5/8/2023 10:55 AM CDT Workstation: [id number]                                     Medications   LIDOcaine (PF) 10 mg/ml (1%) injection 100 mg (100 mg Infiltration Given 5/8/23 5893)     Medical Decision Making:   History:   Old Records Summarized: records from previous admission(s).  Initial Assessment:   67-year-old female presents emergency department reports that her cat bit her left forearm 3 days ago she was seen a local urgent care placed on Flagyl and doxycycline.  Patient denies any associated fever she reports that her symptoms are getting better however there is still a reddened area with circumferential swelling.  Patient denies any fevers, distal  numbness or tingling.    Differential Diagnosis:   Considerations include cellulitis, abscess  Clinical Tests:   Radiological Study: Ordered and Reviewed  ED Management:  67-year-old female presents emergency department with small area of redness to the right forearm status post cat bite from her own cat 3 days prior currently on Flagyl and doxycycline secondary to being allergic to sulfa and penicillin.  Patient reports that the wound is actually getting better however the center is hardened consistent with abscess the patient's wound was cleaned I am able to express a large amount of purulent drainage patient does not wish to move forward with formal incision and drainage because a lot of purulent drainage was expressed from the wound without making incision.  Wound culture was sent.  Patient was instructed to continue doxycycline and Flagyl given return precautions                        Clinical Impression:   Final diagnoses:  [W55.01XA] Cat bite  [W55.01XA] Cat bite, initial encounter (Primary)        ED Disposition Condition    Discharge Stable          ED Prescriptions    None       Follow-up Information       Follow up With Specialties Details Why Contact Info    Lizy Noble NP Family Medicine Schedule an appointment as soon as possible for a visit in 2 days  [address]  [address]  [facility] [address]  [phone]               MORTEZA Rojas  05/08/23 5186

## 2023-05-09 ENCOUNTER — OFFICE VISIT (OUTPATIENT)
Dept: UROLOGY | Facility: CLINIC | Age: 68
End: 2023-05-09
Payer: MEDICARE

## 2023-05-09 VITALS
BODY MASS INDEX: 25.51 KG/M2 | SYSTOLIC BLOOD PRESSURE: 141 MMHG | HEART RATE: 69 BPM | WEIGHT: 153.13 LBS | DIASTOLIC BLOOD PRESSURE: 76 MMHG | HEIGHT: 65 IN

## 2023-05-09 DIAGNOSIS — N20.0 NEPHROLITHIASIS: ICD-10-CM

## 2023-05-09 DIAGNOSIS — R31.29 MICROSCOPIC HEMATURIA: Primary | ICD-10-CM

## 2023-05-09 DIAGNOSIS — N28.1 RENAL CYST: ICD-10-CM

## 2023-05-09 LAB
BILIRUBIN, UA POC OHS: NEGATIVE
BLOOD, UA POC OHS: ABNORMAL
CLARITY, UA POC OHS: CLEAR
COLOR, UA POC OHS: YELLOW
GLUCOSE, UA POC OHS: NEGATIVE
KETONES, UA POC OHS: NEGATIVE
LEUKOCYTES, UA POC OHS: ABNORMAL
NITRITE, UA POC OHS: NEGATIVE
PH, UA POC OHS: 5.5
PROTEIN, UA POC OHS: 30
SPECIFIC GRAVITY, UA POC OHS: 1.02
UROBILINOGEN, UA POC OHS: 0.2

## 2023-05-09 PROCEDURE — 99999 PR PBB SHADOW E&M-EST. PATIENT-LVL IV: CPT | Mod: PBBFAC,,, | Performed by: UROLOGY

## 2023-05-09 PROCEDURE — 81003 URINALYSIS AUTO W/O SCOPE: CPT | Mod: QW,S$GLB,, | Performed by: UROLOGY

## 2023-05-09 PROCEDURE — 1101F PT FALLS ASSESS-DOCD LE1/YR: CPT | Mod: CPTII,S$GLB,, | Performed by: UROLOGY

## 2023-05-09 PROCEDURE — 3078F PR MOST RECENT DIASTOLIC BLOOD PRESSURE < 80 MM HG: ICD-10-PCS | Mod: CPTII,S$GLB,, | Performed by: UROLOGY

## 2023-05-09 PROCEDURE — 3288F PR FALLS RISK ASSESSMENT DOCUMENTED: ICD-10-PCS | Mod: CPTII,S$GLB,, | Performed by: UROLOGY

## 2023-05-09 PROCEDURE — 1159F PR MEDICATION LIST DOCUMENTED IN MEDICAL RECORD: ICD-10-PCS | Mod: CPTII,S$GLB,, | Performed by: UROLOGY

## 2023-05-09 PROCEDURE — 3077F PR MOST RECENT SYSTOLIC BLOOD PRESSURE >= 140 MM HG: ICD-10-PCS | Mod: CPTII,S$GLB,, | Performed by: UROLOGY

## 2023-05-09 PROCEDURE — 3008F PR BODY MASS INDEX (BMI) DOCUMENTED: ICD-10-PCS | Mod: CPTII,S$GLB,, | Performed by: UROLOGY

## 2023-05-09 PROCEDURE — 99215 OFFICE O/P EST HI 40 MIN: CPT | Mod: S$GLB,,, | Performed by: UROLOGY

## 2023-05-09 PROCEDURE — 1160F RVW MEDS BY RX/DR IN RCRD: CPT | Mod: CPTII,S$GLB,, | Performed by: UROLOGY

## 2023-05-09 PROCEDURE — 1126F PR PAIN SEVERITY QUANTIFIED, NO PAIN PRESENT: ICD-10-PCS | Mod: CPTII,S$GLB,, | Performed by: UROLOGY

## 2023-05-09 PROCEDURE — 1160F PR REVIEW ALL MEDS BY PRESCRIBER/CLIN PHARMACIST DOCUMENTED: ICD-10-PCS | Mod: CPTII,S$GLB,, | Performed by: UROLOGY

## 2023-05-09 PROCEDURE — 1101F PR PT FALLS ASSESS DOC 0-1 FALLS W/OUT INJ PAST YR: ICD-10-PCS | Mod: CPTII,S$GLB,, | Performed by: UROLOGY

## 2023-05-09 PROCEDURE — 81003 POCT URINALYSIS(INSTRUMENT): ICD-10-PCS | Mod: QW,S$GLB,, | Performed by: UROLOGY

## 2023-05-09 PROCEDURE — 3077F SYST BP >= 140 MM HG: CPT | Mod: CPTII,S$GLB,, | Performed by: UROLOGY

## 2023-05-09 PROCEDURE — 99215 PR OFFICE/OUTPT VISIT, EST, LEVL V, 40-54 MIN: ICD-10-PCS | Mod: S$GLB,,, | Performed by: UROLOGY

## 2023-05-09 PROCEDURE — 3288F FALL RISK ASSESSMENT DOCD: CPT | Mod: CPTII,S$GLB,, | Performed by: UROLOGY

## 2023-05-09 PROCEDURE — 99999 PR PBB SHADOW E&M-EST. PATIENT-LVL IV: ICD-10-PCS | Mod: PBBFAC,,, | Performed by: UROLOGY

## 2023-05-09 PROCEDURE — 3008F BODY MASS INDEX DOCD: CPT | Mod: CPTII,S$GLB,, | Performed by: UROLOGY

## 2023-05-09 PROCEDURE — 1126F AMNT PAIN NOTED NONE PRSNT: CPT | Mod: CPTII,S$GLB,, | Performed by: UROLOGY

## 2023-05-09 PROCEDURE — 1159F MED LIST DOCD IN RCRD: CPT | Mod: CPTII,S$GLB,, | Performed by: UROLOGY

## 2023-05-09 PROCEDURE — 3078F DIAST BP <80 MM HG: CPT | Mod: CPTII,S$GLB,, | Performed by: UROLOGY

## 2023-05-09 RX ORDER — METRONIDAZOLE 500 MG/1
500 TABLET ORAL 2 TIMES DAILY
COMMUNITY
Start: 2023-05-05 | End: 2023-06-13 | Stop reason: CLARIF

## 2023-05-09 RX ORDER — DOXYCYCLINE 100 MG/1
100 CAPSULE ORAL EVERY 12 HOURS
COMMUNITY
Start: 2023-05-05 | End: 2023-06-13 | Stop reason: CLARIF

## 2023-05-09 RX ORDER — VITAMIN B COMPLEX
TABLET ORAL
COMMUNITY
Start: 2023-05-01

## 2023-05-09 NOTE — PROGRESS NOTES
[facility] Note - [place]  Staff: MD Ilana  PCP: Lizy Noble NP  Date of Service: 05/09/2023      Subjective:     HPI: Eulalia Bermudez is a 67 y.o. female     Initial consult by ria in clinic on 4/27/23 for MH:  Patient presents to clinic for microscopic hematuria. Denies gross hematuria, flank pain, fever, chills, nausea or vomiting. Denies any issues voiding. Denies frequency, urgency. Denies vaginal bleeding.  UA today large blood, trace protein, trace leuk, otherwise negative  Patient does have hx of kidney stones. She passed a stone in 2016 and possibly in 2020. No surgery in the past for stones.   4/21/23 UA 1+ protein, 3+ blood, otherwise negative, Micro UA RBC 39, WBC 9, negative bacteria (no culture)  No hx of hysterectomy  No blood thinners  Good water intake  History of recurrent UTI: none  Personal or family hx of  malignancy: none  History of  trauma: none  Smoking history: never smoked  7/20/17 CT abd pelvis with contrast: Left kidney is unremarkable. Parapelvic cyst at the left lower pole measures up to 4.6 cm. Several nonobstructing calculi are evident at the lower pole of the right kidney, with the largest calculus measuring 5 mm (image 82). Right ureter is normal in caliber. Urinary bladder is unremarkable.       Interval history 5/9/23:  She ordered a CT urogram to evaluate her microscopic hematuria.  It showed a 2 cm stone in her right lower pole and right upper pole behind a large cyst obstructing those calices.  We cancel the cystoscopy had scheduled her for because blood in urine likely from these large stones.  Also shows some tiny nonobstructing stones in left kidney. Independent review of scans.  Here today to discuss her stones found on ctu.   No smoking history, on aspirin 325 mg for arthritis and mitral valve prolapse.  However has not seen cardiology since 2006.  Not diabetic.  Denies any flank pain or recurrent UTIs.  Denies any gross  hematuria  Never has seen a urologist, had a stone workup or taken topamax reglarly)                      Urine history: family history of kidney, bladder or prostate cancer:No, personal or family history of kidney stones: Yes ,tobacco use: No, anticoagulation: Yes - asa 325mg (has MVP, last saw  )  23  Large blood/tr leuk  23 Ng, void: large bld/tr wbc, 20 rbc/4 wbc/ rare bact, cytology neg  23 Void: 3+bld, 39 rbc/9 wbc/3 sq  22 Ng, void: 3+bld, 80 rbc/12 wbc/4 sq  21 Tr bld, 2 rbc/7 wbc  20 1+bld, 6 rbc/2 wbc/1 sq    REVIEW OF SYSTEMS:  Negative except for as stated above    Past Medical History:   Diagnosis Date    Depression     Hypertension     Hypothyroidism     Kidney stones        Past Surgical History:   Procedure Laterality Date     SECTION              Objective:     Vitals:    23 1019   BP: (!) 141/76   Pulse: 69         Assessment:     Eulalia Bermudez is a 67 y.o. female with       1. Microscopic hematuria    2. Nephrolithiasis    3. Renal cyst        She is 2 large stones in the right kidney.  Obstructing cyst.  Microhematuria likely from the stones.  Need to determine if she needs renal cyst aspiration  and sclerotherapy 1st followed by PCNL or place a stent, wait a week then do so cyst aspiration and sclerotherapy and followed by ureteroscopy week later.  Stones are large so I think PCNL would be better but would need to confirm this with urologist that does PCNL.     Plan:     Will get back to her by the end of week with plan.  Would also need clearance to be off aspirin if she needs to come off the PCNL.  However if we do ureteroscopy could stay on aspirin and would use aspirin 81.     Ultimately needs a stone workup.  Has some  tiny nonobstructing stones in left kidney.    Monik Preciado MD

## 2023-05-10 ENCOUNTER — PATIENT MESSAGE (OUTPATIENT)
Dept: UROLOGY | Facility: CLINIC | Age: 68
End: 2023-05-10
Payer: MEDICARE

## 2023-05-12 LAB — BACTERIA SPEC AEROBE CULT: NO GROWTH

## 2023-05-24 ENCOUNTER — PATIENT MESSAGE (OUTPATIENT)
Dept: UROLOGY | Facility: CLINIC | Age: 68
End: 2023-05-24
Payer: MEDICARE

## 2023-05-26 ENCOUNTER — OFFICE VISIT (OUTPATIENT)
Dept: UROLOGY | Facility: CLINIC | Age: 68
End: 2023-05-26
Attending: UROLOGY
Payer: MEDICARE

## 2023-05-26 VITALS
BODY MASS INDEX: 25.49 KG/M2 | WEIGHT: 153 LBS | SYSTOLIC BLOOD PRESSURE: 118 MMHG | DIASTOLIC BLOOD PRESSURE: 75 MMHG | HEART RATE: 70 BPM | HEIGHT: 65 IN

## 2023-05-26 DIAGNOSIS — N28.1 RENAL CYST: ICD-10-CM

## 2023-05-26 DIAGNOSIS — N20.0 NEPHROLITHIASIS: Primary | ICD-10-CM

## 2023-05-26 LAB
BILIRUB SERPL-MCNC: NEGATIVE MG/DL
BLOOD URINE, POC: ABNORMAL
CLARITY, POC UA: ABNORMAL
COLOR, POC UA: YELLOW
GLUCOSE UR QL STRIP: NEGATIVE
KETONES UR QL STRIP: NEGATIVE
LEUKOCYTE ESTERASE URINE, POC: ABNORMAL
NITRITE, POC UA: NEGATIVE
PH, POC UA: 5
PROTEIN, POC: ABNORMAL
SPECIFIC GRAVITY, POC UA: 1
UROBILINOGEN, POC UA: NORMAL

## 2023-05-26 PROCEDURE — 99999 PR PBB SHADOW E&M-EST. PATIENT-LVL V: ICD-10-PCS | Mod: PBBFAC,,, | Performed by: UROLOGY

## 2023-05-26 PROCEDURE — 87086 URINE CULTURE/COLONY COUNT: CPT | Performed by: UROLOGY

## 2023-05-26 PROCEDURE — 3074F PR MOST RECENT SYSTOLIC BLOOD PRESSURE < 130 MM HG: ICD-10-PCS | Mod: CPTII,S$GLB,, | Performed by: UROLOGY

## 2023-05-26 PROCEDURE — 3008F PR BODY MASS INDEX (BMI) DOCUMENTED: ICD-10-PCS | Mod: CPTII,S$GLB,, | Performed by: UROLOGY

## 2023-05-26 PROCEDURE — 99215 OFFICE O/P EST HI 40 MIN: CPT | Mod: S$GLB,,, | Performed by: UROLOGY

## 2023-05-26 PROCEDURE — 81002 POCT URINE DIPSTICK WITHOUT MICROSCOPE: ICD-10-PCS | Mod: S$GLB,,, | Performed by: UROLOGY

## 2023-05-26 PROCEDURE — 1125F AMNT PAIN NOTED PAIN PRSNT: CPT | Mod: CPTII,S$GLB,, | Performed by: UROLOGY

## 2023-05-26 PROCEDURE — 99215 PR OFFICE/OUTPT VISIT, EST, LEVL V, 40-54 MIN: ICD-10-PCS | Mod: S$GLB,,, | Performed by: UROLOGY

## 2023-05-26 PROCEDURE — 3078F DIAST BP <80 MM HG: CPT | Mod: CPTII,S$GLB,, | Performed by: UROLOGY

## 2023-05-26 PROCEDURE — 1159F PR MEDICATION LIST DOCUMENTED IN MEDICAL RECORD: ICD-10-PCS | Mod: CPTII,S$GLB,, | Performed by: UROLOGY

## 2023-05-26 PROCEDURE — 1159F MED LIST DOCD IN RCRD: CPT | Mod: CPTII,S$GLB,, | Performed by: UROLOGY

## 2023-05-26 PROCEDURE — 99999 PR PBB SHADOW E&M-EST. PATIENT-LVL V: CPT | Mod: PBBFAC,,, | Performed by: UROLOGY

## 2023-05-26 PROCEDURE — 1125F PR PAIN SEVERITY QUANTIFIED, PAIN PRESENT: ICD-10-PCS | Mod: CPTII,S$GLB,, | Performed by: UROLOGY

## 2023-05-26 PROCEDURE — 81002 URINALYSIS NONAUTO W/O SCOPE: CPT | Mod: S$GLB,,, | Performed by: UROLOGY

## 2023-05-26 PROCEDURE — 1160F RVW MEDS BY RX/DR IN RCRD: CPT | Mod: CPTII,S$GLB,, | Performed by: UROLOGY

## 2023-05-26 PROCEDURE — 3078F PR MOST RECENT DIASTOLIC BLOOD PRESSURE < 80 MM HG: ICD-10-PCS | Mod: CPTII,S$GLB,, | Performed by: UROLOGY

## 2023-05-26 PROCEDURE — 3074F SYST BP LT 130 MM HG: CPT | Mod: CPTII,S$GLB,, | Performed by: UROLOGY

## 2023-05-26 PROCEDURE — 1160F PR REVIEW ALL MEDS BY PRESCRIBER/CLIN PHARMACIST DOCUMENTED: ICD-10-PCS | Mod: CPTII,S$GLB,, | Performed by: UROLOGY

## 2023-05-26 PROCEDURE — 3008F BODY MASS INDEX DOCD: CPT | Mod: CPTII,S$GLB,, | Performed by: UROLOGY

## 2023-05-26 NOTE — PROGRESS NOTES
"Subjective:      Eulalia Bermudez is a 67 y.o. female who returns today regarding her nephrolithiasis.    Previously seen by Dr. Preciado and referred to me for possible PCNL. She recently presented with  and had CT Urogram for workup that indicated large right renal stones as well as a large rigo-pelvic cyst. Here today to discuss treatment option. No pain. No prior h/o nephrolithiasis.    The following portions of the patient's history were reviewed and updated as appropriate: allergies, current medications, past family history, past medical history, past social history, past surgical history and problem list.    Review of Systems  A comprehensive multipoint review of systems was negative except as otherwise stated in the HPI.     Objective:   Vitals: /75   Pulse 70   Ht 5' 5" (1.651 m)   Wt 69.4 kg (153 lb)   BMI 25.46 kg/m²     Physical Exam   General: alert and oriented, no acute distress  Respiratory: Symmetric expansion, non-labored breathing  Neuro: no gross deficits  Psych: normal judgment and insight, normal mood/affect, and non-anxious    Lab Review   Urinalysis demonstrates positive for leukocytes, red blood cells  Lab Results   Component Value Date    WBC 4.62 04/21/2023    HGB 13.6 04/21/2023    HCT 41.6 04/21/2023     (H) 04/21/2023     04/21/2023     Lab Results   Component Value Date    CREATININE 1.0 05/04/2023    CREATININE 1.04 11/13/2018    BUN 20 09/20/2022       Imaging (all images personally reviewed; agree with report below)  Results for orders placed during the hospital encounter of 05/04/23    CT Urogram Abd Pelvis W WO    Narrative  EXAMINATION:  CT UROGRAM ABD PELVIS W WO    CLINICAL HISTORY:  microscopic hematuria;Other microscopic hematuria    TECHNIQUE:  Low dose axial, sagittal and coronal reformations were obtained from the lung bases to the pubic symphysis before and following the IV administration of 125 mL of Omnipaque 350.  Timing was optimized for " nephrogram and excretory renal phases.    COMPARISON:  Images from a study 07/20/2017    FINDINGS:  Dependent hypoventilatory changes in visualized lung bases slightly more prominent today than on the prior exam.    Left kidney 2 mm punctate left renal stones.  No ureteral dilatation, opaque ureteral stone or ureteral obstruction. Tiny subcentimeter hypodensity suggesting cysts and 16 mm parapelvic cyst.    Right kidney; a couple 2.3 cm stones.  Large, slightly lobular 5.3 cm cyst splaying the calices and with extrinsic compression on the pelvis.  No ureteral dilatation opaque ureteral stone or ureteral obstruction.  There is very slight fat stranding around the cephalad aspect of the cyst and collecting structures at the level of the more cephalad of the renal stones for example axial series 2, images 57-61.  This could be chronic with very slight wall thickening suggested on the prior study of 07/20/2017.  Stone burden is increased in the right kidney is slightly larger today.    Urinary bladder mildly distended at time of the exam and as visualized is unremarkable in appearance    Liver unremarkable appearance.  No calcified stones the gallbladder or CT findings of acute cholecystitis.  No biliary duct dilatation.  Spleen not enlarged.  Adrenal glands unremarkable appearance.  Pancreas unremarkable appearance.  The abdominal aorta tapers without aneurysmal dilatation    Stomach, bowel, mesentery; small hiatal hernia.  Diverticulosis without CT findings of acute diverticulitis.  Normal appearing appendix.  No appreciable bowel wall thickening or inflammatory change.  No free intraperitoneal air or fluid    Reproductive organs; uterine calcifications consistent with calcified uterine fibroid measuring approximately 2.6 cm and smaller calcifications suggesting additional smaller fibroids.  These are in the fundus of the uterus.  Also in the lower uterine segment or cervical region is questionable 2.5 cm mass.   Suggest follow-up pelvic ultrasound    Adnexal region unremarkable appearance    Osseous structures show degenerative changes.  Scoliosis.  Disc space narrowing and disc desiccation in the spine    Impression  Small left renal stones.  16 mm parapelvic left renal cyst and small subcentimeter cysts.  No hydronephrosis, opaque ureteral stone or ureteral obstruction on the left    Right renal stones with right renal stone burden increased compared to prior exam, large right renal cyst increased in size with mass effect on the right renal collecting structures.  Slight fat stranding around the upper pole stone and calyx.    Calcified uterine fibroid.  Additional uterine mass at the level of the lower uterine segment or cervix may also be fibroid but suggest further evaluation follow-up pelvic ultrasound.    Additional findings as detailed above including diverticulosis without CT findings of acute diverticulitis.  Small hiatal hernia.      Electronically signed by: Arline Hartley MD  Date:    05/04/2023  Time:    15:24     Assessment and Plan:   1. Nephrolithiasis  2. Renal cyst  -- Reviewed imaging with patient and . Discussed large stones as well as large rigo-pelvic cyst, which is likely to cause difficulty accessing stones for any endoscopic treatment.  -- Recommend right PCNL for stones. Previously discussed w/ Dr. Reeves, who can also aspirate cyst at time of PCN placement to improve ease of access to the two stones.   -- Risks and benefits reviewed in detail. She wishes to proceed. Will schedule with IR on 6/19 followed by PCNL on 6/20. Will sign consent at time of surgery.    I spent a total of 40 minutes on the day of the visit.  This includes face to face time and non-face to face time preparing to see the patient (eg, review of tests), obtaining and/or reviewing separately obtained history, documenting clinical information in the electronic or other health record, independently interpreting results  and communicating results to the patient/family/caregiver, or care coordinator.

## 2023-05-29 LAB — BACTERIA UR CULT: NORMAL

## 2023-06-05 RX ORDER — BUTALBITAL, ASPIRIN, CAFFEINE AND CODEINE PHOSPHATE 50; 325; 40; 30 MG/1; MG/1; MG/1; MG/1
1 CAPSULE ORAL EVERY 4 HOURS PRN
Qty: 30 CAPSULE | Refills: 0 | Status: SHIPPED | OUTPATIENT
Start: 2023-06-05 | End: 2023-07-06 | Stop reason: SDUPTHER

## 2023-06-05 NOTE — TELEPHONE ENCOUNTER
----- Message from Monik Jaramillo sent at 6/5/2023  2:12 PM CDT -----  Vm- 1:44- pt needs refill on butalbital with codeine   [facility] [address]   [phone]

## 2023-06-13 ENCOUNTER — ANESTHESIA EVENT (OUTPATIENT)
Dept: SURGERY | Facility: OTHER | Age: 68
End: 2023-06-13
Payer: MEDICARE

## 2023-06-13 ENCOUNTER — HOSPITAL ENCOUNTER (OUTPATIENT)
Dept: PREADMISSION TESTING | Facility: OTHER | Age: 68
Discharge: HOME | End: 2023-06-13
Attending: UROLOGY
Payer: MEDICARE

## 2023-06-13 VITALS
BODY MASS INDEX: 25.49 KG/M2 | WEIGHT: 153 LBS | OXYGEN SATURATION: 99 % | TEMPERATURE: 98 F | DIASTOLIC BLOOD PRESSURE: 84 MMHG | HEIGHT: 65 IN | RESPIRATION RATE: 16 BRPM | HEART RATE: 63 BPM | SYSTOLIC BLOOD PRESSURE: 168 MMHG

## 2023-06-13 DIAGNOSIS — Z01.818 PREOP TESTING: Primary | ICD-10-CM

## 2023-06-13 LAB
ANION GAP SERPL CALC-SCNC: 11 MMOL/L (ref 8–16)
BASOPHILS # BLD AUTO: 0.05 K/UL (ref 0–0.2)
BASOPHILS NFR BLD: 0.9 % (ref 0–1.9)
BUN SERPL-MCNC: 23 MG/DL (ref 8–23)
CALCIUM SERPL-MCNC: 9.1 MG/DL (ref 8.7–10.5)
CHLORIDE SERPL-SCNC: 106 MMOL/L (ref 95–110)
CO2 SERPL-SCNC: 25 MMOL/L (ref 23–29)
CREAT SERPL-MCNC: 1 MG/DL (ref 0.5–1.4)
DIFFERENTIAL METHOD: ABNORMAL
EOSINOPHIL # BLD AUTO: 0.2 K/UL (ref 0–0.5)
EOSINOPHIL NFR BLD: 3.4 % (ref 0–8)
ERYTHROCYTE [DISTWIDTH] IN BLOOD BY AUTOMATED COUNT: 12.5 % (ref 11.5–14.5)
EST. GFR  (NO RACE VARIABLE): >60 ML/MIN/1.73 M^2
GLUCOSE SERPL-MCNC: 85 MG/DL (ref 70–110)
HCT VFR BLD AUTO: 41.5 % (ref 37–48.5)
HGB BLD-MCNC: 13.5 G/DL (ref 12–16)
IMM GRANULOCYTES # BLD AUTO: 0.01 K/UL (ref 0–0.04)
IMM GRANULOCYTES NFR BLD AUTO: 0.2 % (ref 0–0.5)
LYMPHOCYTES # BLD AUTO: 1.9 K/UL (ref 1–4.8)
LYMPHOCYTES NFR BLD: 33.9 % (ref 18–48)
MCH RBC QN AUTO: 33.5 PG (ref 27–31)
MCHC RBC AUTO-ENTMCNC: 32.5 G/DL (ref 32–36)
MCV RBC AUTO: 103 FL (ref 82–98)
MONOCYTES # BLD AUTO: 0.6 K/UL (ref 0.3–1)
MONOCYTES NFR BLD: 10.5 % (ref 4–15)
NEUTROPHILS # BLD AUTO: 2.8 K/UL (ref 1.8–7.7)
NEUTROPHILS NFR BLD: 51.1 % (ref 38–73)
NRBC BLD-RTO: 0 /100 WBC
PLATELET # BLD AUTO: 189 K/UL (ref 150–450)
PMV BLD AUTO: 9.5 FL (ref 9.2–12.9)
POTASSIUM SERPL-SCNC: 4.8 MMOL/L (ref 3.5–5.1)
RBC # BLD AUTO: 4.03 M/UL (ref 4–5.4)
SODIUM SERPL-SCNC: 142 MMOL/L (ref 136–145)
WBC # BLD AUTO: 5.52 K/UL (ref 3.9–12.7)

## 2023-06-13 PROCEDURE — 36415 COLL VENOUS BLD VENIPUNCTURE: CPT | Performed by: ANESTHESIOLOGY

## 2023-06-13 PROCEDURE — 85025 COMPLETE CBC W/AUTO DIFF WBC: CPT | Performed by: ANESTHESIOLOGY

## 2023-06-13 PROCEDURE — 80048 BASIC METABOLIC PNL TOTAL CA: CPT | Performed by: ANESTHESIOLOGY

## 2023-06-13 RX ORDER — SODIUM CHLORIDE, SODIUM LACTATE, POTASSIUM CHLORIDE, CALCIUM CHLORIDE 600; 310; 30; 20 MG/100ML; MG/100ML; MG/100ML; MG/100ML
INJECTION, SOLUTION INTRAVENOUS CONTINUOUS
Status: CANCELLED | OUTPATIENT
Start: 2023-06-13

## 2023-06-13 RX ORDER — LIDOCAINE HYDROCHLORIDE 10 MG/ML
0.5 INJECTION, SOLUTION EPIDURAL; INFILTRATION; INTRACAUDAL; PERINEURAL ONCE
Status: CANCELLED | OUTPATIENT
Start: 2023-06-13 | End: 2023-06-13

## 2023-06-13 RX ORDER — ACETAMINOPHEN 500 MG
500 TABLET ORAL EVERY 6 HOURS PRN
COMMUNITY

## 2023-06-13 NOTE — ANESTHESIA PREPROCEDURE EVALUATION
06/13/2023  Eulalia Bermudez is a 67 y.o., female.      Pre-op Assessment    I have reviewed the Patient Summary Reports.     I have reviewed the Nursing Notes. I have reviewed the NPO Status.   I have reviewed the Medications.     Review of Systems  Anesthesia Hx:  No previous Anesthesia  History of prior surgery of interest to airway management or planning: Denies Family Hx of Anesthesia complications.   Denies Personal Hx of Anesthesia complications.   Social:  Non-Smoker, Social Alcohol Use    Hematology/Oncology:  Hematology Normal   Oncology Normal     EENT/Dental:EENT/Dental Normal   Cardiovascular:   Hypertension Valvular problems/Murmurs, MVP    Pulmonary:  Pulmonary Normal    Renal/:   Chronic Renal Disease renal calculi    Hepatic/GI:  Hepatic/GI Normal    Musculoskeletal:   Arthritis     Neurological:   Headaches    Endocrine:   Hypothyroidism    Dermatological:  Skin Normal    Psych:   Psychiatric History depression          Physical Exam  General: Cooperative, Alert and Oriented    Airway:  Mallampati: II   Mouth Opening: Normal  Neck ROM: Normal ROM    Dental:  Caps / Implants        Anesthesia Plan  Type of Anesthesia, risks & benefits discussed:    Anesthesia Type: Gen ETT  Intra-op Monitoring Plan: Standard ASA Monitors  Post Op Pain Control Plan: multimodal analgesia  Induction:  IV  Airway Plan: Video, Post-Induction  Informed Consent: Informed consent signed with the Patient and all parties understand the risks and agree with anesthesia plan.  All questions answered.   ASA Score: 2  Anesthesia Plan Notes: Labs today    Day of surgery update: labs reviewed, OK    Ready For Surgery From Anesthesia Perspective.     .

## 2023-06-13 NOTE — DISCHARGE INSTRUCTIONS
Information to Prepare you for your Surgery    PRE-ADMIT TESTING -  [phone]    [address] [facility]          Your surgery has been scheduled at [facility]. We are pleased to have the opportunity to serve you. For Further Information please call [phone].    On the day of surgery please report to the Information Desk on the 1st floor.    CONTACT YOUR PHYSICIAN'S OFFICE THE DAY PRIOR TO YOUR SURGERY TO OBTAIN YOUR ARRIVAL TIME.     The evening before surgery do not eat anything after 9 p.m. ( this includes hard candy, chewing gum and mints).  You may only have GATORADE, POWERADE AND WATER  from 9 p.m. until you leave your home.   DO NOT DRINK ANY LIQUIDS ON THE WAY TO THE HOSPITAL.      Why does your anesthesiologist allow you to drink Gatorade/Powerade before surgery?  Gatorade/Powerade helps to increase your comfort before surgery and to decrease your nausea after surgery. The carbohydrates in Gatorade/Powerade help reduce your body's stress response to surgery.  If you are a diabetic-drink only water prior to surgery.       Patients may have 2 visitors pre and post procedure. Only 2 visitors will be allowed in the Surgical building with the patient. No one under the age of 12 will be allowed into the facility.    SPECIAL MEDICATION INSTRUCTIONS: TAKE medications checked off by the Anesthesiologist on your Medication List.    Angiogram Patients: Take medications as instructed by your physician, including aspirin.     Surgery Patients:    If you take ASPIRIN - Your PHYSICIAN/SURGEON will need to inform you IF/OR when you need to stop taking aspirin prior to your surgery.     The week prior to surgery do not ot take any medications containing IBUPROFEN or NSAIDS ( Advil, Motrin, Goodys, BC, Aleve, Naproxen etc) If you are not sure if you should take a medicine please call your surgeon's office.  Ok to take Tylenol    Do Not Wear any make-up  (especially eye make-up) to surgery. Please remove any false eyelashes or eyelash extensions. If you arrive the day of surgery with makeup/eyelashes on you will be required to remove prior to surgery. (There is a risk of corneal abrasions if eye makeup/eyelash extensions are not removed)      Leave all valuables at home.   Do Not wear any jewelry or watches, including any metal in body piercings. Jewelry must be removed prior to coming to the hospital.  There is a possibility that rings that are unable to be removed may be cut off if they are on the surgical extremity.    Please remove all hair extensions, wigs, clips and any other metal accessories/ ornaments from your hair.  These items may pose a flammable/fire risk in Surgery and must be removed.    Do not shave your surgical area at least 5 days prior to your surgery. The surgical prep will be performed at the hospital according to Infection Control regulations.    Contact Lens must be removed before surgery. Either do not wear the contact lens or bring a case and solution for storage.  Please bring a container for eyeglasses or dentures as required.  Bring any paperwork your physician has provided, such as consent forms,  history and physicals, doctor's orders, etc.   Bring comfortable clothes that are loose fitting to wear upon discharge. Take into consideration the type of surgery being performed.  Maintain your diet as advised per your physician the day prior to surgery.      Adequate rest the night before surgery is advised.   Park in the Parking lot behind the hospital or in the [facility] across the street from the parking lot. Parking is complimentary.  If you will be discharged the same day as your procedure, please arrange for a responsible adult to drive you home or to accompany you if traveling by taxi.   YOU WILL NOT BE PERMITTED TO DRIVE OR TO LEAVE THE HOSPITAL ALONE AFTER SURGERY.   If you are being discharged the same day, it is  strongly recommended that you arrange for someone to remain with you for the first 24 hrs following your surgery.    The Surgeon will speak to your family/visitor after your surgery regarding the outcome of your surgery and post op care.  The Surgeon may speak to you after your surgery, but there is a possibility you may not remember the details.  Please check with your family members regarding the conversation with the Surgeon.    We strongly recommend whoever is bringing you home be present for discharge instructions.  This will ensure a thorough understanding for your post op home care.    ALL CHILDREN MUST ALWAYS BE ACCOMPANIED BY AN ADULT.    Visitors-Refer to current Visitor policy handouts.    Thank you for your cooperation.  The Staff of [facility].            Bathing Instructions with Hibiclens    Shower the evening before and morning of your procedure with Chlorhexidine (Hibiclens)  do not use Chlorhexidine on your face or genitals. Do not get in your eyes.  Wash your face with water and your regular face wash/soap  Use your regular shampoo  Apply Chlorhexidine (Hibiclens) directly on your skin or on a wet washcloth and wash gently. When showering: Move away from the shower stream when applying Chlorhexidine (Hibiclens) to avoid rinsing off too soon.  Rinse thoroughly with warm water  Do not dilute Chlorhexidine (Hibiclens)   Dry off as usual, do not use any deodorant, powder, body lotions, perfume, after shave or cologne.

## 2023-06-19 ENCOUNTER — HOSPITAL ENCOUNTER (OUTPATIENT)
Facility: OTHER | Age: 68
LOS: 1 days | Discharge: HOME | End: 2023-06-21
Attending: RADIOLOGY | Admitting: UROLOGY
Payer: MEDICARE

## 2023-06-19 DIAGNOSIS — N20.0 NEPHROLITHIASIS: ICD-10-CM

## 2023-06-19 DIAGNOSIS — N28.1 RENAL CYST: ICD-10-CM

## 2023-06-19 DIAGNOSIS — N22 CALCULUS OF URINARY TRACT IN DISEASES CLASSIFIED ELSEWHERE: Primary | ICD-10-CM

## 2023-06-19 PROCEDURE — 25000003 PHARM REV CODE 250: Performed by: STUDENT IN AN ORGANIZED HEALTH CARE EDUCATION/TRAINING PROGRAM

## 2023-06-19 PROCEDURE — C1887 CATHETER, GUIDING: HCPCS | Performed by: RADIOLOGY

## 2023-06-19 PROCEDURE — 99152 MOD SED SAME PHYS/QHP 5/>YRS: CPT | Performed by: RADIOLOGY

## 2023-06-19 PROCEDURE — G0378 HOSPITAL OBSERVATION PER HR: HCPCS

## 2023-06-19 PROCEDURE — 25000003 PHARM REV CODE 250: Performed by: RADIOLOGY

## 2023-06-19 PROCEDURE — 94799 UNLISTED PULMONARY SVC/PX: CPT

## 2023-06-19 PROCEDURE — 94761 N-INVAS EAR/PLS OXIMETRY MLT: CPT

## 2023-06-19 PROCEDURE — 99900035 HC TECH TIME PER 15 MIN (STAT)

## 2023-06-19 PROCEDURE — 99153 MOD SED SAME PHYS/QHP EA: CPT | Performed by: RADIOLOGY

## 2023-06-19 PROCEDURE — 96361 HYDRATE IV INFUSION ADD-ON: CPT

## 2023-06-19 PROCEDURE — 63600175 PHARM REV CODE 636 W HCPCS: Performed by: RADIOLOGY

## 2023-06-19 PROCEDURE — 99900031 HC PATIENT EDUCATION (STAT)

## 2023-06-19 RX ORDER — CHOLECALCIFEROL (VITAMIN D3) 25 MCG
1000 TABLET ORAL DAILY
Status: DISCONTINUED | OUTPATIENT
Start: 2023-06-20 | End: 2023-06-19 | Stop reason: HOSPADM

## 2023-06-19 RX ORDER — BUPROPION HYDROCHLORIDE 150 MG/1
300 TABLET ORAL DAILY
Status: DISCONTINUED | OUTPATIENT
Start: 2023-06-20 | End: 2023-06-19 | Stop reason: HOSPADM

## 2023-06-19 RX ORDER — MIDAZOLAM HYDROCHLORIDE 1 MG/ML
INJECTION INTRAMUSCULAR; INTRAVENOUS
Status: DISCONTINUED | OUTPATIENT
Start: 2023-06-19 | End: 2023-06-19 | Stop reason: HOSPADM

## 2023-06-19 RX ORDER — FOLIC ACID 0.4 MG
400 TABLET ORAL DAILY
Status: DISCONTINUED | OUTPATIENT
Start: 2023-06-19 | End: 2023-06-19

## 2023-06-19 RX ORDER — LEVOTHYROXINE SODIUM 50 UG/1
100 TABLET ORAL
Status: DISCONTINUED | OUTPATIENT
Start: 2023-06-21 | End: 2023-06-19 | Stop reason: HOSPADM

## 2023-06-19 RX ORDER — FENTANYL CITRATE 50 UG/ML
INJECTION, SOLUTION INTRAMUSCULAR; INTRAVENOUS
Status: DISCONTINUED | OUTPATIENT
Start: 2023-06-19 | End: 2023-06-19 | Stop reason: HOSPADM

## 2023-06-19 RX ORDER — GABAPENTIN 300 MG/1
300 CAPSULE ORAL 2 TIMES DAILY
Status: DISCONTINUED | OUTPATIENT
Start: 2023-06-19 | End: 2023-06-19

## 2023-06-19 RX ORDER — METOPROLOL TARTRATE 50 MG/1
50 TABLET ORAL DAILY
Status: DISCONTINUED | OUTPATIENT
Start: 2023-06-19 | End: 2023-06-19

## 2023-06-19 RX ORDER — LEVOTHYROXINE SODIUM 112 UG/1
112 TABLET ORAL
Status: DISCONTINUED | OUTPATIENT
Start: 2023-06-20 | End: 2023-06-19 | Stop reason: HOSPADM

## 2023-06-19 RX ORDER — METHOCARBAMOL 500 MG/1
500 TABLET, FILM COATED ORAL 4 TIMES DAILY
Status: DISCONTINUED | OUTPATIENT
Start: 2023-06-19 | End: 2023-06-21 | Stop reason: HOSPADM

## 2023-06-19 RX ORDER — OXYCODONE HYDROCHLORIDE 5 MG/1
5 TABLET ORAL EVERY 6 HOURS PRN
Status: DISCONTINUED | OUTPATIENT
Start: 2023-06-19 | End: 2023-06-21 | Stop reason: HOSPADM

## 2023-06-19 RX ORDER — SODIUM CHLORIDE 9 MG/ML
INJECTION, SOLUTION INTRAVENOUS CONTINUOUS
Status: DISCONTINUED | OUTPATIENT
Start: 2023-06-20 | End: 2023-06-21 | Stop reason: HOSPADM

## 2023-06-19 RX ORDER — AMLODIPINE BESYLATE 5 MG/1
5 TABLET ORAL DAILY
Status: DISCONTINUED | OUTPATIENT
Start: 2023-06-19 | End: 2023-06-19 | Stop reason: HOSPADM

## 2023-06-19 RX ORDER — METOPROLOL TARTRATE 50 MG/1
50 TABLET ORAL DAILY
Status: DISCONTINUED | OUTPATIENT
Start: 2023-06-19 | End: 2023-06-19 | Stop reason: HOSPADM

## 2023-06-19 RX ORDER — LEVOTHYROXINE SODIUM 112 UG/1
112 TABLET ORAL
Status: DISCONTINUED | OUTPATIENT
Start: 2023-06-22 | End: 2023-06-19 | Stop reason: HOSPADM

## 2023-06-19 RX ORDER — VANCOMYCIN HYDROCHLORIDE 1 G/20ML
INJECTION, POWDER, LYOPHILIZED, FOR SOLUTION INTRAVENOUS
Status: DISCONTINUED | OUTPATIENT
Start: 2023-06-19 | End: 2023-06-19 | Stop reason: HOSPADM

## 2023-06-19 RX ORDER — CHOLECALCIFEROL (VITAMIN D3) 25 MCG
1000 TABLET ORAL DAILY
Status: DISCONTINUED | OUTPATIENT
Start: 2023-06-19 | End: 2023-06-19

## 2023-06-19 RX ORDER — GABAPENTIN 300 MG/1
300 CAPSULE ORAL 2 TIMES DAILY
Status: DISCONTINUED | OUTPATIENT
Start: 2023-06-20 | End: 2023-06-19 | Stop reason: HOSPADM

## 2023-06-19 RX ORDER — ESCITALOPRAM OXALATE 10 MG/1
20 TABLET ORAL DAILY
Status: DISCONTINUED | OUTPATIENT
Start: 2023-06-19 | End: 2023-06-19

## 2023-06-19 RX ORDER — ESCITALOPRAM OXALATE 10 MG/1
20 TABLET ORAL DAILY
Status: DISCONTINUED | OUTPATIENT
Start: 2023-06-19 | End: 2023-06-19 | Stop reason: HOSPADM

## 2023-06-19 RX ORDER — BUPROPION HYDROCHLORIDE 150 MG/1
300 TABLET ORAL DAILY
Status: DISCONTINUED | OUTPATIENT
Start: 2023-06-19 | End: 2023-06-19

## 2023-06-19 RX ORDER — FLUTICASONE PROPIONATE 50 MCG
1 SPRAY, SUSPENSION (ML) NASAL DAILY
Status: DISCONTINUED | OUTPATIENT
Start: 2023-06-19 | End: 2023-06-19

## 2023-06-19 RX ORDER — FOLIC ACID 0.4 MG
400 TABLET ORAL DAILY
Status: DISCONTINUED | OUTPATIENT
Start: 2023-06-20 | End: 2023-06-19 | Stop reason: HOSPADM

## 2023-06-19 RX ORDER — ONDANSETRON 2 MG/ML
4 INJECTION INTRAMUSCULAR; INTRAVENOUS EVERY 6 HOURS PRN
Status: DISCONTINUED | OUTPATIENT
Start: 2023-06-19 | End: 2023-06-21 | Stop reason: HOSPADM

## 2023-06-19 RX ORDER — LIDOCAINE HYDROCHLORIDE 10 MG/ML
INJECTION INFILTRATION; PERINEURAL
Status: DISCONTINUED | OUTPATIENT
Start: 2023-06-19 | End: 2023-06-19 | Stop reason: HOSPADM

## 2023-06-19 RX ORDER — ACETAMINOPHEN 500 MG
500 TABLET ORAL EVERY 6 HOURS
Status: DISCONTINUED | OUTPATIENT
Start: 2023-06-19 | End: 2023-06-21 | Stop reason: HOSPADM

## 2023-06-19 RX ORDER — SODIUM CHLORIDE 0.9 % (FLUSH) 0.9 %
3 SYRINGE (ML) INJECTION EVERY 6 HOURS PRN
Status: DISCONTINUED | OUTPATIENT
Start: 2023-06-19 | End: 2023-06-21 | Stop reason: HOSPADM

## 2023-06-19 RX ORDER — LEVOTHYROXINE SODIUM 50 UG/1
100 TABLET ORAL
Status: DISCONTINUED | OUTPATIENT
Start: 2023-06-23 | End: 2023-06-19 | Stop reason: HOSPADM

## 2023-06-19 RX ORDER — LEVOTHYROXINE SODIUM 100 UG/1
100 TABLET ORAL DAILY
Status: DISCONTINUED | OUTPATIENT
Start: 2023-06-19 | End: 2023-06-19

## 2023-06-19 RX ORDER — FLUTICASONE PROPIONATE 50 MCG
1 SPRAY, SUSPENSION (ML) NASAL DAILY
Status: DISCONTINUED | OUTPATIENT
Start: 2023-06-20 | End: 2023-06-19 | Stop reason: HOSPADM

## 2023-06-19 RX ORDER — AMLODIPINE BESYLATE 5 MG/1
5 TABLET ORAL DAILY
Status: DISCONTINUED | OUTPATIENT
Start: 2023-06-19 | End: 2023-06-19

## 2023-06-19 RX ORDER — LEVOTHYROXINE SODIUM 100 UG/1
100 TABLET ORAL
Status: DISCONTINUED | OUTPATIENT
Start: 2023-06-19 | End: 2023-06-21 | Stop reason: HOSPADM

## 2023-06-19 RX ORDER — IBUPROFEN 400 MG/1
400 TABLET ORAL EVERY 6 HOURS PRN
Status: DISCONTINUED | OUTPATIENT
Start: 2023-06-19 | End: 2023-06-21 | Stop reason: HOSPADM

## 2023-06-19 RX ADMIN — OXYCODONE HYDROCHLORIDE 5 MG: 5 TABLET ORAL at 11:06

## 2023-06-19 RX ADMIN — SODIUM CHLORIDE: 9 INJECTION, SOLUTION INTRAVENOUS at 05:06

## 2023-06-19 RX ADMIN — OXYCODONE HYDROCHLORIDE 5 MG: 5 TABLET ORAL at 03:06

## 2023-06-19 RX ADMIN — ACETAMINOPHEN 500 MG: 500 TABLET, FILM COATED ORAL at 11:06

## 2023-06-19 RX ADMIN — METHOCARBAMOL 500 MG: 500 TABLET ORAL at 05:06

## 2023-06-19 RX ADMIN — IBUPROFEN 400 MG: 400 TABLET, FILM COATED ORAL at 02:06

## 2023-06-19 RX ADMIN — METHOCARBAMOL 500 MG: 500 TABLET ORAL at 10:06

## 2023-06-19 RX ADMIN — ACETAMINOPHEN 500 MG: 500 TABLET, FILM COATED ORAL at 05:06

## 2023-06-19 NOTE — Clinical Note
The site was marked. The right back was prepped. The site was prepped with ChloraPrep. The patient was draped. The patient was positioned prone. The patient was secured using safety straps.

## 2023-06-19 NOTE — PROGRESS NOTES
Patient resting well  On room air Instructed incentive spirometer, self administer wit good technique , met goal,

## 2023-06-19 NOTE — PROCEDURES
Radiology Post-Procedure Note    Pre Op Diagnosis: R nephrolithiasis  Post Op Diagnosis: Same    Procedure: PCNL access, cyst aspiration    Procedure performed by: Jesus Reeves MD    Written Informed Consent Obtained: Yes  Specimen Removed: YES 50 cc serous fluid  Estimated Blood Loss: Minimal    Findings:   R nephroureterostomy placement and R renal cyst aspiration.    Patient tolerated procedure well.    Jesus Reeves MD

## 2023-06-19 NOTE — H&P
Subjective:      Eulalia Bermudez is a 67 y.o. female who is being admitted s/p right cyst drainage and PCNL placement by IR 6/19/23. She is scheduled to undergo PCNL 6/20/23.    Previously seen by Dr. Preciado and referred for possible PCNL. She recently presented with  and had CT Urogram for workup that indicated large right renal stones as well as a large rigo-pelvic cyst. Here today to discuss treatment option. No pain. No prior h/o nephrolithiasis.    Hx of HTN, HAYDER/MDD, hypothyroidism, migraines    The following portions of the patient's history were reviewed and updated as appropriate: allergies, current medications, past family history, past medical history, past social history, past surgical history and problem list.    Review of Systems  A comprehensive multipoint review of systems was negative except as otherwise stated in the HPI.    Patient Active Problem List   Diagnosis    Hypothyroidism due to acquired atrophy of thyroid    Hypertension, essential    Migraine without aura and without status migrainosus, not intractable    Moderate major depression    Depression    Hypoactive thyroid    History of renal calculi    Headache, tension-type    Vitamin D deficiency    Anxiety    Seasonal allergic rhinitis    Nephrolithiasis       Objective:   Vitals: Breastfeeding No     Physical Exam   General: alert and oriented, no acute distress  Respiratory: Symmetric expansion, non-labored breathing  Neuro: no gross deficits  Psych: normal judgment and insight, normal mood/affect, and non-anxious    Lab Review   Urinalysis demonstrates positive for leukocytes, red blood cells  Lab Results   Component Value Date    WBC 5.52 06/13/2023    HGB 13.5 06/13/2023    HCT 41.5 06/13/2023     (H) 06/13/2023     06/13/2023     Lab Results   Component Value Date    CREATININE 1.0 06/13/2023    CREATININE 1.04 11/13/2018    BUN 23 06/13/2023       Imaging (all images personally reviewed; agree with report  below)  Results for orders placed during the hospital encounter of 05/04/23    CT Urogram Abd Pelvis W WO    Narrative  EXAMINATION:  CT UROGRAM ABD PELVIS W WO    CLINICAL HISTORY:  microscopic hematuria;Other microscopic hematuria    TECHNIQUE:  Low dose axial, sagittal and coronal reformations were obtained from the lung bases to the pubic symphysis before and following the IV administration of 125 mL of Omnipaque 350.  Timing was optimized for nephrogram and excretory renal phases.    COMPARISON:  Images from a study 07/20/2017    FINDINGS:  Dependent hypoventilatory changes in visualized lung bases slightly more prominent today than on the prior exam.    Left kidney 2 mm punctate left renal stones.  No ureteral dilatation, opaque ureteral stone or ureteral obstruction. Tiny subcentimeter hypodensity suggesting cysts and 16 mm parapelvic cyst.    Right kidney; a couple 2.3 cm stones.  Large, slightly lobular 5.3 cm cyst splaying the calices and with extrinsic compression on the pelvis.  No ureteral dilatation opaque ureteral stone or ureteral obstruction.  There is very slight fat stranding around the cephalad aspect of the cyst and collecting structures at the level of the more cephalad of the renal stones for example axial series 2, images 57-61.  This could be chronic with very slight wall thickening suggested on the prior study of 07/20/2017.  Stone burden is increased in the right kidney is slightly larger today.    Urinary bladder mildly distended at time of the exam and as visualized is unremarkable in appearance    Liver unremarkable appearance.  No calcified stones the gallbladder or CT findings of acute cholecystitis.  No biliary duct dilatation.  Spleen not enlarged.  Adrenal glands unremarkable appearance.  Pancreas unremarkable appearance.  The abdominal aorta tapers without aneurysmal dilatation    Stomach, bowel, mesentery; small hiatal hernia.  Diverticulosis without CT findings of acute  diverticulitis.  Normal appearing appendix.  No appreciable bowel wall thickening or inflammatory change.  No free intraperitoneal air or fluid    Reproductive organs; uterine calcifications consistent with calcified uterine fibroid measuring approximately 2.6 cm and smaller calcifications suggesting additional smaller fibroids.  These are in the fundus of the uterus.  Also in the lower uterine segment or cervical region is questionable 2.5 cm mass.  Suggest follow-up pelvic ultrasound    Adnexal region unremarkable appearance    Osseous structures show degenerative changes.  Scoliosis.  Disc space narrowing and disc desiccation in the spine    Impression  Small left renal stones.  16 mm parapelvic left renal cyst and small subcentimeter cysts.  No hydronephrosis, opaque ureteral stone or ureteral obstruction on the left    Right renal stones with right renal stone burden increased compared to prior exam, large right renal cyst increased in size with mass effect on the right renal collecting structures.  Slight fat stranding around the upper pole stone and calyx.    Calcified uterine fibroid.  Additional uterine mass at the level of the lower uterine segment or cervix may also be fibroid but suggest further evaluation follow-up pelvic ultrasound.    Additional findings as detailed above including diverticulosis without CT findings of acute diverticulitis.  Small hiatal hernia.      Electronically signed by: Arline Hartley MD  Date:    05/04/2023  Time:    15:24     Assessment and Plan:   1. Nephrolithiasis  2. Renal cyst  -- Reviewed imaging with patient and . Discussed large stones as well as large rigo-pelvic cyst, which is likely to cause difficulty accessing stones for any endoscopic treatment.  -- S/p right peripelvic cyst aspiration and PCNT placement by IR 6/19    Interval:  - Admit to urology service overnight  - Regular diet; NPO midnight  - Home meds  - Strict I&Os  - AM Labs  - Tylenol ordered  scheduled and ibuprofen prn

## 2023-06-19 NOTE — Clinical Note
A pre-sedation assessment was completed by the physician immediately prior to sedation start.  [>50% of Time Spent on Counseling and Coordination of Care for  ___] : Greater than 50% of the encounter time was spent on counseling and coordination of care for [unfilled] [Time Spent: ___ minutes] : I have spent [unfilled] minutes of face to face time with the patient

## 2023-06-19 NOTE — H&P
Consult/H&P Note  Interventional Radiology    Consult Requested By: Urology    Reason for Consult: R nephrolithiasis    SUBJECTIVE:     Chief Complaint: nephrolithiasis    History of Present Illness: 66 yo F with R peripelvic renal cyst and nephrolithiasis    Past Medical History:   Diagnosis Date    Arthritis     Back pain     Depression     Hypertension     Hypothyroidism     Kidney stones     MVP (mitral valve prolapse)     hx - palpitations     Past Surgical History:   Procedure Laterality Date     SECTION       Family History   Problem Relation Age of Onset    Cancer Mother     COPD Mother     Heart disease Mother     Hypertension Mother     Depression Mother     Hearing loss Mother     Emphysema Father     Breast cancer Paternal Aunt      Social History     Tobacco Use    Smoking status: Never    Smokeless tobacco: Never   Substance Use Topics    Alcohol use: Yes     Alcohol/week: 2.0 standard drinks     Types: 2 Glasses of wine per week     Comment: nightly    Drug use: No       Review of Systems:  Constitutional/General:No fever, chills, change in appetite or weight loss.  Hematological/Immuno: no known coagulopathies  Respiratory: no shortness of breath  Cardiovascular: no chest pain  Gastrointestinal: no abdominal pain  Genito-Urinary: positive for - hematuria  Musculoskeletal: negative  Skin: Negative for rash, itching, pigmentation changes, nail or hair changes.  Neurological: no TIA or stroke symptoms  Psychiatric: normal mood/affect, good insight/judgement      OBJECTIVE:     Vital Signs Range (Last 24H):  Temp:  [98.1 °F (36.7 °C)]   Pulse:  [67]   Resp:  [18]   BP: (164)/(72)   SpO2:  [96 %]     Physical Exam:  General- Patient alert and oriented x3 in NAD  ENT- PERRLA,  Neck- No masses  CV- Regular rate and rhythm  Resp-  No increased WOB  GI- Non tender/non-distended  Extrem- No cyanosis, clubbing, edema.   Derm- No rashes, masses, or lesions noted  Neuro-  No focal deficits noted.      Physical Exam  There is no height or weight on file to calculate BMI.    Scheduled Meds:    acetaminophen  500 mg Oral Q6H    amLODIPine  5 mg Oral Daily    [START ON 6/20/2023] buPROPion  300 mg Oral Daily    EScitalopram oxalate  20 mg Oral Daily    [START ON 6/20/2023] fluticasone propionate  1 spray Each Nostril Daily    [START ON 6/20/2023] folic acid  400 mcg Oral Daily    [START ON 6/20/2023] gabapentin  300 mg Oral BID    levothyroxine  100 mcg Oral Daily    [START ON 6/21/2023] levothyroxine  100 mcg Oral Every Wed    [START ON 6/23/2023] levothyroxine  100 mcg Oral Every Fri    [START ON 6/20/2023] levothyroxine  112 mcg Oral Every Tues    [START ON 6/22/2023] levothyroxine  112 mcg Oral Every Thurs    metoprolol tartrate  50 mg Oral Daily    [START ON 6/20/2023] vitamin D  1,000 Units Oral Daily     Continuous Infusions:   PRN Meds:ibuprofen, ondansetron, sodium chloride 0.9%    Allergies:   Review of patient's allergies indicates:   Allergen Reactions    Imitrex [sumatriptan] Anaphylaxis    Methylprednisolone Other (See Comments)     Flush and hot sweats    Egg Nausea Only    Penicillins Rash    Sulfa (sulfonamide antibiotics) Rash       Labs:  No results for input(s): INR in the last 168 hours.    Invalid input(s):  PT,  PTT    Recent Labs   Lab 06/13/23  1128   WBC 5.52   HGB 13.5   HCT 41.5   *         Recent Labs   Lab 06/13/23  1128   GLU 85      K 4.8      CO2 25   BUN 23   CREATININE 1.0   CALCIUM 9.1       Vitals (Most Recent):  Temp: 98.1 °F (36.7 °C) (06/19/23 1139)  Pulse: 67 (06/19/23 1139)  Resp: 18 (06/19/23 1139)  BP: (!) 164/72 (06/19/23 1139)  SpO2: 96 % (06/19/23 1139)    ASA: 2  Mallampati: 2    Consent obtained    ASSESSMENT/PLAN:     R PCNL access and cyst aspiration.  Moderate sedation.    Active Hospital Problems    Diagnosis  POA    *Nephrolithiasis [N20.0]  Yes      Resolved Hospital Problems   No resolved problems to display.           Jesus ESTRADA  MD Leandro

## 2023-06-19 NOTE — Clinical Note
The right back, lower back and right flank was prepped. The site was prepped with ChloraPrep. The patient was draped. The patient was positioned prone. The patient was secured using safety straps.

## 2023-06-19 NOTE — DISCHARGE SUMMARY
Radiology Discharge Summary      Hospital Course: No complications    Admit Date: 6/19/2023  Discharge Date: 06/19/2023     Instructions Given to Patient: Yes  Diet: Resume prior diet  Activity: activity as tolerated and no driving for today    Description of Condition on Discharge: Stable  Vital Signs (Most Recent): Temp: 98.1 °F (36.7 °C) (06/19/23 1139)  Pulse: 67 (06/19/23 1139)  Resp: 18 (06/19/23 1139)  BP: (!) 164/72 (06/19/23 1139)  SpO2: 96 % (06/19/23 1139)    Discharge Disposition:  observation, PCNL tomorrow    Discharge Diagnosis: R nephrolithiasis     Follow-up: per Urology    Jesus Reeves MD

## 2023-06-20 ENCOUNTER — ANESTHESIA (OUTPATIENT)
Dept: SURGERY | Facility: OTHER | Age: 68
End: 2023-06-20
Payer: MEDICARE

## 2023-06-20 LAB
ANION GAP SERPL CALC-SCNC: 11 MMOL/L (ref 8–16)
BASOPHILS # BLD AUTO: 0.03 K/UL (ref 0–0.2)
BASOPHILS # BLD AUTO: 0.04 K/UL (ref 0–0.2)
BASOPHILS NFR BLD: 0.4 % (ref 0–1.9)
BASOPHILS NFR BLD: 0.6 % (ref 0–1.9)
BUN SERPL-MCNC: 14 MG/DL (ref 8–23)
CALCIUM SERPL-MCNC: 8.5 MG/DL (ref 8.7–10.5)
CHLORIDE SERPL-SCNC: 107 MMOL/L (ref 95–110)
CO2 SERPL-SCNC: 21 MMOL/L (ref 23–29)
CREAT SERPL-MCNC: 0.8 MG/DL (ref 0.5–1.4)
DIFFERENTIAL METHOD: ABNORMAL
DIFFERENTIAL METHOD: ABNORMAL
EOSINOPHIL # BLD AUTO: 0 K/UL (ref 0–0.5)
EOSINOPHIL # BLD AUTO: 0.1 K/UL (ref 0–0.5)
EOSINOPHIL NFR BLD: 0 % (ref 0–8)
EOSINOPHIL NFR BLD: 0.8 % (ref 0–8)
ERYTHROCYTE [DISTWIDTH] IN BLOOD BY AUTOMATED COUNT: 12.4 % (ref 11.5–14.5)
ERYTHROCYTE [DISTWIDTH] IN BLOOD BY AUTOMATED COUNT: 12.7 % (ref 11.5–14.5)
EST. GFR  (NO RACE VARIABLE): >60 ML/MIN/1.73 M^2
GLUCOSE SERPL-MCNC: 111 MG/DL (ref 70–110)
HCT VFR BLD AUTO: 36.6 % (ref 37–48.5)
HCT VFR BLD AUTO: 40.2 % (ref 37–48.5)
HGB BLD-MCNC: 11.9 G/DL (ref 12–16)
HGB BLD-MCNC: 13.4 G/DL (ref 12–16)
IMM GRANULOCYTES # BLD AUTO: 0.02 K/UL (ref 0–0.04)
IMM GRANULOCYTES # BLD AUTO: 0.03 K/UL (ref 0–0.04)
IMM GRANULOCYTES NFR BLD AUTO: 0.3 % (ref 0–0.5)
IMM GRANULOCYTES NFR BLD AUTO: 0.4 % (ref 0–0.5)
LYMPHOCYTES # BLD AUTO: 0.9 K/UL (ref 1–4.8)
LYMPHOCYTES # BLD AUTO: 1.3 K/UL (ref 1–4.8)
LYMPHOCYTES NFR BLD: 12.2 % (ref 18–48)
LYMPHOCYTES NFR BLD: 19.9 % (ref 18–48)
MCH RBC QN AUTO: 33.9 PG (ref 27–31)
MCH RBC QN AUTO: 34.2 PG (ref 27–31)
MCHC RBC AUTO-ENTMCNC: 32.5 G/DL (ref 32–36)
MCHC RBC AUTO-ENTMCNC: 33.3 G/DL (ref 32–36)
MCV RBC AUTO: 102 FL (ref 82–98)
MCV RBC AUTO: 105 FL (ref 82–98)
MONOCYTES # BLD AUTO: 0.1 K/UL (ref 0.3–1)
MONOCYTES # BLD AUTO: 0.5 K/UL (ref 0.3–1)
MONOCYTES NFR BLD: 1.6 % (ref 4–15)
MONOCYTES NFR BLD: 7.6 % (ref 4–15)
NEUTROPHILS # BLD AUTO: 4.5 K/UL (ref 1.8–7.7)
NEUTROPHILS # BLD AUTO: 6.3 K/UL (ref 1.8–7.7)
NEUTROPHILS NFR BLD: 70.8 % (ref 38–73)
NEUTROPHILS NFR BLD: 85.4 % (ref 38–73)
NRBC BLD-RTO: 0 /100 WBC
NRBC BLD-RTO: 0 /100 WBC
PLATELET # BLD AUTO: 237 K/UL (ref 150–450)
PLATELET # BLD AUTO: 243 K/UL (ref 150–450)
PMV BLD AUTO: 10 FL (ref 9.2–12.9)
PMV BLD AUTO: 10 FL (ref 9.2–12.9)
POTASSIUM SERPL-SCNC: 4 MMOL/L (ref 3.5–5.1)
RBC # BLD AUTO: 3.48 M/UL (ref 4–5.4)
RBC # BLD AUTO: 3.95 M/UL (ref 4–5.4)
SODIUM SERPL-SCNC: 139 MMOL/L (ref 136–145)
WBC # BLD AUTO: 6.42 K/UL (ref 3.9–12.7)
WBC # BLD AUTO: 7.4 K/UL (ref 3.9–12.7)

## 2023-06-20 PROCEDURE — 25000003 PHARM REV CODE 250: Performed by: UROLOGY

## 2023-06-20 PROCEDURE — 36415 COLL VENOUS BLD VENIPUNCTURE: CPT | Performed by: STUDENT IN AN ORGANIZED HEALTH CARE EDUCATION/TRAINING PROGRAM

## 2023-06-20 PROCEDURE — 85025 COMPLETE CBC W/AUTO DIFF WBC: CPT | Performed by: UROLOGY

## 2023-06-20 PROCEDURE — 36415 COLL VENOUS BLD VENIPUNCTURE: CPT | Performed by: UROLOGY

## 2023-06-20 PROCEDURE — 25000003 PHARM REV CODE 250: Performed by: RADIOLOGY

## 2023-06-20 PROCEDURE — 25000003 PHARM REV CODE 250: Performed by: STUDENT IN AN ORGANIZED HEALTH CARE EDUCATION/TRAINING PROGRAM

## 2023-06-20 PROCEDURE — C1758 CATHETER, URETERAL: HCPCS | Performed by: UROLOGY

## 2023-06-20 PROCEDURE — G0378 HOSPITAL OBSERVATION PER HR: HCPCS

## 2023-06-20 PROCEDURE — D9220A PRA ANESTHESIA: Mod: ANES,,, | Performed by: ANESTHESIOLOGY

## 2023-06-20 PROCEDURE — C1769 GUIDE WIRE: HCPCS | Performed by: UROLOGY

## 2023-06-20 PROCEDURE — 37000008 HC ANESTHESIA 1ST 15 MINUTES: Performed by: UROLOGY

## 2023-06-20 PROCEDURE — 50081 PR REMV KID STONE, 2+ CM, PERC: ICD-10-PCS | Mod: RT,,, | Performed by: UROLOGY

## 2023-06-20 PROCEDURE — 96376 TX/PRO/DX INJ SAME DRUG ADON: CPT | Mod: 59

## 2023-06-20 PROCEDURE — 36000708 HC OR TIME LEV III 1ST 15 MIN: Performed by: UROLOGY

## 2023-06-20 PROCEDURE — 50081 PERQ NL/PL LITHOTRP CPLX>2CM: CPT | Mod: RT,,, | Performed by: UROLOGY

## 2023-06-20 PROCEDURE — 63600175 PHARM REV CODE 636 W HCPCS: Performed by: STUDENT IN AN ORGANIZED HEALTH CARE EDUCATION/TRAINING PROGRAM

## 2023-06-20 PROCEDURE — D9220A PRA ANESTHESIA: ICD-10-PCS | Mod: CRNA,,, | Performed by: STUDENT IN AN ORGANIZED HEALTH CARE EDUCATION/TRAINING PROGRAM

## 2023-06-20 PROCEDURE — 25500020 PHARM REV CODE 255: Performed by: UROLOGY

## 2023-06-20 PROCEDURE — D9220A PRA ANESTHESIA: ICD-10-PCS | Mod: ANES,,, | Performed by: ANESTHESIOLOGY

## 2023-06-20 PROCEDURE — 94761 N-INVAS EAR/PLS OXIMETRY MLT: CPT

## 2023-06-20 PROCEDURE — 80048 BASIC METABOLIC PNL TOTAL CA: CPT | Performed by: STUDENT IN AN ORGANIZED HEALTH CARE EDUCATION/TRAINING PROGRAM

## 2023-06-20 PROCEDURE — 63600175 PHARM REV CODE 636 W HCPCS

## 2023-06-20 PROCEDURE — 37000009 HC ANESTHESIA EA ADD 15 MINS: Performed by: UROLOGY

## 2023-06-20 PROCEDURE — 25000003 PHARM REV CODE 250

## 2023-06-20 PROCEDURE — 63600175 PHARM REV CODE 636 W HCPCS: Performed by: ANESTHESIOLOGY

## 2023-06-20 PROCEDURE — C1894 INTRO/SHEATH, NON-LASER: HCPCS | Performed by: UROLOGY

## 2023-06-20 PROCEDURE — 96374 THER/PROPH/DIAG INJ IV PUSH: CPT | Mod: 59

## 2023-06-20 PROCEDURE — 71000033 HC RECOVERY, INTIAL HOUR: Performed by: UROLOGY

## 2023-06-20 PROCEDURE — D9220A PRA ANESTHESIA: Mod: CRNA,,, | Performed by: STUDENT IN AN ORGANIZED HEALTH CARE EDUCATION/TRAINING PROGRAM

## 2023-06-20 PROCEDURE — 36000709 HC OR TIME LEV III EA ADD 15 MIN: Performed by: UROLOGY

## 2023-06-20 PROCEDURE — 85025 COMPLETE CBC W/AUTO DIFF WBC: CPT | Mod: 91 | Performed by: STUDENT IN AN ORGANIZED HEALTH CARE EDUCATION/TRAINING PROGRAM

## 2023-06-20 PROCEDURE — 96361 HYDRATE IV INFUSION ADD-ON: CPT

## 2023-06-20 PROCEDURE — 82365 CALCULUS SPECTROSCOPY: CPT | Performed by: UROLOGY

## 2023-06-20 PROCEDURE — 25000003 PHARM REV CODE 250: Performed by: ANESTHESIOLOGY

## 2023-06-20 PROCEDURE — 27201423 OPTIME MED/SURG SUP & DEVICES STERILE SUPPLY: Performed by: UROLOGY

## 2023-06-20 RX ORDER — SODIUM CHLORIDE 0.9 % (FLUSH) 0.9 %
3 SYRINGE (ML) INJECTION
Status: DISCONTINUED | OUTPATIENT
Start: 2023-06-20 | End: 2023-06-20

## 2023-06-20 RX ORDER — HYDROMORPHONE HYDROCHLORIDE 2 MG/ML
0.4 INJECTION, SOLUTION INTRAMUSCULAR; INTRAVENOUS; SUBCUTANEOUS EVERY 5 MIN PRN
Status: DISCONTINUED | OUTPATIENT
Start: 2023-06-20 | End: 2023-06-20

## 2023-06-20 RX ORDER — LIDOCAINE HYDROCHLORIDE 20 MG/ML
INJECTION INTRAVENOUS
Status: DISCONTINUED | OUTPATIENT
Start: 2023-06-20 | End: 2023-06-20

## 2023-06-20 RX ORDER — EPHEDRINE SULFATE 50 MG/ML
INJECTION, SOLUTION INTRAVENOUS
Status: DISCONTINUED | OUTPATIENT
Start: 2023-06-20 | End: 2023-06-20

## 2023-06-20 RX ORDER — FENTANYL CITRATE 50 UG/ML
INJECTION, SOLUTION INTRAMUSCULAR; INTRAVENOUS
Status: DISCONTINUED | OUTPATIENT
Start: 2023-06-20 | End: 2023-06-20

## 2023-06-20 RX ORDER — SODIUM CHLORIDE, SODIUM LACTATE, POTASSIUM CHLORIDE, CALCIUM CHLORIDE 600; 310; 30; 20 MG/100ML; MG/100ML; MG/100ML; MG/100ML
INJECTION, SOLUTION INTRAVENOUS CONTINUOUS
Status: DISCONTINUED | OUTPATIENT
Start: 2023-06-20 | End: 2023-06-21 | Stop reason: HOSPADM

## 2023-06-20 RX ORDER — DEXAMETHASONE SODIUM PHOSPHATE 4 MG/ML
INJECTION, SOLUTION INTRA-ARTICULAR; INTRALESIONAL; INTRAMUSCULAR; INTRAVENOUS; SOFT TISSUE
Status: DISCONTINUED | OUTPATIENT
Start: 2023-06-20 | End: 2023-06-20

## 2023-06-20 RX ORDER — CIPROFLOXACIN 2 MG/ML
INJECTION, SOLUTION INTRAVENOUS
Status: DISCONTINUED | OUTPATIENT
Start: 2023-06-20 | End: 2023-06-20

## 2023-06-20 RX ORDER — PROPOFOL 10 MG/ML
VIAL (ML) INTRAVENOUS
Status: DISCONTINUED | OUTPATIENT
Start: 2023-06-20 | End: 2023-06-20

## 2023-06-20 RX ORDER — ROCURONIUM BROMIDE 10 MG/ML
INJECTION, SOLUTION INTRAVENOUS
Status: DISCONTINUED | OUTPATIENT
Start: 2023-06-20 | End: 2023-06-20

## 2023-06-20 RX ORDER — HYDROCODONE BITARTRATE AND ACETAMINOPHEN 5; 325 MG/1; MG/1
1 TABLET ORAL EVERY 4 HOURS PRN
Status: DISCONTINUED | OUTPATIENT
Start: 2023-06-20 | End: 2023-06-21 | Stop reason: HOSPADM

## 2023-06-20 RX ORDER — OXYCODONE HYDROCHLORIDE 5 MG/1
5 TABLET ORAL
Status: DISCONTINUED | OUTPATIENT
Start: 2023-06-20 | End: 2023-06-20

## 2023-06-20 RX ORDER — LIDOCAINE HYDROCHLORIDE 10 MG/ML
0.5 INJECTION, SOLUTION EPIDURAL; INFILTRATION; INTRACAUDAL; PERINEURAL ONCE
Status: DISCONTINUED | OUTPATIENT
Start: 2023-06-20 | End: 2023-06-21 | Stop reason: HOSPADM

## 2023-06-20 RX ORDER — PROCHLORPERAZINE EDISYLATE 5 MG/ML
5 INJECTION INTRAMUSCULAR; INTRAVENOUS EVERY 30 MIN PRN
Status: DISCONTINUED | OUTPATIENT
Start: 2023-06-20 | End: 2023-06-20

## 2023-06-20 RX ORDER — DIPHENHYDRAMINE HYDROCHLORIDE 50 MG/ML
12.5 INJECTION INTRAMUSCULAR; INTRAVENOUS EVERY 30 MIN PRN
Status: DISCONTINUED | OUTPATIENT
Start: 2023-06-20 | End: 2023-06-20

## 2023-06-20 RX ADMIN — OXYCODONE HYDROCHLORIDE 5 MG: 5 TABLET ORAL at 05:06

## 2023-06-20 RX ADMIN — EPHEDRINE SULFATE 5 MG: 50 INJECTION INTRAVENOUS at 08:06

## 2023-06-20 RX ADMIN — METHOCARBAMOL 500 MG: 500 TABLET ORAL at 08:06

## 2023-06-20 RX ADMIN — HYDROCODONE BITARTRATE AND ACETAMINOPHEN 1 TABLET: 5; 325 TABLET ORAL at 08:06

## 2023-06-20 RX ADMIN — FENTANYL CITRATE 100 MCG: 50 INJECTION, SOLUTION INTRAMUSCULAR; INTRAVENOUS at 07:06

## 2023-06-20 RX ADMIN — ACETAMINOPHEN 500 MG: 500 TABLET, FILM COATED ORAL at 05:06

## 2023-06-20 RX ADMIN — FENTANYL CITRATE 50 MCG: 50 INJECTION, SOLUTION INTRAMUSCULAR; INTRAVENOUS at 08:06

## 2023-06-20 RX ADMIN — ROCURONIUM BROMIDE 10 MG: 10 SOLUTION INTRAVENOUS at 09:06

## 2023-06-20 RX ADMIN — ROCURONIUM BROMIDE 20 MG: 10 SOLUTION INTRAVENOUS at 08:06

## 2023-06-20 RX ADMIN — EPHEDRINE SULFATE 10 MG: 50 INJECTION INTRAVENOUS at 10:06

## 2023-06-20 RX ADMIN — OXYCODONE HYDROCHLORIDE 5 MG: 5 TABLET ORAL at 11:06

## 2023-06-20 RX ADMIN — METHOCARBAMOL 500 MG: 500 TABLET ORAL at 12:06

## 2023-06-20 RX ADMIN — SODIUM CHLORIDE, SODIUM LACTATE, POTASSIUM CHLORIDE, AND CALCIUM CHLORIDE: .6; .31; .03; .02 INJECTION, SOLUTION INTRAVENOUS at 09:06

## 2023-06-20 RX ADMIN — SODIUM CHLORIDE: 9 INJECTION, SOLUTION INTRAVENOUS at 03:06

## 2023-06-20 RX ADMIN — PROPOFOL 150 MG: 10 INJECTION, EMULSION INTRAVENOUS at 07:06

## 2023-06-20 RX ADMIN — SUGAMMADEX 200 MG: 100 INJECTION, SOLUTION INTRAVENOUS at 10:06

## 2023-06-20 RX ADMIN — ONDANSETRON HYDROCHLORIDE 4 MG: 2 INJECTION INTRAMUSCULAR; INTRAVENOUS at 10:06

## 2023-06-20 RX ADMIN — ROCURONIUM BROMIDE 50 MG: 10 SOLUTION INTRAVENOUS at 07:06

## 2023-06-20 RX ADMIN — METHOCARBAMOL 500 MG: 500 TABLET ORAL at 06:06

## 2023-06-20 RX ADMIN — ACETAMINOPHEN 500 MG: 500 TABLET, FILM COATED ORAL at 12:06

## 2023-06-20 RX ADMIN — HYDROCODONE BITARTRATE AND ACETAMINOPHEN 1 TABLET: 5; 325 TABLET ORAL at 03:06

## 2023-06-20 RX ADMIN — DEXAMETHASONE SODIUM PHOSPHATE 8 MG: 4 INJECTION, SOLUTION INTRAMUSCULAR; INTRAVENOUS at 08:06

## 2023-06-20 RX ADMIN — ONDANSETRON HYDROCHLORIDE 4 MG: 2 INJECTION INTRAMUSCULAR; INTRAVENOUS at 07:06

## 2023-06-20 RX ADMIN — ESMOLOL HYDROCHLORIDE 20 MG: 100 INJECTION, SOLUTION INTRAVENOUS at 10:06

## 2023-06-20 RX ADMIN — SODIUM CHLORIDE, SODIUM LACTATE, POTASSIUM CHLORIDE, AND CALCIUM CHLORIDE: .6; .31; .03; .02 INJECTION, SOLUTION INTRAVENOUS at 06:06

## 2023-06-20 RX ADMIN — ACETAMINOPHEN 500 MG: 500 TABLET, FILM COATED ORAL at 06:06

## 2023-06-20 RX ADMIN — LIDOCAINE HYDROCHLORIDE 60 MG: 20 INJECTION, SOLUTION INTRAVENOUS at 07:06

## 2023-06-20 RX ADMIN — EPHEDRINE SULFATE 15 MG: 50 INJECTION INTRAVENOUS at 07:06

## 2023-06-20 RX ADMIN — CIPROFLOXACIN 400 MG: 2 INJECTION, SOLUTION INTRAVENOUS at 08:06

## 2023-06-20 NOTE — ANESTHESIA PROCEDURE NOTES
Intubation    Date/Time: 6/20/2023 7:45 AM  Performed by: Anna De León  Authorized by: Karlo Gee MD     Intubation:     Induction:  Intravenous    Intubated:  Postinduction    Mask Ventilation:  Easy mask    Attempts:  1    Attempted By:  Student    Method of Intubation:  Video laryngoscopy    Blade:  Luther 3    Laryngeal View Grade: Grade I - full view of cords      Difficult Airway Encountered?: No      Complications:  None    Airway Device:  Oral endotracheal tube    Airway Device Size:  7.0    Style/Cuff Inflation:  Cuffed (inflated to minimal occlusive pressure)    Tube secured:  21    Secured at:  The lips    Placement Verified By:  Capnometry    Complicating Factors:  None    Findings Post-Intubation:  BS equal bilateral

## 2023-06-20 NOTE — ANESTHESIA POSTPROCEDURE EVALUATION
Anesthesia Post Evaluation    Patient: Eulalia Bermudez    Procedure(s) Performed: Procedure(s) (LRB):  NEPHROLITHOTOMY, PERCUTANEOUS (Right)    Final Anesthesia Type: general      Patient location during evaluation: PACU  Patient participation: Yes- Able to Participate  Level of consciousness: awake and alert  Post-procedure vital signs: reviewed and stable  Pain management: adequate  Airway patency: patent    PONV status at discharge: No PONV  Anesthetic complications: no      Cardiovascular status: blood pressure returned to baseline  Respiratory status: spontaneous ventilation  Hydration status: euvolemic  Follow-up not needed.          Vitals Value Taken Time   /58 06/20/23 1153   Temp 36.7 °C (98 °F) 06/20/23 1153   Pulse 93 06/20/23 1153   Resp 18 06/20/23 1153   SpO2 93 % 06/20/23 1153         Event Time   Out of Recovery 06/20/2023 11:35:00         Pain/Errol Score: Pain Rating Prior to Med Admin: 0 (6/20/2023 12:37 PM)  Pain Rating Post Med Admin: 6 (6/19/2023  1:58 PM)  Errol Score: 10 (6/20/2023 11:16 AM)

## 2023-06-20 NOTE — PLAN OF CARE
MOON Message     Medicare Outpatient and Observation Notification regarding financial responsibility Given to patient/caregiver; Explained to patient/caregiver; Signed/date by patient/caregiver   Date HERNANDEZ was signed 6/20/2023   Time HERNANDEZ was signed 1222

## 2023-06-20 NOTE — OP NOTE
[facility] ([address]  Urology Department  Operative Note    Date of Procedure: 6/20/2023     Pre-Operative Diagnosis:   Right renal stone    Post-Operative Diagnosis: same    Procedure(s) Performed:   Right PCNL of complex stone (> 2 cm)  Right nephrostomy tube placement  Right antegrade nephrostogram    Specimen(s): Right renal stone    Staff Surgeon: Bert Anaya MD    Assistant Surgeon: Sampson Ricks MD     Anesthesia: General endotracheal anesthesia    Indications: Eulalia Bermudez is a 67 y.o. female with a right renal stones presenting for definitive management. She has been extensively counseled on the options for stones and understands the risks, benefits, and alternatives. She has elected to proceed with PCNL.     Findings:  2 large stones within renal pelvis, fragmented and extracted. Stones were both 2.3 cm in size.  Right antegrade ureteroscopy without stone in R ureter  Right antegrade pyelogram without filling defects or extravasation    Estimated Blood Loss: 100 ml    Drains:   16 Fr urethral moore catheter  12 Fr right percutaneous nephrostomy tube    Procedure in detail:  After the risks, benefits and possible complications of the procedure were explained, the patient elected to undergo the above procedures and informed consent was obtained. The patient was taken to the OR and placed under general anesthesia. Pre-operative antibiotics were administered. Time out was performed.  SCDs were applied and working prior to the procedure.    Our attention was turned to the patient's right percutaneous nephrostomy tube (PCNT) which was previously placed by interventional radiology. An antegrade nephrostogram was performed using fluoroscopy to delineate the collecting system. A motion wire was inserted through the nephrostomy tube and advanced to the level of the bladder. This was confirmed on fluoroscopy. The nephrostomy tube was then removed, keeping the wire in place. A dual lumen  access catheter was inserted over the wire. A second super stiff wire was then inserted through the second lumen and advanced to the level of the bladder. The dual lumen access catheter was then removed, keeping both wires in place.    A 30 Fr Ultraxx nephrostomy dilator balloon was advanced over the wire and the tip of the balloon was placed into the lower pole calyx, which was confirmed with fluoroscopy. We then dilated the nephrostomy tract under fluoroscopic guidance. The 30 Fr percutaneous renal access sheath was then advanced over the dilator balloon into the renal collecting system. The balloon was deflated and removed, leaving both wires in place.     The nephroscope was advanced into the sheath. The collecting system was visualized. The UPJ was identified and stones were encountered within the collecting system.  2 large stones were noted consistent with pre-op CT scan. These were fragmented and extracted using the Trilogy device. Larger fragments were also removed using the [id number] stone basket. Antegrade flexible pyeloscopy was then performed to evaluate all the calyces. Any visualized residual stone fragments were removed with a basket.  There were no stone fragments identified at the end of flexible pyeloscopy. Antegrade ureteroscopy was also performed with no stones identified within the ureter.    A 12 Fr pig-tail catheter was placed into the collecting system as a nephrostomy tube. Antegrade nephrostogram was performed showing no extravasation of contrast and no filling defects within the collecting system. The pigtail was secured using the attached string.    The sheath was then removed over the catheter, leaving the nephrotomy tube in place. The skin was closed with 3-0 vicryl in a horizontal mattress fashion. The nephrostomy tube was secured to the skin using a 2-0 silk.      A sterile compressive dressing was placed. The patient was then transferred to PACU in stable condition.      Disposition:  The patient will be admitted to the floor for postoperative monitoring overnight.  A CTRSS will be obtained in the AM.    Sampson Ricks MD      Attestation:  I have reviewed the notes, assessments, and/or procedures documented by Sampson Ricks MD and I concur with her/his documentation of Eulalia Bermudez.     I was present and scrubbed for the entire procedure.    Reji Anaya MD

## 2023-06-20 NOTE — TRANSFER OF CARE
Anesthesia Transfer of Care Note    Patient: Eulalia Bermudez    Procedure(s) Performed: Procedure(s) (LRB):  NEPHROLITHOTOMY, PERCUTANEOUS (Right)    Patient location: PACU    Anesthesia Type: general    Transport from OR: Transported from OR on 6-10 L/min O2 by face mask with adequate spontaneous ventilation    Post pain: adequate analgesia    Post assessment: no apparent anesthetic complications    Post vital signs: stable    Level of consciousness: responds to stimulation and sedated    Complications: none    Transfer of care protocol was followed      Last vitals:   Visit Vitals  BP (!) 148/64 (BP Location: Right arm, Patient Position: Lying)   Pulse 74   Temp 37 °C (98.6 °F) (Oral)   Resp 18   SpO2 95%   Breastfeeding No

## 2023-06-20 NOTE — PROGRESS NOTES
[facility] ([address])  Urology  Progress Note    Patient Name: Eulalia Bermudez  MRN: [id number]  Admission Date: 6/19/2023  Hospital Length of Stay: 1 days  Code Status: Full Code   Attending Provider: Bert Anaya MD   Primary Care Physician: Lizy Noble NP    Subjective:     HPI:  Eulalia Bermudez is a 67 y.o. female who is being admitted s/p right cyst drainage and PCNL placement by IR 6/19/23. She is scheduled to undergo PCNL 6/20/23.     Previously seen by Dr. Preciado and referred for possible PCNL. She recently presented with  and had CT Urogram for workup that indicated large right renal stones as well as a large rigo-pelvic cyst. Here today to discuss treatment option. No pain. No prior h/o nephrolithiasis.     Hx of HTN, HAYDER/MDD, hypothyroidism, migraines     The following portions of the patient's history were reviewed and updated as appropriate: allergies, current medications, past family history, past medical history, past social history, past surgical history and problem list.      Interval History: NAEO. AFVSS. Pain better controlled. NPO for surgery.      Objective:     Temp:  [98 °F (36.7 °C)-98.6 °F (37 °C)] 98.6 °F (37 °C)  Pulse:  [67-75] 74  Resp:  [17-18] 18  SpO2:  [93 %-96 %] 95 %  BP: (148-180)/(64-81) 148/64     There is no height or weight on file to calculate BMI.           Drains       None                    Physical Exam  Vitals and nursing note reviewed.   Constitutional:       General: She is not in acute distress.  HENT:      Head: Atraumatic.      Nose: Nose normal.   Eyes:      Extraocular Movements: Extraocular movements intact.   Cardiovascular:      Rate and Rhythm: Normal rate.   Pulmonary:      Effort: Pulmonary effort is normal.   Abdominal:      General: Abdomen is flat. There is no distension.      Tenderness: There is no abdominal tenderness. There is no right CVA tenderness or left CVA tenderness.   Genitourinary:     Comments: R PCNT with intact  dressing, mildly soaked  Musculoskeletal:         General: Normal range of motion.      Cervical back: Normal range of motion.   Skin:     Coloration: Skin is not jaundiced.   Neurological:      General: No focal deficit present.      Mental Status: She is alert and oriented to person, place, and time.   Psychiatric:         Mood and Affect: Mood normal.         Behavior: Behavior normal.         Significant Labs:    BMP:  Recent Labs   Lab 06/13/23  1128 06/20/23  0549    139   K 4.8 4.0    107   CO2 25 21*   BUN 23 14   CREATININE 1.0 0.8   CALCIUM 9.1 8.5*       CBC:   Recent Labs   Lab 06/13/23  1128 06/20/23  0549   WBC 5.52 6.42   HGB 13.5 13.4   HCT 41.5 40.2    237       Blood Culture: No results for input(s): LABBLOO in the last 168 hours.  Urine Culture: No results for input(s): LABURIN in the last 168 hours.  Urine Studies: No results for input(s): COLORU, APPEARANCEUA, PHUR, SPECGRAV, PROTEINUA, GLUCUA, KETONESU, BILIRUBINUA, OCCULTUA, NITRITE, UROBILINOGEN, LEUKOCYTESUR, RBCUA, WBCUA, BACTERIA, SQUAMEPITHEL, HYALINECASTS in the last 168 hours.    Invalid input(s): WRIGHTSUR    Significant Imaging:  All pertinent imaging results/findings from the past 24 hours have been reviewed.        Assessment/Plan:     * Nephrolithiasis  68 yo female with above comorbidities.    - To OR for R PCNL today  - Consent obtained in preop  - Access obtained by IR yesterday 6/19/23, appreciate assistance  - NPO          VTE Risk Mitigation (From admission, onward)         Ordered     IP VTE HIGH RISK PATIENT  Once         06/19/23 1136     Place CASSIUS hose  Until discontinued         06/19/23 1136     Place sequential compression device  Until discontinued         06/19/23 1136                Sampson Ricks MD  Urology  [facility] ([address])

## 2023-06-20 NOTE — PLAN OF CARE
Pt AAOx4. VSS. Poc reviewed with pt and questions answered. Repositions self independently. Good appetite and tolerating diet; NPO diet initiated @ 0000. Voiding adequately up to bathroom independently, clear red urine noted. Small amt of drainage noted to R. Lower back dressing, dressing intact. Pain is managed with prn and scheduled meds with moderate relief. CHG bath and preop checklist completed for upcoming procedure. Remains free from falls, injury, and skin breakdown. All needs and concerns met. Purposeful rounding done. Bed is locked and in lowest position, side rails up x2, call light in reach. Will continue to monitor.

## 2023-06-20 NOTE — SUBJECTIVE & OBJECTIVE
Interval History: NAEO. AFVSS. Pain better controlled. NPO for surgery.      Objective:     Temp:  [98 °F (36.7 °C)-98.6 °F (37 °C)] 98.6 °F (37 °C)  Pulse:  [67-75] 74  Resp:  [17-18] 18  SpO2:  [93 %-96 %] 95 %  BP: (148-180)/(64-81) 148/64     There is no height or weight on file to calculate BMI.           Drains       None                    Physical Exam  Vitals and nursing note reviewed.   Constitutional:       General: She is not in acute distress.  HENT:      Head: Atraumatic.      Nose: Nose normal.   Eyes:      Extraocular Movements: Extraocular movements intact.   Cardiovascular:      Rate and Rhythm: Normal rate.   Pulmonary:      Effort: Pulmonary effort is normal.   Abdominal:      General: Abdomen is flat. There is no distension.      Tenderness: There is no abdominal tenderness. There is no right CVA tenderness or left CVA tenderness.   Genitourinary:     Comments: R PCNT with intact dressing, mildly soaked  Musculoskeletal:         General: Normal range of motion.      Cervical back: Normal range of motion.   Skin:     Coloration: Skin is not jaundiced.   Neurological:      General: No focal deficit present.      Mental Status: She is alert and oriented to person, place, and time.   Psychiatric:         Mood and Affect: Mood normal.         Behavior: Behavior normal.         Significant Labs:    BMP:  Recent Labs   Lab 06/13/23  1128 06/20/23  0549    139   K 4.8 4.0    107   CO2 25 21*   BUN 23 14   CREATININE 1.0 0.8   CALCIUM 9.1 8.5*       CBC:   Recent Labs   Lab 06/13/23  1128 06/20/23  0549   WBC 5.52 6.42   HGB 13.5 13.4   HCT 41.5 40.2    237       Blood Culture: No results for input(s): LABBLOO in the last 168 hours.  Urine Culture: No results for input(s): LABURIN in the last 168 hours.  Urine Studies: No results for input(s): COLORU, APPEARANCEUA, PHUR, SPECGRAV, PROTEINUA, GLUCUA, KETONESU, BILIRUBINUA, OCCULTUA, NITRITE, UROBILINOGEN, LEUKOCYTESUR, RBCUA, WBCUA,  BACTERIA, SQUAMEPITHEL, HYALINECASTS in the last 168 hours.    Invalid input(s): MAL    Significant Imaging:  All pertinent imaging results/findings from the past 24 hours have been reviewed.

## 2023-06-20 NOTE — HPI
Eulalia Bermudez is a 67 y.o. female who is being admitted s/p right cyst drainage and PCNL placement by IR 6/19/23. She is scheduled to undergo PCNL 6/20/23.     Previously seen by Dr. Preciado and referred for possible PCNL. She recently presented with  and had CT Urogram for workup that indicated large right renal stones as well as a large rigo-pelvic cyst. Here today to discuss treatment option. No pain. No prior h/o nephrolithiasis.     Hx of HTN, HAYDER/MDD, hypothyroidism, migraines     The following portions of the patient's history were reviewed and updated as appropriate: allergies, current medications, past family history, past medical history, past social history, past surgical history and problem list.

## 2023-06-20 NOTE — PLAN OF CARE
MSW met with the patient at the bedside. The patient's  Gino is also at the bedside.     Patient is alert and oriented with no communication barriers.     Patient denies having DME at home.       Patients PCP is correct on the face sheet. Patient choice pharmacy is bedside delivery.     Patients' family will transport the patient home at discharge.     CM team will continue to follow.      06/20/23 1970   Discharge Assessment   Assessment Type Discharge Planning Assessment   Confirmed/corrected address, phone number and insurance Yes   Confirmed Demographics Correct on Facesheet   Source of Information patient;family;health record   People in Home significant other   Do you expect to return to your current living situation? Yes   Do you have help at home or someone to help you manage your care at home? Yes   Who are your caregiver(s) and their phone number(s)? Family   Prior to hospitilization cognitive status: Alert/Oriented   Current cognitive status: Alert/Oriented   Equipment Currently Used at Home none   Readmission within 30 days? No   Patient currently being followed by outpatient case management? No   Do you currently have service(s) that help you manage your care at home? No   Do you take prescription medications? Yes   Do you have prescription coverage? Yes   Do you have any problems affording any of your prescribed medications? No   Is the patient taking medications as prescribed? yes   How do you get to doctors appointments? car, drives self;family or friend will provide   Are you on dialysis? No   Do you take coumadin? No   Discharge Plan A Home with family   Discharge Plan B Home Health   DME Needed Upon Discharge  none   Discharge Plan discussed with: Patient   Transition of Care Barriers None

## 2023-06-21 VITALS
OXYGEN SATURATION: 96 % | TEMPERATURE: 98 F | DIASTOLIC BLOOD PRESSURE: 63 MMHG | SYSTOLIC BLOOD PRESSURE: 138 MMHG | RESPIRATION RATE: 18 BRPM | HEART RATE: 84 BPM

## 2023-06-21 LAB
ANION GAP SERPL CALC-SCNC: 7 MMOL/L (ref 8–16)
BASOPHILS # BLD AUTO: 0.03 K/UL (ref 0–0.2)
BASOPHILS NFR BLD: 0.4 % (ref 0–1.9)
BUN SERPL-MCNC: 9 MG/DL (ref 8–23)
CALCIUM SERPL-MCNC: 7.4 MG/DL (ref 8.7–10.5)
CHLORIDE SERPL-SCNC: 112 MMOL/L (ref 95–110)
CO2 SERPL-SCNC: 23 MMOL/L (ref 23–29)
CREAT SERPL-MCNC: 0.8 MG/DL (ref 0.5–1.4)
DIFFERENTIAL METHOD: ABNORMAL
EOSINOPHIL # BLD AUTO: 0.1 K/UL (ref 0–0.5)
EOSINOPHIL NFR BLD: 0.8 % (ref 0–8)
ERYTHROCYTE [DISTWIDTH] IN BLOOD BY AUTOMATED COUNT: 12.9 % (ref 11.5–14.5)
EST. GFR  (NO RACE VARIABLE): >60 ML/MIN/1.73 M^2
GLUCOSE SERPL-MCNC: 111 MG/DL (ref 70–110)
HCT VFR BLD AUTO: 27.5 % (ref 37–48.5)
HGB BLD-MCNC: 8.8 G/DL (ref 12–16)
IMM GRANULOCYTES # BLD AUTO: 0.05 K/UL (ref 0–0.04)
IMM GRANULOCYTES NFR BLD AUTO: 0.7 % (ref 0–0.5)
LYMPHOCYTES # BLD AUTO: 2 K/UL (ref 1–4.8)
LYMPHOCYTES NFR BLD: 27.9 % (ref 18–48)
MCH RBC QN AUTO: 34 PG (ref 27–31)
MCHC RBC AUTO-ENTMCNC: 32 G/DL (ref 32–36)
MCV RBC AUTO: 106 FL (ref 82–98)
MONOCYTES # BLD AUTO: 0.6 K/UL (ref 0.3–1)
MONOCYTES NFR BLD: 8.7 % (ref 4–15)
NEUTROPHILS # BLD AUTO: 4.4 K/UL (ref 1.8–7.7)
NEUTROPHILS NFR BLD: 61.5 % (ref 38–73)
NRBC BLD-RTO: 0 /100 WBC
PLATELET # BLD AUTO: 195 K/UL (ref 150–450)
PMV BLD AUTO: 10.1 FL (ref 9.2–12.9)
POTASSIUM SERPL-SCNC: 3.7 MMOL/L (ref 3.5–5.1)
RBC # BLD AUTO: 2.59 M/UL (ref 4–5.4)
SODIUM SERPL-SCNC: 142 MMOL/L (ref 136–145)
WBC # BLD AUTO: 7.1 K/UL (ref 3.9–12.7)

## 2023-06-21 PROCEDURE — 25000003 PHARM REV CODE 250: Performed by: UROLOGY

## 2023-06-21 PROCEDURE — 94761 N-INVAS EAR/PLS OXIMETRY MLT: CPT

## 2023-06-21 PROCEDURE — 25500020 PHARM REV CODE 255: Performed by: RADIOLOGY

## 2023-06-21 PROCEDURE — 94799 UNLISTED PULMONARY SVC/PX: CPT

## 2023-06-21 PROCEDURE — C1769 GUIDE WIRE: HCPCS | Performed by: RADIOLOGY

## 2023-06-21 PROCEDURE — 25000003 PHARM REV CODE 250: Performed by: RADIOLOGY

## 2023-06-21 PROCEDURE — 80048 BASIC METABOLIC PNL TOTAL CA: CPT | Performed by: UROLOGY

## 2023-06-21 PROCEDURE — G0378 HOSPITAL OBSERVATION PER HR: HCPCS

## 2023-06-21 PROCEDURE — 85025 COMPLETE CBC W/AUTO DIFF WBC: CPT | Performed by: UROLOGY

## 2023-06-21 PROCEDURE — 36415 COLL VENOUS BLD VENIPUNCTURE: CPT | Performed by: UROLOGY

## 2023-06-21 RX ORDER — IODIXANOL 270 MG/ML
INJECTION, SOLUTION INTRAVASCULAR
Status: DISCONTINUED | OUTPATIENT
Start: 2023-06-21 | End: 2023-06-21 | Stop reason: HOSPADM

## 2023-06-21 RX ADMIN — HYDROCODONE BITARTRATE AND ACETAMINOPHEN 1 TABLET: 5; 325 TABLET ORAL at 02:06

## 2023-06-21 RX ADMIN — HYDROCODONE BITARTRATE AND ACETAMINOPHEN 1 TABLET: 5; 325 TABLET ORAL at 04:06

## 2023-06-21 RX ADMIN — HYDROCODONE BITARTRATE AND ACETAMINOPHEN 1 TABLET: 5; 325 TABLET ORAL at 12:06

## 2023-06-21 RX ADMIN — METHOCARBAMOL 500 MG: 500 TABLET ORAL at 02:06

## 2023-06-21 RX ADMIN — LEVOTHYROXINE SODIUM 100 MCG: 100 TABLET ORAL at 05:06

## 2023-06-21 RX ADMIN — ACETAMINOPHEN 500 MG: 500 TABLET, FILM COATED ORAL at 12:06

## 2023-06-21 RX ADMIN — METHOCARBAMOL 500 MG: 500 TABLET ORAL at 09:06

## 2023-06-21 RX ADMIN — HYDROCODONE BITARTRATE AND ACETAMINOPHEN 1 TABLET: 5; 325 TABLET ORAL at 09:06

## 2023-06-21 NOTE — PLAN OF CARE
06/21/23 1313   Final Note   Assessment Type Final Discharge Note   Anticipated Discharge Disposition Home   Hospital Resources/Appts/Education Provided Provided patient/caregiver with written discharge plan information;Appointments scheduled and added to AVS;Appointments scheduled by Navigator/Coordinator   Post-Acute Status   Discharge Delays None known at this time

## 2023-06-21 NOTE — CONSULTS
Interventional Radiology Consult/Pre-Procedure Note      Chief Complaint/Reason for Consult: Nephrostogram    History of Present Illness:  Elualia Bermudez is a 67 y.o. female with the PMH listed below who presents with an indwelling RIGHT neph tube placed after PCNL. IR consulted for nephrostogram and tube removal if no evidence of ureteral obstruction.    Admission H&P reviewed.    Past Medical History:   Diagnosis Date    Arthritis     Back pain     Depression     Hypertension     Hypothyroidism     Kidney stones     MVP (mitral valve prolapse)     hx - palpitations     Past Surgical History:   Procedure Laterality Date     SECTION      DRAINAGE Right 2023    Procedure: Drainage;  Surgeon: Jesus Reeves MD;  Location: [facility] CATH LAB;  Service: Radiology;  Laterality: Right;    PERCUTANEOUS NEPHROLITHOTOMY Right 2023    Procedure: NEPHROLITHOTOMY, PERCUTANEOUS;  Surgeon: Bert Anaya MD;  Location: [facility] OR;  Service: Urology;  Laterality: Right;  Pt will be inpatient. IR will place nephrostomy day prior to surgery.2 HOURS    PERCUTANEOUS NEPHROSTOMY Right 2023    Procedure: Creation, Nephrostomy, Percutaneous;  Surgeon: Jesus Reeves MD;  Location: [facility] CATH LAB;  Service: Radiology;  Laterality: Right;       Allergies:   Review of patient's allergies indicates:   Allergen Reactions    Imitrex [sumatriptan] Anaphylaxis    Methylprednisolone Other (See Comments)     Flush and hot sweats    Egg Nausea Only    Penicillins Rash    Sulfa (sulfonamide antibiotics) Rash       Scheduled Meds:    acetaminophen  500 mg Oral Q6H    levothyroxine  100 mcg Oral Before breakfast    LIDOcaine (PF) 10 mg/ml (1%)  0.5 mL Intradermal Once    methocarbamoL  500 mg Oral QID     Continuous Infusions:    sodium chloride 0.9% 100 mL/hr at 23 0301    lactated ringers       PRN Meds:HYDROcodone-acetaminophen, ibuprofen, ondansetron, oxyCODONE, sodium chloride 0.9%, vancomycin (VANCOCIN)  IVPB    Anticoagulation/Antiplatelet Meds: no anticoagulation    Review of Systems:   As documented in admission H&P.    Physical Exam:  Temp: 98 °F (36.7 °C) (06/21/23 0748)  Pulse: 84 (06/21/23 0748)  Resp: 18 (06/21/23 0927)  BP: 138/63 (06/21/23 0748)  SpO2: 96 % (06/21/23 0853)     General: NAD  HEENT: Normocephalic, sclera anicteric, oropharynx clear  Heart: RRR  Lungs: Symmetric excursions, breathing unlabored  Abd: NTND, soft, RIGHT neph tube in place  Extremities: DOBSON  Neuro: Gross nonfocal    Labs:  No results for input(s): INR in the last 168 hours.    Invalid input(s):  PT,  PTT    Recent Labs   Lab 06/21/23  0424   WBC 7.10   HGB 8.8*   HCT 27.5*   *         Recent Labs   Lab 06/21/23  0424   *      K 3.7   *   CO2 23   BUN 9   CREATININE 0.8   CALCIUM 7.4*       Imaging:  Neph tube placement 6/19/23, images from PCNL 6/20/23, and CT AP 6/21/23 reviewed.    Assessment/Plan:   S/p PCNL. Has indwelling neph tube that is no longer needed. Will undergo nephrostogram and possible removal today.    Sedation: None      Andrew Marsala MD [facility]  Pager [phone]

## 2023-06-21 NOTE — PROCEDURES
Interventional Radiology Immediate Post-Procedure Note    Pre-Op Diagnosis: Nephrolithiasis  Post-Op Diagnosis: Same    Procedure: Nephrostogram and tube removal    Procedure performed by: Otoniel Hawk MD  Assistants: None    Estimated Blood Loss: Minimal  Specimen Removed: No: NA    Findings/description of procedure:  Indwelling RIGHT neph tube in good position. No hydro. Contrast flows appropriately down the right ureter and into the urinary bladder. The tube was removed. A sterile dressing was placed.    No immediate complications. Patient tolerated procedure well. Please see full dictated procedure report for additional details and recommendations.    Recs:  Wound care and follow-up per Urology.      Otoniel Hawk MD  [facility]  Pager [phone]

## 2023-06-21 NOTE — SUBJECTIVE & OBJECTIVE
Interval History: NAEO. AFVSS. Cr stable. PCNT light pink. Hgb 8.8 from 11.9 post op.      Objective:     Temp:  [96.9 °F (36.1 °C)-98.9 °F (37.2 °C)] 98.1 °F (36.7 °C)  Pulse:  [] 84  Resp:  [16-18] 18  SpO2:  [93 %-96 %] 95 %  BP: (103-142)/(52-65) 142/64     There is no height or weight on file to calculate BMI.           Drains       Drain  Duration                  Nephrostomy 06/20/23 1002 Right 12 Fr. <1 day         Urethral Catheter 06/20/23 0745 Straight-tip;Non-latex 16 Fr. <1 day                     Physical Exam      Significant Labs:    BMP:  Recent Labs   Lab 06/20/23  0549 06/21/23  0424    142   K 4.0 3.7    112*   CO2 21* 23   BUN 14 9   CREATININE 0.8 0.8   CALCIUM 8.5* 7.4*       CBC:   Recent Labs   Lab 06/20/23  0549 06/20/23  1159 06/21/23  0424   WBC 6.42 7.40 7.10   HGB 13.4 11.9* 8.8*   HCT 40.2 36.6* 27.5*    243 195       Blood Culture: No results for input(s): LABBLOO in the last 168 hours.  Urine Culture: No results for input(s): LABURIN in the last 168 hours.  Urine Studies: No results for input(s): COLORU, APPEARANCEUA, PHUR, SPECGRAV, PROTEINUA, GLUCUA, KETONESU, BILIRUBINUA, OCCULTUA, NITRITE, UROBILINOGEN, LEUKOCYTESUR, RBCUA, WBCUA, BACTERIA, SQUAMEPITHEL, HYALINECASTS in the last 168 hours.    Invalid input(s): WRIGHTSUR    Significant Imaging:  CT RSS not performed yet.

## 2023-06-21 NOTE — ASSESSMENT & PLAN NOTE
66 yo female with above comorbidities.    - S/p R PCNL 6/20/23  - Hgb 8.8 from 11.9  - Pain well controlled  - Tolerating po diet  - AFVSS  - Follow up CT this am    Dispo depending on CT results

## 2023-06-21 NOTE — DISCHARGE INSTRUCTIONS
Do not shower until 48 hours after surgery. Do not scrub incision in the shower, but rather, let soapy water run over incisions. Do not submerge incision in water (no baths, hot tubs, or swimming pools). It is expected to have leakage of fluid from your former nephrostomy tube site until the incision heals. Keep the site of your former nephrostomy tube covered with gauze and Tegaderm (plastic dressing).     You may see some blood in your urine while the stone fragments are passing and a few days afterward. Do not be alarmed, even if the urine was clear for a while. Push fluids and refrain from strenuous activity until clearing occurs. If you have difficulty passing clots or don't improve, call us. Avoid medications such as Aspirin, Advil, Motrin, Plavix, or Coumadin, which thin the blood and cause bleeding.    Diet:  You may return to your normal diet immediately. Alcohol, spicy foods, acidy foods and drinks with caffeine may cause irritation or frequency and should be used in moderation. To keep your urine flowing freely and to avoid constipation, drink plenty of fluids during the day (8-10 glasses).  Activity:  While the kidney is healing do not engage in strenuous activity. If you are active, you may see more blood in the urine. We would suggest cutting down your activity under these circumstances until the bleeding has stopped, perhaps two weeks.  Bowels:  It is important to keep your bowels regular during the postoperative period. Straining with bowel movements can cause bleeding. A bowel movement every other day is reasonable. Use a mild over-the-counter laxative if needed, such as Milk of Magnesia 2-3 tablespoons, or 1-2 Dulcolax tablets. Call if you continue to have problems. Narcotics can worsen constipation; if you had been taking narcotics for pain, before, during or after your surgery, you may be constipated. Ditropan for bladder spasms may also cause constipation.  Problems you should report to  us:  Fevers over 101.5 degrees Fahrenheit.   Inability to urinate.   Drug reactions (hives, rash, nausea, vomiting, diarrhea)   Severe burning or pain with urination that is not improving.   You will also have some burning with urination. This is normal after stone therapy. This burning should be mild or moderate and should improve over time. Severe burning, especially when it is not improving, can be a sign of infection.  Follow-up  After the stone has been fragmented you will likely need an x-ray and kidney ultrasound prior to next clinic appointment    Call Urology at [phone] with any problems

## 2023-06-21 NOTE — DISCHARGE SUMMARY
[facility] ([address])  Urology  Discharge Summary      Patient Name: Eulalia Bermudez  MRN: [id number]  Admission Date: 6/19/2023  Hospital Length of Stay: 1 days  Discharge Date and Time:  06/21/2023 12:48 PM  Attending Physician: Bert Anaya MD   Discharging Provider: Sampson Ricks MD  Primary Care Physician: Lizy Noble NP    HPI:   Eulalia Bermudez is a 67 y.o. female who is being admitted s/p right cyst drainage and PCNL placement by IR 6/19/23. She is scheduled to undergo PCNL 6/20/23.     Previously seen by Dr. Preciado and referred for possible PCNL. She recently presented with  and had CT Urogram for workup that indicated large right renal stones as well as a large rigo-pelvic cyst. Here today to discuss treatment option. No pain. No prior h/o nephrolithiasis.     Hx of HTN, HAYDER/MDD, hypothyroidism, migraines     The following portions of the patient's history were reviewed and updated as appropriate: allergies, current medications, past family history, past medical history, past social history, past surgical history and problem list.      Procedure(s) (LRB):  Nephrostogram (Left)     Indwelling Lines/Drains at time of discharge:   Lines/Drains/Airways     Drain  Duration                Nephrostomy 06/20/23 1002 Right 12 Fr. 1 day                Hospital Course (synopsis of major diagnoses, care, treatment, and services provided during the course of the hospital stay): Patient admitted 6/20/23 for right PCNL. Underwent left PCNT placement by IR 6/19/23. CT RSS 6/21 Am revealed one small stone. Antegrade nephrostogram by IR 6/21 with appropriate contrast down to bladder, neph tube removed. Perales catheter removed and patient passed voiding trial POD1. Pain well controlled. Tolerating po diet. Ambulating halls. Deemed suitable for discharge on POD1 6/21/23.    Goals of Care Treatment Preferences:  Code Status: Full Code      Consults:   Consults (From admission, onward)        Status  Ordering Provider     Inpatient consult to Interventional Radiology  Once        Provider:  (Not yet assigned)    Completed BERT SARKAR          Significant Diagnostic Studies: Antegrade nephrostogram    Pending Diagnostic Studies:     Procedure Component Value Units Date/Time    Urinary Stone Analysis [[id number]] Collected: 06/20/23 1010    Order Status: Sent Lab Status: In process Updated: 06/20/23 1010    Specimen: Stone           Final Active Diagnoses:    Diagnosis Date Noted POA    PRINCIPAL PROBLEM:  Nephrolithiasis [N20.0] 06/19/2023 Yes      Problems Resolved During this Admission:         Discharged Condition: good    Disposition: Home or Self Care     Will follow up in [place] in approximately 1 month with Dr. Sarkar.    Follow Up:   Follow-up Information     Bert Sarkar MD Follow up in 4 week(s).    Specialty: Urology  Why: Post-Op Follow-Up  Contact information:  [address] [facility]  [address] [place], [place] [address] [phone]                       Patient Instructions:      Diet general     Call MD for:  redness, tenderness, or signs of infection (pain, swelling, redness, odor or green/yellow discharge around incision site)     Leave dressing on - Keep it clean, dry, and intact until clinic visit     Notify your health care provider if you experience any of the following:  temperature >100.4     Notify your health care provider if you experience any of the following:  persistent nausea and vomiting or diarrhea     Notify your health care provider if you experience any of the following:  severe uncontrolled pain     Notify your health care provider if you experience any of the following:  redness, tenderness, or signs of infection (pain, swelling, redness, odor or green/yellow discharge around incision site)     Notify your health care provider if you experience any of the following:  worsening rash     Notify your health care provider if you experience any of the following:   persistent dizziness, light-headedness, or visual disturbances     Medications:  Reconciled Home Medications:      Medication List      CONTINUE taking these medications    acetaminophen 500 MG tablet  Commonly known as: TYLENOL  Take 500 mg by mouth every 6 (six) hours as needed for Pain.     amLODIPine 5 MG tablet  Commonly known as: NORVASC  Take 1 tablet (5 mg total) by mouth once daily.     B COMPLEX-VITAMIN B12 tablet  Generic drug: b complex vitamins  1 tablet  once a day     buPROPion 300 MG 24 hr tablet  Commonly known as: WELLBUTRIN XL  Take 1 tablet (300 mg total) by mouth once daily.     cetirizine 10 MG tablet  Commonly known as: ZYRTEC  Take 10 mg by mouth once daily.     codeine-butalbital-ASA-caffeine 60--40 mg Cap  Commonly known as: BUTALBITAL COMPOUND W/CODEINE  Take 1 capsule by mouth every 4 (four) hours as needed.     diclofenac sodium 1 % Gel  Commonly known as: VOLTAREN  Apply topically 2 (two) times daily as needed.     EScitalopram oxalate 20 MG tablet  Commonly known as: LEXAPRO  Take 1 tablet (20 mg total) by mouth once daily.     folic acid 400 MCG tablet  Commonly known as: FOLVITE  Take 400 mcg by mouth once daily.     gabapentin 300 MG capsule  Commonly known as: NEURONTIN  Take 1 capsule (300 mg total) by mouth 2 (two) times daily.     ketoconazole 2 % cream  Commonly known as: NIZORAL  Apply topically once daily.     metoprolol tartrate 50 MG tablet  Commonly known as: LOPRESSOR  Take 1 tablet (50 mg total) by mouth once daily.     mupirocin 2 % ointment  Commonly known as: BACTROBAN  Apply topically 3 (three) times daily.     triamcinolone 55 mcg nasal inhaler  Commonly known as: NASACORT  2 sprays by Nasal route once daily.     * UNITHROID 112 MCG tablet  Generic drug: levothyroxine  Take 112 mcg by mouth once daily.     * UNITHROID 100 MCG tablet  Generic drug: levothyroxine  Take 100 mcg by mouth once daily.     vitamin D 1000 units Tab  Commonly known as: VITAMIN D3  Take  1,000 Units by mouth once daily.         * This list has 2 medication(s) that are the same as other medications prescribed for you. Read the directions carefully, and ask your doctor or other care provider to review them with you.                Time spent on the discharge of patient: 10 minutes    Sampson Ricks MD  Urology  [facility] ([address])

## 2023-06-21 NOTE — ASSESSMENT & PLAN NOTE
68 yo female with above comorbidities.    - S/p R PCNL 6/20/23  - Hgb 8.8 from 11.9  - Pain well controlled  - Tolerating po diet  - AFVSS  - Follow up CT this am    Dispo depending on CT results

## 2023-06-21 NOTE — CARE UPDATE
06/21/23 0853   Patient Assessment/Suction   Level of Consciousness (AVPU) alert   PRE-TX-O2   Device (Oxygen Therapy) room air   SpO2 96 %   Pulse Oximetry Type Intermittent   $ Pulse Oximetry - Multiple Charge Pulse Oximetry - Multiple   Incentive Spirometer   $ Incentive Spirometer Charges done with encouragement   Incentive Spirometer Predicted Level (mL) 1200   Administration (IS) instruction provided, follow-up   Number of Repetitions (IS) 10   Level Incentive Spirometer (mL) 1000   Patient Tolerance (IS) good

## 2023-06-22 LAB
COMPN STONE: NORMAL
LABORATORY COMMENT REPORT: NORMAL
SPECIMEN SOURCE: NORMAL
STONE ANALYSIS IR-IMP: NORMAL

## 2023-06-23 ENCOUNTER — PATIENT MESSAGE (OUTPATIENT)
Dept: UROLOGY | Facility: CLINIC | Age: 68
End: 2023-06-23
Payer: MEDICARE

## 2023-07-06 ENCOUNTER — TELEPHONE (OUTPATIENT)
Dept: FAMILY MEDICINE | Facility: CLINIC | Age: 68
End: 2023-07-06

## 2023-07-06 RX ORDER — BUTALBITAL, ASPIRIN, CAFFEINE AND CODEINE PHOSPHATE 50; 325; 40; 30 MG/1; MG/1; MG/1; MG/1
1 CAPSULE ORAL EVERY 4 HOURS PRN
Qty: 30 CAPSULE | Refills: 0 | Status: SHIPPED | OUTPATIENT
Start: 2023-07-06 | End: 2023-08-02 | Stop reason: SDUPTHER

## 2023-07-06 NOTE — TELEPHONE ENCOUNTER
----- Message from Monik Jaramillo sent at 7/6/2023 11:19 AM CDT -----  - pt is calling for refill on [facility]   [phone]

## 2023-08-02 ENCOUNTER — TELEPHONE (OUTPATIENT)
Dept: FAMILY MEDICINE | Facility: CLINIC | Age: 68
End: 2023-08-02

## 2023-08-02 RX ORDER — BUTALBITAL, ASPIRIN, CAFFEINE AND CODEINE PHOSPHATE 50; 325; 40; 30 MG/1; MG/1; MG/1; MG/1
1 CAPSULE ORAL EVERY 4 HOURS PRN
Qty: 30 CAPSULE | Refills: 0 | Status: SHIPPED | OUTPATIENT
Start: 2023-08-05 | End: 2023-09-01 | Stop reason: SDUPTHER

## 2023-08-02 NOTE — TELEPHONE ENCOUNTER
----- Message from Monik Jaramillo sent at 8/2/2023  9:55 AM CDT -----  - 9:42-pt is calling for refill on [facility]   [phone]

## 2023-08-04 ENCOUNTER — OFFICE VISIT (OUTPATIENT)
Dept: UROLOGY | Facility: CLINIC | Age: 68
End: 2023-08-04
Payer: MEDICARE

## 2023-08-04 ENCOUNTER — TELEPHONE (OUTPATIENT)
Dept: UROLOGY | Facility: CLINIC | Age: 68
End: 2023-08-04
Payer: MEDICARE

## 2023-08-04 VITALS
DIASTOLIC BLOOD PRESSURE: 73 MMHG | HEIGHT: 65 IN | SYSTOLIC BLOOD PRESSURE: 126 MMHG | BODY MASS INDEX: 25.54 KG/M2 | HEART RATE: 67 BPM | WEIGHT: 153.31 LBS

## 2023-08-04 DIAGNOSIS — N20.0 NEPHROLITHIASIS: Primary | ICD-10-CM

## 2023-08-04 PROCEDURE — 1126F PR PAIN SEVERITY QUANTIFIED, NO PAIN PRESENT: ICD-10-PCS | Mod: CPTII,S$GLB,, | Performed by: UROLOGY

## 2023-08-04 PROCEDURE — 3074F SYST BP LT 130 MM HG: CPT | Mod: CPTII,S$GLB,, | Performed by: UROLOGY

## 2023-08-04 PROCEDURE — 1101F PR PT FALLS ASSESS DOC 0-1 FALLS W/OUT INJ PAST YR: ICD-10-PCS | Mod: CPTII,S$GLB,, | Performed by: UROLOGY

## 2023-08-04 PROCEDURE — 3074F PR MOST RECENT SYSTOLIC BLOOD PRESSURE < 130 MM HG: ICD-10-PCS | Mod: CPTII,S$GLB,, | Performed by: UROLOGY

## 2023-08-04 PROCEDURE — 1159F MED LIST DOCD IN RCRD: CPT | Mod: CPTII,S$GLB,, | Performed by: UROLOGY

## 2023-08-04 PROCEDURE — 99024 PR POST-OP FOLLOW-UP VISIT: ICD-10-PCS | Mod: S$GLB,,, | Performed by: UROLOGY

## 2023-08-04 PROCEDURE — 1160F PR REVIEW ALL MEDS BY PRESCRIBER/CLIN PHARMACIST DOCUMENTED: ICD-10-PCS | Mod: CPTII,S$GLB,, | Performed by: UROLOGY

## 2023-08-04 PROCEDURE — 3288F FALL RISK ASSESSMENT DOCD: CPT | Mod: CPTII,S$GLB,, | Performed by: UROLOGY

## 2023-08-04 PROCEDURE — 1160F RVW MEDS BY RX/DR IN RCRD: CPT | Mod: CPTII,S$GLB,, | Performed by: UROLOGY

## 2023-08-04 PROCEDURE — 1101F PT FALLS ASSESS-DOCD LE1/YR: CPT | Mod: CPTII,S$GLB,, | Performed by: UROLOGY

## 2023-08-04 PROCEDURE — 3078F DIAST BP <80 MM HG: CPT | Mod: CPTII,S$GLB,, | Performed by: UROLOGY

## 2023-08-04 PROCEDURE — 99024 POSTOP FOLLOW-UP VISIT: CPT | Mod: S$GLB,,, | Performed by: UROLOGY

## 2023-08-04 PROCEDURE — 3288F PR FALLS RISK ASSESSMENT DOCUMENTED: ICD-10-PCS | Mod: CPTII,S$GLB,, | Performed by: UROLOGY

## 2023-08-04 PROCEDURE — 3008F BODY MASS INDEX DOCD: CPT | Mod: CPTII,S$GLB,, | Performed by: UROLOGY

## 2023-08-04 PROCEDURE — 1159F PR MEDICATION LIST DOCUMENTED IN MEDICAL RECORD: ICD-10-PCS | Mod: CPTII,S$GLB,, | Performed by: UROLOGY

## 2023-08-04 PROCEDURE — 99999 PR PBB SHADOW E&M-EST. PATIENT-LVL IV: ICD-10-PCS | Mod: PBBFAC,,, | Performed by: UROLOGY

## 2023-08-04 PROCEDURE — 3008F PR BODY MASS INDEX (BMI) DOCUMENTED: ICD-10-PCS | Mod: CPTII,S$GLB,, | Performed by: UROLOGY

## 2023-08-04 PROCEDURE — 1126F AMNT PAIN NOTED NONE PRSNT: CPT | Mod: CPTII,S$GLB,, | Performed by: UROLOGY

## 2023-08-04 PROCEDURE — 99999 PR PBB SHADOW E&M-EST. PATIENT-LVL IV: CPT | Mod: PBBFAC,,, | Performed by: UROLOGY

## 2023-08-04 PROCEDURE — 3078F PR MOST RECENT DIASTOLIC BLOOD PRESSURE < 80 MM HG: ICD-10-PCS | Mod: CPTII,S$GLB,, | Performed by: UROLOGY

## 2023-08-04 NOTE — TELEPHONE ENCOUNTER
----- Message from Bert Anaya MD sent at 8/4/2023  4:02 PM CDT -----  Please send a litholink order for this patient. Thanks.

## 2023-08-04 NOTE — PROGRESS NOTES
"Subjective:      Eulalia Bermudez is a 68 y.o. female who returns today regarding her nephrolithiasis.    She is s/p right PCNL at [facility] on 6/20/23. Also had peripelvic cyst drained by IR at time of PCN placement. Procedure was uncomplicated and she has no c/o now.    She does have prior h/o nephrolithiasis but has never had stone analysis or metabolic workup.    The following portions of the patient's history were reviewed and updated as appropriate: allergies, current medications, past family history, past medical history, past social history, past surgical history and problem list.    Review of Systems  A comprehensive multipoint review of systems was negative except as otherwise stated in the HPI.     Objective:   Vitals: /73   Pulse 67   Ht 5' 5" (1.651 m)   Wt 69.6 kg (153 lb 5.3 oz)   BMI 25.52 kg/m²     Physical Exam   General: alert and oriented, no acute distress  Respiratory: Symmetric expansion, non-labored breathing  Neuro: no gross deficits  Psych: normal judgment and insight, normal mood/affect, and non-anxious    Lab Review   Urinalysis demonstrates positive for leukocytes, red blood cells  Lab Results   Component Value Date    WBC 7.10 06/21/2023    HGB 8.8 (L) 06/21/2023    HCT 27.5 (L) 06/21/2023     (H) 06/21/2023     06/21/2023     Lab Results   Component Value Date    CREATININE 0.8 06/21/2023    CREATININE 1.04 11/13/2018    BUN 9 06/21/2023     Stone Analysis: 80% uric acid, 20% CaOx    Assessment and Plan:   1. Nephrolithiasis  -- Recommend metabolic workup to assess urine pH given UA stones  -- Will likely benefit from urine alkalinization pending results of above  -- Plan for RBUS in 6 mos to monitor for stone and cyst recurrence  -- FU 6 weeks to review metabolic workup  "

## 2023-09-01 RX ORDER — BUTALBITAL, ASPIRIN, CAFFEINE AND CODEINE PHOSPHATE 50; 325; 40; 30 MG/1; MG/1; MG/1; MG/1
1 CAPSULE ORAL EVERY 4 HOURS PRN
Qty: 30 CAPSULE | Refills: 0 | Status: SHIPPED | OUTPATIENT
Start: 2023-09-01 | End: 2023-10-03 | Stop reason: SDUPTHER

## 2023-09-01 NOTE — TELEPHONE ENCOUNTER
----- Message from Monik Jaramillo sent at 9/1/2023 11:52 AM CDT -----  - 11:30-pt is calling for refill on [facility]   [phone]

## 2023-09-06 ENCOUNTER — PATIENT MESSAGE (OUTPATIENT)
Dept: UROLOGY | Facility: CLINIC | Age: 68
End: 2023-09-06
Payer: MEDICARE

## 2023-09-12 ENCOUNTER — OFFICE VISIT (OUTPATIENT)
Dept: FAMILY MEDICINE | Facility: CLINIC | Age: 68
End: 2023-09-12
Payer: MEDICARE

## 2023-09-12 VITALS
BODY MASS INDEX: 24.99 KG/M2 | OXYGEN SATURATION: 99 % | HEART RATE: 70 BPM | HEIGHT: 65 IN | WEIGHT: 150 LBS | DIASTOLIC BLOOD PRESSURE: 60 MMHG | SYSTOLIC BLOOD PRESSURE: 112 MMHG

## 2023-09-12 DIAGNOSIS — E03.4 HYPOTHYROIDISM DUE TO ACQUIRED ATROPHY OF THYROID: ICD-10-CM

## 2023-09-12 DIAGNOSIS — E55.9 VITAMIN D DEFICIENCY: ICD-10-CM

## 2023-09-12 DIAGNOSIS — F41.9 ANXIETY: ICD-10-CM

## 2023-09-12 DIAGNOSIS — G44.201 INTRACTABLE TENSION-TYPE HEADACHE, UNSPECIFIED CHRONICITY PATTERN: ICD-10-CM

## 2023-09-12 DIAGNOSIS — D64.9 ANEMIA, UNSPECIFIED TYPE: ICD-10-CM

## 2023-09-12 DIAGNOSIS — I10 HYPERTENSION, ESSENTIAL: ICD-10-CM

## 2023-09-12 DIAGNOSIS — E53.8 B12 DEFICIENCY: ICD-10-CM

## 2023-09-12 DIAGNOSIS — G62.9 NEUROPATHY: Primary | ICD-10-CM

## 2023-09-12 DIAGNOSIS — Z79.899 HIGH RISK MEDICATION USE: ICD-10-CM

## 2023-09-12 PROCEDURE — 1160F PR REVIEW ALL MEDS BY PRESCRIBER/CLIN PHARMACIST DOCUMENTED: ICD-10-PCS | Mod: CPTII,S$GLB,, | Performed by: NURSE PRACTITIONER

## 2023-09-12 PROCEDURE — 3078F PR MOST RECENT DIASTOLIC BLOOD PRESSURE < 80 MM HG: ICD-10-PCS | Mod: CPTII,S$GLB,, | Performed by: NURSE PRACTITIONER

## 2023-09-12 PROCEDURE — 99214 OFFICE O/P EST MOD 30 MIN: CPT | Mod: S$GLB,,, | Performed by: NURSE PRACTITIONER

## 2023-09-12 PROCEDURE — 99214 PR OFFICE/OUTPT VISIT, EST, LEVL IV, 30-39 MIN: ICD-10-PCS | Mod: S$GLB,,, | Performed by: NURSE PRACTITIONER

## 2023-09-12 PROCEDURE — 3074F PR MOST RECENT SYSTOLIC BLOOD PRESSURE < 130 MM HG: ICD-10-PCS | Mod: CPTII,S$GLB,, | Performed by: NURSE PRACTITIONER

## 2023-09-12 PROCEDURE — 1101F PR PT FALLS ASSESS DOC 0-1 FALLS W/OUT INJ PAST YR: ICD-10-PCS | Mod: CPTII,S$GLB,, | Performed by: NURSE PRACTITIONER

## 2023-09-12 PROCEDURE — 3008F PR BODY MASS INDEX (BMI) DOCUMENTED: ICD-10-PCS | Mod: CPTII,S$GLB,, | Performed by: NURSE PRACTITIONER

## 2023-09-12 PROCEDURE — 3288F PR FALLS RISK ASSESSMENT DOCUMENTED: ICD-10-PCS | Mod: CPTII,S$GLB,, | Performed by: NURSE PRACTITIONER

## 2023-09-12 PROCEDURE — 3078F DIAST BP <80 MM HG: CPT | Mod: CPTII,S$GLB,, | Performed by: NURSE PRACTITIONER

## 2023-09-12 PROCEDURE — 1101F PT FALLS ASSESS-DOCD LE1/YR: CPT | Mod: CPTII,S$GLB,, | Performed by: NURSE PRACTITIONER

## 2023-09-12 PROCEDURE — 3008F BODY MASS INDEX DOCD: CPT | Mod: CPTII,S$GLB,, | Performed by: NURSE PRACTITIONER

## 2023-09-12 PROCEDURE — 3044F HG A1C LEVEL LT 7.0%: CPT | Mod: CPTII,S$GLB,, | Performed by: NURSE PRACTITIONER

## 2023-09-12 PROCEDURE — 1159F MED LIST DOCD IN RCRD: CPT | Mod: CPTII,S$GLB,, | Performed by: NURSE PRACTITIONER

## 2023-09-12 PROCEDURE — 1159F PR MEDICATION LIST DOCUMENTED IN MEDICAL RECORD: ICD-10-PCS | Mod: CPTII,S$GLB,, | Performed by: NURSE PRACTITIONER

## 2023-09-12 PROCEDURE — 3074F SYST BP LT 130 MM HG: CPT | Mod: CPTII,S$GLB,, | Performed by: NURSE PRACTITIONER

## 2023-09-12 PROCEDURE — 1160F RVW MEDS BY RX/DR IN RCRD: CPT | Mod: CPTII,S$GLB,, | Performed by: NURSE PRACTITIONER

## 2023-09-12 PROCEDURE — 3288F FALL RISK ASSESSMENT DOCD: CPT | Mod: CPTII,S$GLB,, | Performed by: NURSE PRACTITIONER

## 2023-09-12 PROCEDURE — 3044F PR MOST RECENT HEMOGLOBIN A1C LEVEL <7.0%: ICD-10-PCS | Mod: CPTII,S$GLB,, | Performed by: NURSE PRACTITIONER

## 2023-09-13 ENCOUNTER — TELEPHONE (OUTPATIENT)
Dept: FAMILY MEDICINE | Facility: CLINIC | Age: 68
End: 2023-09-13

## 2023-09-13 LAB
ALBUMIN SERPL-MCNC: 4 G/DL (ref 3.6–5.1)
ALBUMIN/GLOB SERPL: 1.7 (CALC) (ref 1–2.5)
ALP SERPL-CCNC: 106 U/L (ref 37–153)
ALT SERPL-CCNC: 37 U/L (ref 6–29)
AST SERPL-CCNC: 36 U/L (ref 10–35)
BASOPHILS # BLD AUTO: 41 CELLS/UL (ref 0–200)
BASOPHILS NFR BLD AUTO: 0.9 %
BILIRUB SERPL-MCNC: 0.2 MG/DL (ref 0.2–1.2)
BUN SERPL-MCNC: 23 MG/DL (ref 7–25)
BUN/CREAT SERPL: 21 (CALC) (ref 6–22)
CALCIUM SERPL-MCNC: 9.1 MG/DL (ref 8.6–10.4)
CHLORIDE SERPL-SCNC: 107 MMOL/L (ref 98–110)
CO2 SERPL-SCNC: 29 MMOL/L (ref 20–32)
CREAT SERPL-MCNC: 1.08 MG/DL (ref 0.5–1.05)
EGFR: 56 ML/MIN/1.73M2
EOSINOPHIL # BLD AUTO: 161 CELLS/UL (ref 15–500)
EOSINOPHIL NFR BLD AUTO: 3.5 %
ERYTHROCYTE [DISTWIDTH] IN BLOOD BY AUTOMATED COUNT: 13.3 % (ref 11–15)
FERRITIN SERPL-MCNC: 5 NG/ML (ref 16–288)
GLOBULIN SER CALC-MCNC: 2.4 G/DL (CALC) (ref 1.9–3.7)
GLUCOSE SERPL-MCNC: 88 MG/DL (ref 65–99)
HBA1C MFR BLD: 5.2 % OF TOTAL HGB
HCT VFR BLD AUTO: 36.9 % (ref 35–45)
HGB BLD-MCNC: 11.4 G/DL (ref 11.7–15.5)
LYMPHOCYTES # BLD AUTO: 1826 CELLS/UL (ref 850–3900)
LYMPHOCYTES NFR BLD AUTO: 39.7 %
MCH RBC QN AUTO: 28.3 PG (ref 27–33)
MCHC RBC AUTO-ENTMCNC: 30.9 G/DL (ref 32–36)
MCV RBC AUTO: 91.6 FL (ref 80–100)
MONOCYTES # BLD AUTO: 409 CELLS/UL (ref 200–950)
MONOCYTES NFR BLD AUTO: 8.9 %
NEUTROPHILS # BLD AUTO: 2162 CELLS/UL (ref 1500–7800)
NEUTROPHILS NFR BLD AUTO: 47 %
PLATELET # BLD AUTO: 241 THOUSAND/UL (ref 140–400)
PMV BLD REES-ECKER: 10.5 FL (ref 7.5–12.5)
POTASSIUM SERPL-SCNC: 4.3 MMOL/L (ref 3.5–5.3)
PROT SERPL-MCNC: 6.4 G/DL (ref 6.1–8.1)
RBC # BLD AUTO: 4.03 MILLION/UL (ref 3.8–5.1)
SODIUM SERPL-SCNC: 144 MMOL/L (ref 135–146)
TSH SERPL-ACNC: 4.16 MIU/L (ref 0.4–4.5)
VIT B12 SERPL-MCNC: 305 PG/ML (ref 200–1100)
WBC # BLD AUTO: 4.6 THOUSAND/UL (ref 3.8–10.8)

## 2023-09-13 RX ORDER — ASCORBIC ACID 500 MG
500 TABLET ORAL 2 TIMES DAILY
COMMUNITY

## 2023-09-18 ENCOUNTER — PATIENT MESSAGE (OUTPATIENT)
Dept: FAMILY MEDICINE | Facility: CLINIC | Age: 68
End: 2023-09-18

## 2023-09-18 DIAGNOSIS — Z79.899 HIGH RISK MEDICATION USE: ICD-10-CM

## 2023-09-18 DIAGNOSIS — Z00.00 PHYSICAL EXAM: ICD-10-CM

## 2023-09-18 DIAGNOSIS — I10 HYPERTENSION, ESSENTIAL: ICD-10-CM

## 2023-09-18 DIAGNOSIS — F32.1 MODERATE MAJOR DEPRESSION: ICD-10-CM

## 2023-09-18 RX ORDER — ESCITALOPRAM OXALATE 20 MG/1
20 TABLET ORAL DAILY
Qty: 90 TABLET | Refills: 1 | Status: SHIPPED | OUTPATIENT
Start: 2023-09-18 | End: 2024-03-12 | Stop reason: SDUPTHER

## 2023-09-18 RX ORDER — BUPROPION HYDROCHLORIDE 300 MG/1
300 TABLET ORAL DAILY
Qty: 90 TABLET | Refills: 1 | Status: SHIPPED | OUTPATIENT
Start: 2023-09-18 | End: 2024-03-12 | Stop reason: SDUPTHER

## 2023-09-18 RX ORDER — METOPROLOL TARTRATE 50 MG/1
50 TABLET ORAL DAILY
Qty: 90 TABLET | Refills: 1 | Status: SHIPPED | OUTPATIENT
Start: 2023-09-18 | End: 2024-03-12 | Stop reason: SDUPTHER

## 2023-09-18 RX ORDER — AMLODIPINE BESYLATE 5 MG/1
5 TABLET ORAL DAILY
Qty: 90 TABLET | Refills: 1 | Status: SHIPPED | OUTPATIENT
Start: 2023-09-18 | End: 2024-03-12 | Stop reason: SDUPTHER

## 2023-09-25 NOTE — PROGRESS NOTES
SUBJECTIVE:    Patient ID: Eulalia Bermudez is a 68 y.o. female.    Chief Complaint: Follow-up (No bottles, Follow up, Complaints of Fatigue //BA )    68 year old female presents for check up.treated for migraines, depression, anxiety, hypothyroid. taking both wellbutrin and lexapro.feels like stress/anxiety is well controlled.  Followed by dr. Spencer . Bp in office is well controlled. Stress is manageable. Sleeping ok.  Due for labs. Last mammogram 4/23. Last dexa 4/22 . Last colonoscopy 2017 . 10 year follow up.       Office Visit on 09/12/2023   Component Date Value Ref Range Status    WBC 09/12/2023 4.6  3.8 - 10.8 Thousand/uL Final    RBC 09/12/2023 4.03  3.80 - 5.10 Million/uL Final    Hemoglobin 09/12/2023 11.4 (L)  11.7 - 15.5 g/dL Final    Hematocrit 09/12/2023 36.9  35.0 - 45.0 % Final    MCV 09/12/2023 91.6  80.0 - 100.0 fL Final    MCH 09/12/2023 28.3  27.0 - 33.0 pg Final    MCHC 09/12/2023 30.9 (L)  32.0 - 36.0 g/dL Final    RDW 09/12/2023 13.3  11.0 - 15.0 % Final    Platelets 09/12/2023 241  140 - 400 Thousand/uL Final    MPV 09/12/2023 10.5  7.5 - 12.5 fL Final    Neutrophils, Abs 09/12/2023 2,162  1,500 - 7,800 cells/uL Final    Lymph # 09/12/2023 1,826  850 - 3,900 cells/uL Final    Mono # 09/12/2023 409  200 - 950 cells/uL Final    Eos # 09/12/2023 161  15 - 500 cells/uL Final    Baso # 09/12/2023 41  0 - 200 cells/uL Final    Neutrophils Relative 09/12/2023 47  % Final    Lymph % 09/12/2023 39.7  % Final    Mono % 09/12/2023 8.9  % Final    Eosinophil % 09/12/2023 3.5  % Final    Basophil % 09/12/2023 0.9  % Final    Ferritin 09/12/2023 5 (L)  16 - 288 ng/mL Final    Glucose 09/12/2023 88  65 - 99 mg/dL Final    BUN 09/12/2023 23  7 - 25 mg/dL Final    Creatinine 09/12/2023 1.08 (H)  0.50 - 1.05 mg/dL Final    eGFR 09/12/2023 56 (L)  > OR = 60 mL/min/1.73m2 Final    BUN/Creatinine Ratio 09/12/2023 21  6 - 22 (calc) Final    Sodium 09/12/2023 144  135 - 146 mmol/L Final    Potassium 09/12/2023  4.3  3.5 - 5.3 mmol/L Final    Chloride 09/12/2023 107  98 - 110 mmol/L Final    CO2 09/12/2023 29  20 - 32 mmol/L Final    Calcium 09/12/2023 9.1  8.6 - 10.4 mg/dL Final    Total Protein 09/12/2023 6.4  6.1 - 8.1 g/dL Final    Albumin 09/12/2023 4.0  3.6 - 5.1 g/dL Final    Globulin, Total 09/12/2023 2.4  1.9 - 3.7 g/dL (calc) Final    Albumin/Globulin Ratio 09/12/2023 1.7  1.0 - 2.5 (calc) Final    Total Bilirubin 09/12/2023 0.2  0.2 - 1.2 mg/dL Final    Alkaline Phosphatase 09/12/2023 106  37 - 153 U/L Final    AST 09/12/2023 36 (H)  10 - 35 U/L Final    ALT 09/12/2023 37 (H)  6 - 29 U/L Final    TSH w/reflex to FT4 09/12/2023 4.16  0.40 - 4.50 mIU/L Final    Vitamin B-12 09/12/2023 305  200 - 1,100 pg/mL Final    Hemoglobin A1C 09/12/2023 5.2  <5.7 % of total Hgb Final   Admission on 06/19/2023, Discharged on 06/21/2023   Component Date Value Ref Range Status    Sodium 06/20/2023 139  136 - 145 mmol/L Final    Potassium 06/20/2023 4.0  3.5 - 5.1 mmol/L Final    Chloride 06/20/2023 107  95 - 110 mmol/L Final    CO2 06/20/2023 21 (L)  23 - 29 mmol/L Final    Glucose 06/20/2023 111 (H)  70 - 110 mg/dL Final    BUN 06/20/2023 14  8 - 23 mg/dL Final    Creatinine 06/20/2023 0.8  0.5 - 1.4 mg/dL Final    Calcium 06/20/2023 8.5 (L)  8.7 - 10.5 mg/dL Final    Anion Gap 06/20/2023 11  8 - 16 mmol/L Final    eGFR 06/20/2023 >60  >60 mL/min/1.73 m^2 Final    WBC 06/20/2023 6.42  3.90 - 12.70 K/uL Final    RBC 06/20/2023 3.95 (L)  4.00 - 5.40 M/uL Final    Hemoglobin 06/20/2023 13.4  12.0 - 16.0 g/dL Final    Hematocrit 06/20/2023 40.2  37.0 - 48.5 % Final    MCV 06/20/2023 102 (H)  82 - 98 fL Final    MCH 06/20/2023 33.9 (H)  27.0 - 31.0 pg Final    MCHC 06/20/2023 33.3  32.0 - 36.0 g/dL Final    RDW 06/20/2023 12.4  11.5 - 14.5 % Final    Platelets 06/20/2023 237  150 - 450 K/uL Final    MPV 06/20/2023 10.0  9.2 - 12.9 fL Final    Immature Granulocytes 06/20/2023 0.3  0.0 - 0.5 % Final    Gran # (ANC) 06/20/2023 4.5  1.8  - 7.7 K/uL Final    Immature Grans (Abs) 06/20/2023 0.02  0.00 - 0.04 K/uL Final    Lymph # 06/20/2023 1.3  1.0 - 4.8 K/uL Final    Mono # 06/20/2023 0.5  0.3 - 1.0 K/uL Final    Eos # 06/20/2023 0.1  0.0 - 0.5 K/uL Final    Baso # 06/20/2023 0.04  0.00 - 0.20 K/uL Final    nRBC 06/20/2023 0  0 /100 WBC Final    Gran % 06/20/2023 70.8  38.0 - 73.0 % Final    Lymph % 06/20/2023 19.9  18.0 - 48.0 % Final    Mono % 06/20/2023 7.6  4.0 - 15.0 % Final    Eosinophil % 06/20/2023 0.8  0.0 - 8.0 % Final    Basophil % 06/20/2023 0.6  0.0 - 1.9 % Final    Differential Method 06/20/2023 Automated   Final    Stone Source 06/20/2023 Kidney   Final    Stone Analysis 06/20/2023 Test Not Performed   Final    Stone Analysis Comment 06/20/2023 SEE BELOW   Final    Kidney Stone Result Comment 06/20/2023 SEE BELOW   Final    WBC 06/20/2023 7.40  3.90 - 12.70 K/uL Final    RBC 06/20/2023 3.48 (L)  4.00 - 5.40 M/uL Final    Hemoglobin 06/20/2023 11.9 (L)  12.0 - 16.0 g/dL Final    Hematocrit 06/20/2023 36.6 (L)  37.0 - 48.5 % Final    MCV 06/20/2023 105 (H)  82 - 98 fL Final    MCH 06/20/2023 34.2 (H)  27.0 - 31.0 pg Final    MCHC 06/20/2023 32.5  32.0 - 36.0 g/dL Final    RDW 06/20/2023 12.7  11.5 - 14.5 % Final    Platelets 06/20/2023 243  150 - 450 K/uL Final    MPV 06/20/2023 10.0  9.2 - 12.9 fL Final    Immature Granulocytes 06/20/2023 0.4  0.0 - 0.5 % Final    Gran # (ANC) 06/20/2023 6.3  1.8 - 7.7 K/uL Final    Immature Grans (Abs) 06/20/2023 0.03  0.00 - 0.04 K/uL Final    Lymph # 06/20/2023 0.9 (L)  1.0 - 4.8 K/uL Final    Mono # 06/20/2023 0.1 (L)  0.3 - 1.0 K/uL Final    Eos # 06/20/2023 0.0  0.0 - 0.5 K/uL Final    Baso # 06/20/2023 0.03  0.00 - 0.20 K/uL Final    nRBC 06/20/2023 0  0 /100 WBC Final    Gran % 06/20/2023 85.4 (H)  38.0 - 73.0 % Final    Lymph % 06/20/2023 12.2 (L)  18.0 - 48.0 % Final    Mono % 06/20/2023 1.6 (L)  4.0 - 15.0 % Final    Eosinophil % 06/20/2023 0.0  0.0 - 8.0 % Final    Basophil % 06/20/2023 0.4   0.0 - 1.9 % Final    Differential Method 06/20/2023 Automated   Final    Sodium 06/21/2023 142  136 - 145 mmol/L Final    Potassium 06/21/2023 3.7  3.5 - 5.1 mmol/L Final    Chloride 06/21/2023 112 (H)  95 - 110 mmol/L Final    CO2 06/21/2023 23  23 - 29 mmol/L Final    Glucose 06/21/2023 111 (H)  70 - 110 mg/dL Final    BUN 06/21/2023 9  8 - 23 mg/dL Final    Creatinine 06/21/2023 0.8  0.5 - 1.4 mg/dL Final    Calcium 06/21/2023 7.4 (L)  8.7 - 10.5 mg/dL Final    Anion Gap 06/21/2023 7 (L)  8 - 16 mmol/L Final    eGFR 06/21/2023 >60  >60 mL/min/1.73 m^2 Final    WBC 06/21/2023 7.10  3.90 - 12.70 K/uL Final    RBC 06/21/2023 2.59 (L)  4.00 - 5.40 M/uL Final    Hemoglobin 06/21/2023 8.8 (L)  12.0 - 16.0 g/dL Final    Hematocrit 06/21/2023 27.5 (L)  37.0 - 48.5 % Final    MCV 06/21/2023 106 (H)  82 - 98 fL Final    MCH 06/21/2023 34.0 (H)  27.0 - 31.0 pg Final    MCHC 06/21/2023 32.0  32.0 - 36.0 g/dL Final    RDW 06/21/2023 12.9  11.5 - 14.5 % Final    Platelets 06/21/2023 195  150 - 450 K/uL Final    MPV 06/21/2023 10.1  9.2 - 12.9 fL Final    Immature Granulocytes 06/21/2023 0.7 (H)  0.0 - 0.5 % Final    Gran # (ANC) 06/21/2023 4.4  1.8 - 7.7 K/uL Final    Immature Grans (Abs) 06/21/2023 0.05 (H)  0.00 - 0.04 K/uL Final    Lymph # 06/21/2023 2.0  1.0 - 4.8 K/uL Final    Mono # 06/21/2023 0.6  0.3 - 1.0 K/uL Final    Eos # 06/21/2023 0.1  0.0 - 0.5 K/uL Final    Baso # 06/21/2023 0.03  0.00 - 0.20 K/uL Final    nRBC 06/21/2023 0  0 /100 WBC Final    Gran % 06/21/2023 61.5  38.0 - 73.0 % Final    Lymph % 06/21/2023 27.9  18.0 - 48.0 % Final    Mono % 06/21/2023 8.7  4.0 - 15.0 % Final    Eosinophil % 06/21/2023 0.8  0.0 - 8.0 % Final    Basophil % 06/21/2023 0.4  0.0 - 1.9 % Final    Differential Method 06/21/2023 Automated   Final   Hospital Outpatient Visit on 06/13/2023   Component Date Value Ref Range Status    WBC 06/13/2023 5.52  3.90 - 12.70 K/uL Final    RBC 06/13/2023 4.03  4.00 - 5.40 M/uL Final     Hemoglobin 06/13/2023 13.5  12.0 - 16.0 g/dL Final    Hematocrit 06/13/2023 41.5  37.0 - 48.5 % Final    MCV 06/13/2023 103 (H)  82 - 98 fL Final    MCH 06/13/2023 33.5 (H)  27.0 - 31.0 pg Final    MCHC 06/13/2023 32.5  32.0 - 36.0 g/dL Final    RDW 06/13/2023 12.5  11.5 - 14.5 % Final    Platelets 06/13/2023 189  150 - 450 K/uL Final    MPV 06/13/2023 9.5  9.2 - 12.9 fL Final    Immature Granulocytes 06/13/2023 0.2  0.0 - 0.5 % Final    Gran # (ANC) 06/13/2023 2.8  1.8 - 7.7 K/uL Final    Immature Grans (Abs) 06/13/2023 0.01  0.00 - 0.04 K/uL Final    Lymph # 06/13/2023 1.9  1.0 - 4.8 K/uL Final    Mono # 06/13/2023 0.6  0.3 - 1.0 K/uL Final    Eos # 06/13/2023 0.2  0.0 - 0.5 K/uL Final    Baso # 06/13/2023 0.05  0.00 - 0.20 K/uL Final    nRBC 06/13/2023 0  0 /100 WBC Final    Gran % 06/13/2023 51.1  38.0 - 73.0 % Final    Lymph % 06/13/2023 33.9  18.0 - 48.0 % Final    Mono % 06/13/2023 10.5  4.0 - 15.0 % Final    Eosinophil % 06/13/2023 3.4  0.0 - 8.0 % Final    Basophil % 06/13/2023 0.9  0.0 - 1.9 % Final    Differential Method 06/13/2023 Automated   Final    Sodium 06/13/2023 142  136 - 145 mmol/L Final    Potassium 06/13/2023 4.8  3.5 - 5.1 mmol/L Final    Chloride 06/13/2023 106  95 - 110 mmol/L Final    CO2 06/13/2023 25  23 - 29 mmol/L Final    Glucose 06/13/2023 85  70 - 110 mg/dL Final    BUN 06/13/2023 23  8 - 23 mg/dL Final    Creatinine 06/13/2023 1.0  0.5 - 1.4 mg/dL Final    Calcium 06/13/2023 9.1  8.7 - 10.5 mg/dL Final    Anion Gap 06/13/2023 11  8 - 16 mmol/L Final    eGFR 06/13/2023 >60  >60 mL/min/1.73 m^2 Final   Office Visit on 05/26/2023   Component Date Value Ref Range Status    Color, UA 05/26/2023 Yellow   Final    pH, UA 05/26/2023 5   Final    WBC, UA 05/26/2023 trace   Final    Nitrite, UA 05/26/2023 negative   Final    Protein, POC 05/26/2023 +   Final    Glucose, UA 05/26/2023 negative   Final    Ketones, UA 05/26/2023 negative   Final    Urobilinogen, UA 05/26/2023 normal   Final     Bilirubin, POC 05/26/2023 negative   Final    Blood, UA 05/26/2023 +++   Final    Clarity, UA 05/26/2023 Cloudy   Final    Spec Grav UA 05/26/2023 1.005   Final    Urine Culture, Routine 05/26/2023 No significant growth   Final   Office Visit on 05/09/2023   Component Date Value Ref Range Status    Color, POC UA 05/09/2023 Yellow  Yellow, Straw, Colorless Final    Clarity, POC UA 05/09/2023 Clear  Clear Final    Glucose, POC UA 05/09/2023 Negative  Negative Final    Bilirubin, POC UA 05/09/2023 Negative  Negative Final    Ketones, POC UA 05/09/2023 Negative  Negative Final    Spec Grav POC UA 05/09/2023 1.025  1.005 - 1.030 Final    Blood, POC UA 05/09/2023 Large (A)  Negative Final    pH, POC UA 05/09/2023 5.5  5.0 - 8.0 Final    Protein, POC UA 05/09/2023 30 (A)  Negative Final    Urobilinogen, POC UA 05/09/2023 0.2  <=1.0 Final    Nitrite, POC UA 05/09/2023 Negative  Negative Final    WBC, POC UA 05/09/2023 Trace (A)  Negative Final   Admission on 05/08/2023, Discharged on 05/08/2023   Component Date Value Ref Range Status    Aerobic Bacterial Culture 05/08/2023 No growth   Final   Lab Visit on 05/04/2023   Component Date Value Ref Range Status    Creatinine 05/04/2023 1.0  0.5 - 1.4 mg/dL Final    eGFR 05/04/2023 >60  >60 mL/min/1.73 m^2 Final   Office Visit on 04/27/2023   Component Date Value Ref Range Status    Color, POC UA 04/27/2023 Yellow  Yellow, Straw, Colorless Final    Clarity, POC UA 04/27/2023 Clear  Clear Final    Glucose, POC UA 04/27/2023 Negative  Negative Final    Bilirubin, POC UA 04/27/2023 Negative  Negative Final    Ketones, POC UA 04/27/2023 Negative  Negative Final    Spec Grav POC UA 04/27/2023 1.025  1.005 - 1.030 Final    Blood, POC UA 04/27/2023 Large (A)  Negative Final    pH, POC UA 04/27/2023 5.5  5.0 - 8.0 Final    Protein, POC UA 04/27/2023 Trace (A)  Negative Final    Urobilinogen, POC UA 04/27/2023 0.2  <=1.0 Final    Nitrite, POC UA 04/27/2023 Negative  Negative Final    WBC,  POC UA 04/27/2023 Trace (A)  Negative Final    Urine Culture, Routine 04/27/2023 No growth   Final    RBC, UA 04/27/2023 20 (H)  0 - 4 /hpf Final    WBC, UA 04/27/2023 4  0 - 5 /hpf Final    Bacteria 04/27/2023 Rare  None-Occ /hpf Final    Microscopic Comment 04/27/2023 SEE COMMENT   Final    Final Pathologic Diagnosis 04/27/2023    Final                    Value:Negative for high grade urothelial carcinoma.  Red blood cells present      Disclaimer 04/27/2023 Screening was performed at [facility], [address], [place] [address].   Final   Lab Visit on 04/21/2023   Component Date Value Ref Range Status    WBC 04/21/2023 4.62  3.90 - 12.70 K/uL Final    RBC 04/21/2023 3.99 (L)  4.00 - 5.40 M/uL Final    Hemoglobin 04/21/2023 13.6  12.0 - 16.0 g/dL Final    Hematocrit 04/21/2023 41.6  37.0 - 48.5 % Final    MCV 04/21/2023 104 (H)  82 - 98 fL Final    MCH 04/21/2023 34.1 (H)  27.0 - 31.0 pg Final    MCHC 04/21/2023 32.7  32.0 - 36.0 g/dL Final    RDW 04/21/2023 11.9  11.5 - 14.5 % Final    Platelets 04/21/2023 242  150 - 450 K/uL Final    MPV 04/21/2023 10.4  9.2 - 12.9 fL Final    Immature Granulocytes 04/21/2023 0.2  0.0 - 0.5 % Final    Gran # (ANC) 04/21/2023 2.2  1.8 - 7.7 K/uL Final    Immature Grans (Abs) 04/21/2023 0.01  0.00 - 0.04 K/uL Final    Lymph # 04/21/2023 1.7  1.0 - 4.8 K/uL Final    Mono # 04/21/2023 0.4  0.3 - 1.0 K/uL Final    Eos # 04/21/2023 0.2  0.0 - 0.5 K/uL Final    Baso # 04/21/2023 0.07  0.00 - 0.20 K/uL Final    nRBC 04/21/2023 0  0 /100 WBC Final    Gran % 04/21/2023 47.6  38.0 - 73.0 % Final    Lymph % 04/21/2023 36.6  18.0 - 48.0 % Final    Mono % 04/21/2023 9.1  4.0 - 15.0 % Final    Eosinophil % 04/21/2023 5.0  0.0 - 8.0 % Final    Basophil % 04/21/2023 1.5  0.0 - 1.9 % Final    Differential Method 04/21/2023 Automated   Final    Cholesterol 04/21/2023 214 (H)  120 - 199 mg/dL Final    Triglycerides 04/21/2023 93  30 - 150 mg/dL  Final    HDL 2023 68  40 - 75 mg/dL Final    LDL Cholesterol 2023 127.4  63.0 - 159.0 mg/dL Final    HDL/Cholesterol Ratio 2023 31.8  20.0 - 50.0 % Final    Total Cholesterol/HDL Ratio 2023 3.1  2.0 - 5.0 Final    Non-HDL Cholesterol 2023 146  mg/dL Final    Vit D, 25-Hydroxy 2023 50  30 - 96 ng/mL Final    TSH 2023 5.660 (H)  0.340 - 5.600 uIU/mL Final    Free T4 2023 0.89  0.71 - 1.51 ng/dL Final    RBC, UA 2023 39 (H)  0 - 4 /hpf Final    WBC, UA 2023 9 (H)  0 - 5 /hpf Final    Bacteria 2023 Negative  None-Occ /hpf Final    Squam Epithel, UA 2023 2  /hpf Final    Hyaline Casts, UA 2023 3 (A)  0-1/lpf /lpf Final    Microscopic Comment 2023 SEE COMMENT   Final       Past Medical History:   Diagnosis Date    Arthritis     Back pain     Depression     Hypertension     Hypothyroidism     Kidney stones     MVP (mitral valve prolapse)     hx - palpitations     Social History     Socioeconomic History    Marital status:    Tobacco Use    Smoking status: Never    Smokeless tobacco: Never   Substance and Sexual Activity    Alcohol use: Yes     Alcohol/week: 2.0 standard drinks of alcohol     Types: 2 Glasses of wine per week     Comment: nightly    Drug use: No    Sexual activity: Yes     Partners: Male     Social Determinants of Health     Stress: No Stress Concern Present (2020)    [employer] - Occupational Stress Questionnaire     Feeling of Stress : Not at all     Past Surgical History:   Procedure Laterality Date     SECTION      DRAINAGE Right 2023    Procedure: Drainage;  Surgeon: Jesus Reeves MD;  Location: [facility] CATH LAB;  Service: Radiology;  Laterality: Right;    NEPHROSTOGRAPHY Left 2023    Procedure: Nephrostogram;  Surgeon: Otoniel Hawk II, MD;  Location: [facility] CATH LAB;  Service: Interventional Radiology;  Laterality: Left;    PERCUTANEOUS NEPHROLITHOTOMY  Right 6/20/2023    Procedure: NEPHROLITHOTOMY, PERCUTANEOUS;  Surgeon: Bert Anaya MD;  Location: [facility] OR;  Service: Urology;  Laterality: Right;  Pt will be inpatient. IR will place nephrostomy day prior to surgery.2 HOURS    PERCUTANEOUS NEPHROSTOMY Right 6/19/2023    Procedure: Creation, Nephrostomy, Percutaneous;  Surgeon: Jesus Reeves MD;  Location: [facility] CATH LAB;  Service: Radiology;  Laterality: Right;     Family History   Problem Relation Age of Onset    Cancer Mother     COPD Mother     Heart disease Mother     Hypertension Mother     Depression Mother     Hearing loss Mother     Emphysema Father     Breast cancer Paternal Aunt        All of your core healthy metrics are met.      Review of patient's allergies indicates:   Allergen Reactions    Imitrex [sumatriptan] Anaphylaxis    Methylprednisolone Other (See Comments)     Flush and hot sweats    Egg Nausea Only    Penicillins Rash    Sulfa (sulfonamide antibiotics) Rash       Current Outpatient Medications:     acetaminophen (TYLENOL) 500 MG tablet, Take 500 mg by mouth every 6 (six) hours as needed for Pain., Disp: , Rfl:     B COMPLEX-VITAMIN B12 tablet, 1 tablet  once a day, Disp: , Rfl:     cetirizine (ZYRTEC) 10 MG tablet, Take 10 mg by mouth once daily., Disp: , Rfl:     codeine-butalbital-ASA-caffeine (BUTALBITAL COMPOUND W/CODEINE) 55--40 mg Cap, Take 1 capsule by mouth every 4 (four) hours as needed., Disp: 30 capsule, Rfl: 0    diclofenac sodium (VOLTAREN) 1 % Gel, Apply topically 2 (two) times daily as needed., Disp: 100 g, Rfl: 0    folic acid (FOLVITE) 400 MCG tablet, Take 400 mcg by mouth once daily., Disp: , Rfl:     gabapentin (NEURONTIN) 300 MG capsule, Take 1 capsule (300 mg total) by mouth 2 (two) times daily., Disp: 180 capsule, Rfl: 1    ketoconazole (NIZORAL) 2 % cream, Apply topically once daily., Disp: 60 g, Rfl: 0    mupirocin (BACTROBAN) 2 % ointment, Apply topically 3 (three) times daily., Disp: 22 g, Rfl:  "0    triamcinolone (NASACORT) 55 mcg nasal inhaler, 2 sprays by Nasal route once daily., Disp: 1 g, Rfl: 6    UNITHROID 100 mcg tablet, Take 100 mcg by mouth once daily., Disp: , Rfl:     UNITHROID 112 mcg tablet, Take 112 mcg by mouth once daily., Disp: , Rfl:     amLODIPine (NORVASC) 5 MG tablet, Take 1 tablet (5 mg total) by mouth once daily., Disp: 90 tablet, Rfl: 1    ascorbic acid, vitamin C, (VITAMIN C) 500 MG tablet, Take 500 mg by mouth 2 (two) times a day., Disp: , Rfl:     buPROPion (WELLBUTRIN XL) 300 MG 24 hr tablet, Take 1 tablet (300 mg total) by mouth once daily., Disp: 90 tablet, Rfl: 1    EScitalopram oxalate (LEXAPRO) 20 MG tablet, Take 1 tablet (20 mg total) by mouth once daily., Disp: 90 tablet, Rfl: 1    ferrous sulfate (SLOW FE ORAL), Take by mouth., Disp: , Rfl:     metoprolol tartrate (LOPRESSOR) 50 MG tablet, Take 1 tablet (50 mg total) by mouth once daily., Disp: 90 tablet, Rfl: 1    vitamin D (VITAMIN D3) 1000 units Tab, Take 1,000 Units by mouth once daily., Disp: , Rfl:     Review of Systems   Constitutional:  Negative for chills, fever and unexpected weight change.   HENT:  Negative for ear pain, rhinorrhea and sore throat.    Eyes:  Negative for pain and visual disturbance.   Respiratory:  Negative for cough and shortness of breath.    Cardiovascular:  Negative for chest pain, palpitations and leg swelling.   Gastrointestinal:  Negative for abdominal pain, diarrhea, nausea and vomiting.   Genitourinary:  Negative for difficulty urinating, hematuria and vaginal bleeding.   Musculoskeletal:  Negative for arthralgias.   Skin:  Negative for rash.   Neurological:  Negative for dizziness, weakness and headaches.   Psychiatric/Behavioral:  Negative for agitation and sleep disturbance. The patient is not nervous/anxious.           Objective:      Vitals:    09/12/23 1342   BP: 112/60   Pulse: 70   SpO2: 99%   Weight: 68 kg (150 lb)   Height: 5' 5" (1.651 m)     Physical Exam  Vitals and " nursing note reviewed.   Constitutional:       General: She is not in acute distress.     Appearance: Normal appearance. She is well-developed.   HENT:      Head: Normocephalic.      Right Ear: External ear normal.      Left Ear: External ear normal.   Eyes:      Conjunctiva/sclera: Conjunctivae normal.      Pupils: Pupils are equal, round, and reactive to light.   Neck:      Vascular: No JVD.   Cardiovascular:      Rate and Rhythm: Normal rate and regular rhythm.      Heart sounds: No murmur heard.  Pulmonary:      Effort: Pulmonary effort is normal.      Breath sounds: Normal breath sounds.   Abdominal:      General: Bowel sounds are normal.      Palpations: Abdomen is soft.   Musculoskeletal:         General: No deformity. Normal range of motion.      Cervical back: Normal range of motion and neck supple.   Lymphadenopathy:      Cervical: No cervical adenopathy.   Skin:     General: Skin is warm and dry.      Findings: No rash.   Neurological:      Mental Status: She is alert and oriented to person, place, and time.      Gait: Gait normal.   Psychiatric:         Speech: Speech normal.         Behavior: Behavior normal.           Assessment:       1. Neuropathy    2. Hypothyroidism due to acquired atrophy of thyroid    3. Anemia, unspecified type    4. High risk medication use    5. B12 deficiency    6. Vitamin D deficiency    7. Anxiety    8. Intractable tension-type headache, unspecified chronicity pattern    9. Hypertension, essential         Plan:       Neuropathy  Comments:  gabapentin    Hypothyroidism due to acquired atrophy of thyroid  Comments:  unithroid. alternating doses  Orders:  -     TSH w/reflex to FT4; Future; Expected date: 09/12/2023    Anemia, unspecified type  -     CBC Auto Differential; Future; Expected date: 09/12/2023  -     Ferritin; Future; Expected date: 09/12/2023  -     Comprehensive Metabolic Panel; Future; Expected date: 09/12/2023    High risk medication use  -     CBC Auto  Differential; Future; Expected date: 09/12/2023  -     Ferritin; Future; Expected date: 09/12/2023  -     Hemoglobin A1C; Future; Expected date: 09/12/2023    B12 deficiency  -     Vitamin B12; Future; Expected date: 09/12/2023  -     Hemoglobin A1C; Future; Expected date: 09/12/2023    Vitamin D deficiency    Anxiety  Comments:  manageable    Intractable tension-type headache, unspecified chronicity pattern    Hypertension, essential  Comments:  bp is well controlled    Other orders  -     Hemoglobin A1C      Follow up in about 6 months (around 3/12/2024), or if symptoms worsen or fail to improve, for medication management.        9/26/2023 Lizy Noble

## 2023-09-27 ENCOUNTER — TELEPHONE (OUTPATIENT)
Dept: FAMILY MEDICINE | Facility: CLINIC | Age: 68
End: 2023-09-27

## 2023-09-27 NOTE — TELEPHONE ENCOUNTER
----- Message from Lizy Noble NP sent at 9/26/2023 12:41 AM CDT -----  Blood count has improved. Iron level is still low. Start slow fe (over the counter) take with vitamin c. Liver enzymes are elevated. Decrease alcohol if any. Low fat diet. B12 level is normal. Hga1c is 5.2mg/dl. rest of labs are normal. Repeat ferritin , cmp, hepatitis panel.

## 2023-10-03 ENCOUNTER — TELEPHONE (OUTPATIENT)
Dept: FAMILY MEDICINE | Facility: CLINIC | Age: 68
End: 2023-10-03

## 2023-10-03 RX ORDER — BUTALBITAL, ASPIRIN, CAFFEINE AND CODEINE PHOSPHATE 50; 325; 40; 30 MG/1; MG/1; MG/1; MG/1
1 CAPSULE ORAL EVERY 4 HOURS PRN
Qty: 30 CAPSULE | Refills: 0 | Status: SHIPPED | OUTPATIENT
Start: 2023-10-03 | End: 2023-11-06 | Stop reason: SDUPTHER

## 2023-10-03 NOTE — TELEPHONE ENCOUNTER
----- Message from Lisa Montana MA sent at 10/3/2023 11:29 AM CDT -----    ----- Message -----  From: Monik Jaramillo  Sent: 10/3/2023  11:23 AM CDT  To: [facility] Staff    - 10:58-pt needs refill on butalbital   [phone]

## 2023-10-13 ENCOUNTER — OFFICE VISIT (OUTPATIENT)
Dept: UROLOGY | Facility: CLINIC | Age: 68
End: 2023-10-13
Payer: MEDICARE

## 2023-10-13 VITALS
BODY MASS INDEX: 25.38 KG/M2 | HEART RATE: 70 BPM | SYSTOLIC BLOOD PRESSURE: 130 MMHG | DIASTOLIC BLOOD PRESSURE: 78 MMHG | WEIGHT: 152.31 LBS | HEIGHT: 65 IN

## 2023-10-13 DIAGNOSIS — R82.991 HYPOCITRATURIA: ICD-10-CM

## 2023-10-13 DIAGNOSIS — N20.0 NEPHROLITHIASIS: Primary | ICD-10-CM

## 2023-10-13 PROCEDURE — 3044F PR MOST RECENT HEMOGLOBIN A1C LEVEL <7.0%: ICD-10-PCS | Mod: CPTII,S$GLB,, | Performed by: UROLOGY

## 2023-10-13 PROCEDURE — 3078F DIAST BP <80 MM HG: CPT | Mod: CPTII,S$GLB,, | Performed by: UROLOGY

## 2023-10-13 PROCEDURE — 1126F AMNT PAIN NOTED NONE PRSNT: CPT | Mod: CPTII,S$GLB,, | Performed by: UROLOGY

## 2023-10-13 PROCEDURE — 99999 PR PBB SHADOW E&M-EST. PATIENT-LVL IV: ICD-10-PCS | Mod: PBBFAC,,, | Performed by: UROLOGY

## 2023-10-13 PROCEDURE — 3288F PR FALLS RISK ASSESSMENT DOCUMENTED: ICD-10-PCS | Mod: CPTII,S$GLB,, | Performed by: UROLOGY

## 2023-10-13 PROCEDURE — 3288F FALL RISK ASSESSMENT DOCD: CPT | Mod: CPTII,S$GLB,, | Performed by: UROLOGY

## 2023-10-13 PROCEDURE — 3008F BODY MASS INDEX DOCD: CPT | Mod: CPTII,S$GLB,, | Performed by: UROLOGY

## 2023-10-13 PROCEDURE — 1160F RVW MEDS BY RX/DR IN RCRD: CPT | Mod: CPTII,S$GLB,, | Performed by: UROLOGY

## 2023-10-13 PROCEDURE — 3075F PR MOST RECENT SYSTOLIC BLOOD PRESS GE 130-139MM HG: ICD-10-PCS | Mod: CPTII,S$GLB,, | Performed by: UROLOGY

## 2023-10-13 PROCEDURE — 1101F PR PT FALLS ASSESS DOC 0-1 FALLS W/OUT INJ PAST YR: ICD-10-PCS | Mod: CPTII,S$GLB,, | Performed by: UROLOGY

## 2023-10-13 PROCEDURE — 99214 PR OFFICE/OUTPT VISIT, EST, LEVL IV, 30-39 MIN: ICD-10-PCS | Mod: S$GLB,,, | Performed by: UROLOGY

## 2023-10-13 PROCEDURE — 99214 OFFICE O/P EST MOD 30 MIN: CPT | Mod: S$GLB,,, | Performed by: UROLOGY

## 2023-10-13 PROCEDURE — 1126F PR PAIN SEVERITY QUANTIFIED, NO PAIN PRESENT: ICD-10-PCS | Mod: CPTII,S$GLB,, | Performed by: UROLOGY

## 2023-10-13 PROCEDURE — 99999 PR PBB SHADOW E&M-EST. PATIENT-LVL IV: CPT | Mod: PBBFAC,,, | Performed by: UROLOGY

## 2023-10-13 PROCEDURE — 3075F SYST BP GE 130 - 139MM HG: CPT | Mod: CPTII,S$GLB,, | Performed by: UROLOGY

## 2023-10-13 PROCEDURE — 1159F PR MEDICATION LIST DOCUMENTED IN MEDICAL RECORD: ICD-10-PCS | Mod: CPTII,S$GLB,, | Performed by: UROLOGY

## 2023-10-13 PROCEDURE — 3044F HG A1C LEVEL LT 7.0%: CPT | Mod: CPTII,S$GLB,, | Performed by: UROLOGY

## 2023-10-13 PROCEDURE — 3008F PR BODY MASS INDEX (BMI) DOCUMENTED: ICD-10-PCS | Mod: CPTII,S$GLB,, | Performed by: UROLOGY

## 2023-10-13 PROCEDURE — 1159F MED LIST DOCD IN RCRD: CPT | Mod: CPTII,S$GLB,, | Performed by: UROLOGY

## 2023-10-13 PROCEDURE — 1160F PR REVIEW ALL MEDS BY PRESCRIBER/CLIN PHARMACIST DOCUMENTED: ICD-10-PCS | Mod: CPTII,S$GLB,, | Performed by: UROLOGY

## 2023-10-13 PROCEDURE — 3078F PR MOST RECENT DIASTOLIC BLOOD PRESSURE < 80 MM HG: ICD-10-PCS | Mod: CPTII,S$GLB,, | Performed by: UROLOGY

## 2023-10-13 PROCEDURE — 1101F PT FALLS ASSESS-DOCD LE1/YR: CPT | Mod: CPTII,S$GLB,, | Performed by: UROLOGY

## 2023-10-13 RX ORDER — SODIUM FLUORIDE 5 MG/ML
PASTE, DENTIFRICE DENTAL NIGHTLY
COMMUNITY
Start: 2023-09-30

## 2023-10-13 RX ORDER — POTASSIUM CITRATE 15 MEQ/1
30 TABLET, EXTENDED RELEASE ORAL 2 TIMES DAILY
Qty: 120 TABLET | Refills: 11 | Status: SHIPPED | OUTPATIENT
Start: 2023-10-13 | End: 2024-10-12

## 2023-10-13 NOTE — PROGRESS NOTES
"Subjective:      Eulalia Bermudez is a 68 y.o. female who returns today regarding her nephrolithiasis.    She is s/p right PCNL at [facility] on 6/20/23. Also had peripelvic cyst drained by IR at time of PCN placement. Procedure was uncomplicated and she has no c/o now.    She does have prior h/o nephrolithiasis but has never had stone analysis or metabolic workup.    Stone from PCNL was 80% UA, 20% CaOx.     Here to review metabolic workup.    The following portions of the patient's history were reviewed and updated as appropriate: allergies, current medications, past family history, past medical history, past social history, past surgical history and problem list.    Review of Systems  A comprehensive multipoint review of systems was negative except as otherwise stated in the HPI.     Objective:   Vitals: /78 (BP Location: Right arm, Patient Position: Sitting, BP Method: Small (Automatic))   Pulse 70   Ht 5' 5" (1.651 m)   Wt 69.1 kg (152 lb 5.4 oz)   BMI 25.35 kg/m²     Physical Exam   General: alert and oriented, no acute distress  Respiratory: Symmetric expansion, non-labored breathing  Neuro: no gross deficits  Psych: normal judgment and insight, normal mood/affect, and non-anxious    Lab Review     Lab Results   Component Value Date    WBC 4.6 09/12/2023    HGB 11.4 (L) 09/12/2023    HCT 36.9 09/12/2023    MCV 91.6 09/12/2023     09/12/2023     Lab Results   Component Value Date    CREATININE 1.08 (H) 09/12/2023    CREATININE 1.04 11/13/2018    BUN 23 09/12/2023     Stone Analysis: 80% uric acid, 20% CaOx    24 Hour Stone Profile:  Date: 8/22/23 -- Vol 1.86 L, Ca 134 mg/day, Ox 16 mg/day, Cit 327 mg/day, pH 5.325, Na 74, K 39, Mg 64, Cr 810     Assessment and Plan:   1. Nephrolithiasis  -- Metabolic workup reviewed - see plan below  -- FU 3 mos w/ repeat [facility]ol[facility]    2. Low urine pH  -- Explained this is primary etiology for UA stones and treatment should prevent further growth  -- Will start " KCit 30 mEq BID    3. Hypocitraturia  -- KCit as per above

## 2023-10-17 ENCOUNTER — TELEPHONE (OUTPATIENT)
Dept: FAMILY MEDICINE | Facility: CLINIC | Age: 68
End: 2023-10-17

## 2023-10-17 DIAGNOSIS — Z79.899 HIGH RISK MEDICATION USE: ICD-10-CM

## 2023-10-17 DIAGNOSIS — D64.9 ANEMIA, UNSPECIFIED TYPE: Primary | ICD-10-CM

## 2023-10-17 NOTE — TELEPHONE ENCOUNTER
Spoke to patient that lab is due to recheck iron levels and she does not need to fast. Advised her that the lab will give her a stool card to bring home, provide sample, and bring back to our lab. Asked her not to mail as it won't get delivered. Updated remind me

## 2023-10-17 NOTE — TELEPHONE ENCOUNTER
----- Message from [facility]  sent at 10/17/2023  8:09 AM CDT -----    ----- Message -----  From: Tressa Greco MA  Sent: 10/4/2023  12:00 AM CDT  To: Lizy Noble Staff    Lizy Noble, NP Nurse Practitioner Signed  2:47 PM      Start slow fe with vitamin c bid. Stool for blood, cbc ferritin in 3 weeks

## 2023-10-24 ENCOUNTER — TELEPHONE (OUTPATIENT)
Dept: FAMILY MEDICINE | Facility: CLINIC | Age: 68
End: 2023-10-24

## 2023-10-24 LAB
BASOPHILS # BLD AUTO: 61 CELLS/UL (ref 0–200)
BASOPHILS NFR BLD AUTO: 1.2 %
EOSINOPHIL # BLD AUTO: 184 CELLS/UL (ref 15–500)
EOSINOPHIL NFR BLD AUTO: 3.6 %
ERYTHROCYTE [DISTWIDTH] IN BLOOD BY AUTOMATED COUNT: 18.3 % (ref 11–15)
FERRITIN SERPL-MCNC: 23 NG/ML (ref 16–288)
HCT VFR BLD AUTO: 41.9 % (ref 35–45)
HGB BLD-MCNC: 13.1 G/DL (ref 11.7–15.5)
LYMPHOCYTES # BLD AUTO: 1525 CELLS/UL (ref 850–3900)
LYMPHOCYTES NFR BLD AUTO: 29.9 %
MCH RBC QN AUTO: 29.3 PG (ref 27–33)
MCHC RBC AUTO-ENTMCNC: 31.3 G/DL (ref 32–36)
MCV RBC AUTO: 93.7 FL (ref 80–100)
MONOCYTES # BLD AUTO: 454 CELLS/UL (ref 200–950)
MONOCYTES NFR BLD AUTO: 8.9 %
NEUTROPHILS # BLD AUTO: 2876 CELLS/UL (ref 1500–7800)
NEUTROPHILS NFR BLD AUTO: 56.4 %
PLATELET # BLD AUTO: 254 THOUSAND/UL (ref 140–400)
PMV BLD REES-ECKER: 10.8 FL (ref 7.5–12.5)
RBC # BLD AUTO: 4.47 MILLION/UL (ref 3.8–5.1)
WBC # BLD AUTO: 5.1 THOUSAND/UL (ref 3.8–10.8)

## 2023-10-24 NOTE — TELEPHONE ENCOUNTER
Spoke to patient with result per Lizy. Said she is concerned because it is on the low end of normal. Still on Slow FE and Vitamin C. Wants to continue this and recheck lab at some point. When would Lizy like to do this? Said she is still feeling fatigued and napping all the time.

## 2023-11-01 ENCOUNTER — TELEPHONE (OUTPATIENT)
Dept: FAMILY MEDICINE | Facility: CLINIC | Age: 68
End: 2023-11-01

## 2023-11-01 NOTE — TELEPHONE ENCOUNTER
----- Message from [facility]  sent at 10/17/2023  8:09 AM CDT -----    ----- Message -----  From: Tressa Greco MA  Sent: 10/4/2023  12:00 AM CDT  To: Lizy Noble Staff    Lizy Noble, NP Nurse Practitioner Signed  2:47 PM      Start slow fe with vitamin c bid. Stool for blood, cbc ferritin in 3 weeks

## 2023-11-01 NOTE — TELEPHONE ENCOUNTER
Had lab but no stool result yet.Called patient to see status of that. Said she will drop it off Friday morning or early next week. Updated remind me.

## 2023-11-06 NOTE — TELEPHONE ENCOUNTER
----- Message from Arlene Dudley sent at 11/6/2023  2:04 PM CST -----  Vm:201    Refill on her codeine-butalbital-ASA-caffeine      [phone]

## 2023-11-07 ENCOUNTER — TELEPHONE (OUTPATIENT)
Dept: FAMILY MEDICINE | Facility: CLINIC | Age: 68
End: 2023-11-07

## 2023-11-07 LAB — HEMOCCULT STL QL IA: NOT DETECTED

## 2023-11-07 RX ORDER — BUTALBITAL, ASPIRIN, CAFFEINE AND CODEINE PHOSPHATE 50; 325; 40; 30 MG/1; MG/1; MG/1; MG/1
1 CAPSULE ORAL EVERY 4 HOURS PRN
Qty: 30 CAPSULE | Refills: 0 | Status: SHIPPED | OUTPATIENT
Start: 2023-11-07 | End: 2023-12-04 | Stop reason: SDUPTHER

## 2023-11-07 NOTE — TELEPHONE ENCOUNTER
----- Message from Monik Jaramillo sent at 11/7/2023  3:24 PM CST -----  - 2:17-pt needs refill on butalbital   [facility]   [phone]

## 2023-11-10 LAB — HEMOCCULT STL QL IA: NORMAL

## 2023-11-13 ENCOUNTER — TELEPHONE (OUTPATIENT)
Dept: FAMILY MEDICINE | Facility: CLINIC | Age: 68
End: 2023-11-13

## 2023-11-13 ENCOUNTER — PATIENT MESSAGE (OUTPATIENT)
Dept: UROLOGY | Facility: CLINIC | Age: 68
End: 2023-11-13
Payer: MEDICARE

## 2023-11-21 ENCOUNTER — TELEPHONE (OUTPATIENT)
Dept: FAMILY MEDICINE | Facility: CLINIC | Age: 68
End: 2023-11-21

## 2023-11-21 DIAGNOSIS — R79.0 DECREASED FERRITIN: Primary | ICD-10-CM

## 2023-11-21 NOTE — TELEPHONE ENCOUNTER
----- Message from [facility], RT sent at 10/24/2023 12:44 PM CDT -----  Regarding: iron labs due. See what Lizy wants to order  10/24/23 Lizy Noble NP 22 minutes ago (12:21 PM)  Ok to continue. Repeat in 1 month    MW  Spoke to patient with result per Lizy. Said she is concerned because it is on the low end of normal. Still on Slow FE and Vitamin C. Wants to continue this and recheck lab at some point. When would Lizy like to do this? Said she is still feeling fatigued and napping all the time.

## 2023-11-21 NOTE — TELEPHONE ENCOUNTER
Spoke to patient that iron labs are due and she does not need to fast. Uses [facility]. To Lizy to order what she would like. Updated remind me.

## 2023-11-29 LAB
BASOPHILS # BLD AUTO: 59 CELLS/UL (ref 0–200)
BASOPHILS NFR BLD AUTO: 0.9 %
EOSINOPHIL # BLD AUTO: 221 CELLS/UL (ref 15–500)
EOSINOPHIL NFR BLD AUTO: 3.4 %
ERYTHROCYTE [DISTWIDTH] IN BLOOD BY AUTOMATED COUNT: 15.9 % (ref 11–15)
FERRITIN SERPL-MCNC: 30 NG/ML (ref 16–288)
HCT VFR BLD AUTO: 43.3 % (ref 35–45)
HGB BLD-MCNC: 14.4 G/DL (ref 11.7–15.5)
LYMPHOCYTES # BLD AUTO: 2041 CELLS/UL (ref 850–3900)
LYMPHOCYTES NFR BLD AUTO: 31.4 %
MCH RBC QN AUTO: 31.4 PG (ref 27–33)
MCHC RBC AUTO-ENTMCNC: 33.3 G/DL (ref 32–36)
MCV RBC AUTO: 94.5 FL (ref 80–100)
MONOCYTES # BLD AUTO: 618 CELLS/UL (ref 200–950)
MONOCYTES NFR BLD AUTO: 9.5 %
NEUTROPHILS # BLD AUTO: 3562 CELLS/UL (ref 1500–7800)
NEUTROPHILS NFR BLD AUTO: 54.8 %
PLATELET # BLD AUTO: 291 THOUSAND/UL (ref 140–400)
PMV BLD REES-ECKER: 10.4 FL (ref 7.5–12.5)
RBC # BLD AUTO: 4.58 MILLION/UL (ref 3.8–5.1)
WBC # BLD AUTO: 6.5 THOUSAND/UL (ref 3.8–10.8)

## 2023-11-30 ENCOUNTER — TELEPHONE (OUTPATIENT)
Dept: FAMILY MEDICINE | Facility: CLINIC | Age: 68
End: 2023-11-30

## 2023-11-30 NOTE — TELEPHONE ENCOUNTER
----- Message from Lizy Noble NP sent at 11/29/2023  9:05 PM CST -----  Anemia has resolved. Iron level is now within normal limits.    Unna Boot Text: An Unna boot was placed to help immobilize the limb and facilitate more rapid healing.

## 2023-12-04 RX ORDER — BUTALBITAL, ASPIRIN, CAFFEINE AND CODEINE PHOSPHATE 50; 325; 40; 30 MG/1; MG/1; MG/1; MG/1
1 CAPSULE ORAL EVERY 6 HOURS PRN
Qty: 30 CAPSULE | Refills: 0 | Status: SHIPPED | OUTPATIENT
Start: 2023-12-07 | End: 2023-12-27 | Stop reason: SDUPTHER

## 2023-12-04 NOTE — TELEPHONE ENCOUNTER
----- Message from Monik Jaramillo sent at 12/4/2023 11:16 AM CST -----  - 11:14-pt needs refill on butalbital   [facility]   [phone]

## 2023-12-27 RX ORDER — BUTALBITAL, ASPIRIN, CAFFEINE AND CODEINE PHOSPHATE 50; 325; 40; 30 MG/1; MG/1; MG/1; MG/1
1 CAPSULE ORAL EVERY 6 HOURS PRN
Qty: 30 CAPSULE | Refills: 0 | Status: SHIPPED | OUTPATIENT
Start: 2023-12-27 | End: 2024-01-31 | Stop reason: SDUPTHER

## 2024-01-02 ENCOUNTER — PATIENT MESSAGE (OUTPATIENT)
Dept: UROLOGY | Facility: CLINIC | Age: 69
End: 2024-01-02
Payer: MEDICARE

## 2024-01-08 ENCOUNTER — PATIENT MESSAGE (OUTPATIENT)
Dept: UROLOGY | Facility: CLINIC | Age: 69
End: 2024-01-08
Payer: MEDICARE

## 2024-01-31 RX ORDER — BUTALBITAL, ASPIRIN, CAFFEINE AND CODEINE PHOSPHATE 50; 325; 40; 30 MG/1; MG/1; MG/1; MG/1
1 CAPSULE ORAL EVERY 6 HOURS PRN
Qty: 30 CAPSULE | Refills: 0 | Status: SHIPPED | OUTPATIENT
Start: 2024-01-31 | End: 2024-02-29 | Stop reason: SDUPTHER

## 2024-01-31 NOTE — TELEPHONE ENCOUNTER
----- Message from Monik Jaramillo sent at 1/31/2024  2:59 PM CST -----  Vm- 2:08- pt needs refill on butalbital   [facility]   [phone]

## 2024-02-09 ENCOUNTER — LAB VISIT (OUTPATIENT)
Dept: LAB | Facility: HOSPITAL | Age: 69
End: 2024-02-09
Attending: INTERNAL MEDICINE
Payer: MEDICARE

## 2024-02-09 DIAGNOSIS — E03.9 MYXEDEMA HEART DISEASE: Primary | ICD-10-CM

## 2024-02-09 DIAGNOSIS — I51.9 MYXEDEMA HEART DISEASE: ICD-10-CM

## 2024-02-09 DIAGNOSIS — E55.9 AVITAMINOSIS D: ICD-10-CM

## 2024-02-09 DIAGNOSIS — E03.9 MYXEDEMA HEART DISEASE: ICD-10-CM

## 2024-02-09 DIAGNOSIS — I51.9 MYXEDEMA HEART DISEASE: Primary | ICD-10-CM

## 2024-02-09 LAB
25(OH)D3+25(OH)D2 SERPL-MCNC: 30 NG/ML (ref 30–96)
T4 FREE SERPL-MCNC: 0.79 NG/DL (ref 0.71–1.51)
TSH SERPL DL<=0.005 MIU/L-ACNC: 2.03 UIU/ML (ref 0.34–5.6)

## 2024-02-09 PROCEDURE — 82306 VITAMIN D 25 HYDROXY: CPT | Performed by: INTERNAL MEDICINE

## 2024-02-09 PROCEDURE — 84439 ASSAY OF FREE THYROXINE: CPT | Performed by: INTERNAL MEDICINE

## 2024-02-09 PROCEDURE — 36415 COLL VENOUS BLD VENIPUNCTURE: CPT | Performed by: INTERNAL MEDICINE

## 2024-02-09 PROCEDURE — 84443 ASSAY THYROID STIM HORMONE: CPT | Performed by: INTERNAL MEDICINE

## 2024-02-29 RX ORDER — BUTALBITAL, ASPIRIN, CAFFEINE AND CODEINE PHOSPHATE 50; 325; 40; 30 MG/1; MG/1; MG/1; MG/1
1 CAPSULE ORAL EVERY 6 HOURS PRN
Qty: 30 CAPSULE | Refills: 0 | Status: SHIPPED | OUTPATIENT
Start: 2024-03-03 | End: 2024-04-01 | Stop reason: SDUPTHER

## 2024-02-29 NOTE — TELEPHONE ENCOUNTER
----- Message from Arlene Dudley sent at 2/29/2024 11:46 AM CST -----  Pt needs a refill on brutetol with Asprin to be sent to [facility] on [address] [address].    [phone]

## 2024-03-06 ENCOUNTER — PATIENT MESSAGE (OUTPATIENT)
Dept: FAMILY MEDICINE | Facility: CLINIC | Age: 69
End: 2024-03-06
Payer: MEDICARE

## 2024-03-12 ENCOUNTER — OFFICE VISIT (OUTPATIENT)
Dept: FAMILY MEDICINE | Facility: CLINIC | Age: 69
End: 2024-03-12
Payer: MEDICARE

## 2024-03-12 VITALS
BODY MASS INDEX: 25.66 KG/M2 | SYSTOLIC BLOOD PRESSURE: 130 MMHG | DIASTOLIC BLOOD PRESSURE: 70 MMHG | HEART RATE: 68 BPM | WEIGHT: 154 LBS | HEIGHT: 65 IN | OXYGEN SATURATION: 95 %

## 2024-03-12 DIAGNOSIS — Z12.31 ENCOUNTER FOR SCREENING MAMMOGRAM FOR MALIGNANT NEOPLASM OF BREAST: ICD-10-CM

## 2024-03-12 DIAGNOSIS — F32.1 MODERATE MAJOR DEPRESSION: ICD-10-CM

## 2024-03-12 DIAGNOSIS — M62.838 MUSCLE SPASM: ICD-10-CM

## 2024-03-12 DIAGNOSIS — G43.009 MIGRAINE WITHOUT AURA AND WITHOUT STATUS MIGRAINOSUS, NOT INTRACTABLE: ICD-10-CM

## 2024-03-12 DIAGNOSIS — Z78.0 ENCOUNTER FOR OSTEOPOROSIS SCREENING IN ASYMPTOMATIC POSTMENOPAUSAL PATIENT: ICD-10-CM

## 2024-03-12 DIAGNOSIS — J30.1 SEASONAL ALLERGIC RHINITIS DUE TO POLLEN: ICD-10-CM

## 2024-03-12 DIAGNOSIS — Z00.00 PHYSICAL EXAM: ICD-10-CM

## 2024-03-12 DIAGNOSIS — D64.9 ANEMIA, UNSPECIFIED TYPE: Primary | ICD-10-CM

## 2024-03-12 DIAGNOSIS — Z79.899 HIGH RISK MEDICATION USE: ICD-10-CM

## 2024-03-12 DIAGNOSIS — Z12.39 ENCOUNTER FOR SCREENING FOR MALIGNANT NEOPLASM OF BREAST, UNSPECIFIED SCREENING MODALITY: ICD-10-CM

## 2024-03-12 DIAGNOSIS — I10 HYPERTENSION, ESSENTIAL: ICD-10-CM

## 2024-03-12 DIAGNOSIS — Z13.820 ENCOUNTER FOR OSTEOPOROSIS SCREENING IN ASYMPTOMATIC POSTMENOPAUSAL PATIENT: ICD-10-CM

## 2024-03-12 DIAGNOSIS — Z78.0 MENOPAUSE: ICD-10-CM

## 2024-03-12 DIAGNOSIS — E03.9 HYPOTHYROIDISM, UNSPECIFIED TYPE: ICD-10-CM

## 2024-03-12 PROCEDURE — 3075F SYST BP GE 130 - 139MM HG: CPT | Mod: CPTII,S$GLB,, | Performed by: NURSE PRACTITIONER

## 2024-03-12 PROCEDURE — 1125F AMNT PAIN NOTED PAIN PRSNT: CPT | Mod: CPTII,S$GLB,, | Performed by: NURSE PRACTITIONER

## 2024-03-12 PROCEDURE — 99397 PER PM REEVAL EST PAT 65+ YR: CPT | Mod: S$GLB,,, | Performed by: NURSE PRACTITIONER

## 2024-03-12 PROCEDURE — 3288F FALL RISK ASSESSMENT DOCD: CPT | Mod: CPTII,S$GLB,, | Performed by: NURSE PRACTITIONER

## 2024-03-12 PROCEDURE — 1101F PT FALLS ASSESS-DOCD LE1/YR: CPT | Mod: CPTII,S$GLB,, | Performed by: NURSE PRACTITIONER

## 2024-03-12 PROCEDURE — 3008F BODY MASS INDEX DOCD: CPT | Mod: CPTII,S$GLB,, | Performed by: NURSE PRACTITIONER

## 2024-03-12 PROCEDURE — 3078F DIAST BP <80 MM HG: CPT | Mod: CPTII,S$GLB,, | Performed by: NURSE PRACTITIONER

## 2024-03-12 RX ORDER — BUPROPION HYDROCHLORIDE 300 MG/1
300 TABLET ORAL DAILY
Qty: 90 TABLET | Refills: 1 | Status: SHIPPED | OUTPATIENT
Start: 2024-03-12

## 2024-03-12 RX ORDER — PROMETHAZINE HYDROCHLORIDE 25 MG/1
25 TABLET ORAL EVERY 6 HOURS PRN
COMMUNITY
End: 2024-03-16 | Stop reason: SDUPTHER

## 2024-03-12 RX ORDER — AMLODIPINE BESYLATE 5 MG/1
5 TABLET ORAL DAILY
Qty: 90 TABLET | Refills: 1 | Status: SHIPPED | OUTPATIENT
Start: 2024-03-12

## 2024-03-12 RX ORDER — ESCITALOPRAM OXALATE 20 MG/1
20 TABLET ORAL DAILY
Qty: 90 TABLET | Refills: 1 | Status: SHIPPED | OUTPATIENT
Start: 2024-03-12 | End: 2025-03-12

## 2024-03-12 RX ORDER — TIZANIDINE 4 MG/1
4 TABLET ORAL EVERY 6 HOURS PRN
COMMUNITY
End: 2024-03-16 | Stop reason: SDUPTHER

## 2024-03-12 RX ORDER — METOPROLOL TARTRATE 50 MG/1
50 TABLET ORAL DAILY
Qty: 90 TABLET | Refills: 1 | Status: SHIPPED | OUTPATIENT
Start: 2024-03-12

## 2024-03-15 ENCOUNTER — PATIENT MESSAGE (OUTPATIENT)
Dept: FAMILY MEDICINE | Facility: CLINIC | Age: 69
End: 2024-03-15
Payer: MEDICARE

## 2024-03-16 RX ORDER — TIZANIDINE 4 MG/1
4 TABLET ORAL EVERY 8 HOURS
Qty: 30 TABLET | Refills: 0 | Status: SHIPPED | OUTPATIENT
Start: 2024-03-16

## 2024-03-16 RX ORDER — PROMETHAZINE HYDROCHLORIDE 25 MG/1
25 TABLET ORAL EVERY 6 HOURS PRN
Qty: 20 TABLET | Refills: 0 | Status: SHIPPED | OUTPATIENT
Start: 2024-03-16

## 2024-03-16 NOTE — PROGRESS NOTES
SUBJECTIVE:    Patient ID: Eulalia Bermudez is a 68 y.o. female.    Chief Complaint: Follow-up (Bottles brought//Pt here for 6 mo follow up//discuss iron levels//pt would like order for mammo//Pt states when she had last colo there dr told her 10 years. States she is at 8 years currently//JL)    68 year old female presents for check up.treated for migraines, depression, anxiety, hypothyroid. taking both wellbutrin and lexapro.feels like stress/anxiety is well controlled.  Followed by dr. Spencer . Has labs done with her. Bp in office is well controlled. Stress is manageable. Sleeping ok. Feels tired. Has been anemic in the past. Would like to check. . Last mammogram 4/23. Last dexa 4/22 . Last colonoscopy 2017 . 10 year follow up. Would like refill on promethazine and zanaflex. Takes prn    Follow-up  Associated symptoms include headaches and neck pain. Pertinent negatives include no arthralgias, chest pain, joint swelling, vomiting or weakness.       Lab Visit on 02/09/2024   Component Date Value Ref Range Status    Free T4 02/09/2024 0.79  0.71 - 1.51 ng/dL Final    TSH 02/09/2024 2.033  0.340 - 5.600 uIU/mL Final    Vit D, 25-Hydroxy 02/09/2024 30  30 - 96 ng/mL Final   Telephone on 11/21/2023   Component Date Value Ref Range Status    Ferritin 11/28/2023 30  16 - 288 ng/mL Final    WBC 11/28/2023 6.5  3.8 - 10.8 Thousand/uL Final    RBC 11/28/2023 4.58  3.80 - 5.10 Million/uL Final    Hemoglobin 11/28/2023 14.4  11.7 - 15.5 g/dL Final    Hematocrit 11/28/2023 43.3  35.0 - 45.0 % Final    MCV 11/28/2023 94.5  80.0 - 100.0 fL Final    MCH 11/28/2023 31.4  27.0 - 33.0 pg Final    MCHC 11/28/2023 33.3  32.0 - 36.0 g/dL Final    RDW 11/28/2023 15.9 (H)  11.0 - 15.0 % Final    Platelets 11/28/2023 291  140 - 400 Thousand/uL Final    MPV 11/28/2023 10.4  7.5 - 12.5 fL Final    Neutrophils, Abs 11/28/2023 3,562  1,500 - 7,800 cells/uL Final    Lymph # 11/28/2023 2,041  850 - 3,900 cells/uL Final    Mono # 11/28/2023  618  200 - 950 cells/uL Final    Eos # 11/28/2023 221  15 - 500 cells/uL Final    Baso # 11/28/2023 59  0 - 200 cells/uL Final    Neutrophils Relative 11/28/2023 54.8  % Final    Lymph % 11/28/2023 31.4  % Final    Mono % 11/28/2023 9.5  % Final    Eosinophil % 11/28/2023 3.4  % Final    Basophil % 11/28/2023 0.9  % Final   Telephone on 10/17/2023   Component Date Value Ref Range Status    Fecal Globin, Insure 11/07/2023    Final    WBC 10/23/2023 5.1  3.8 - 10.8 Thousand/uL Final    RBC 10/23/2023 4.47  3.80 - 5.10 Million/uL Final    Hemoglobin 10/23/2023 13.1  11.7 - 15.5 g/dL Final    Hematocrit 10/23/2023 41.9  35.0 - 45.0 % Final    MCV 10/23/2023 93.7  80.0 - 100.0 fL Final    MCH 10/23/2023 29.3  27.0 - 33.0 pg Final    MCHC 10/23/2023 31.3 (L)  32.0 - 36.0 g/dL Final    RDW 10/23/2023 18.3 (H)  11.0 - 15.0 % Final    Platelets 10/23/2023 254  140 - 400 Thousand/uL Final    MPV 10/23/2023 10.8  7.5 - 12.5 fL Final    Neutrophils, Abs 10/23/2023 2,876  1,500 - 7,800 cells/uL Final    Lymph # 10/23/2023 1,525  850 - 3,900 cells/uL Final    Mono # 10/23/2023 454  200 - 950 cells/uL Final    Eos # 10/23/2023 184  15 - 500 cells/uL Final    Baso # 10/23/2023 61  0 - 200 cells/uL Final    Neutrophils Relative 10/23/2023 56.4  % Final    Lymph % 10/23/2023 29.9  % Final    Mono % 10/23/2023 8.9  % Final    Eosinophil % 10/23/2023 3.6  % Final    Basophil % 10/23/2023 1.2  % Final    Ferritin 10/23/2023 23  16 - 288 ng/mL Final       Past Medical History:   Diagnosis Date    Arthritis     Back pain     Depression     Hypertension     Hypothyroidism     Kidney stones     MVP (mitral valve prolapse)     hx - palpitations     Social History     Socioeconomic History    Marital status:    Tobacco Use    Smoking status: Never    Smokeless tobacco: Never   Substance and Sexual Activity    Alcohol use: Yes     Alcohol/week: 2.0 standard drinks of alcohol     Types: 2 Glasses of wine per week     Comment: nightly     Drug use: No    Sexual activity: Yes     Partners: Male     Social Determinants of Health     Financial Resource Strain: Low Risk  (3/6/2024)    Overall Financial Resource Strain (CARDIA)     Difficulty of Paying Living Expenses: Not hard at all   Food Insecurity: No Food Insecurity (3/6/2024)    Hunger Vital Sign     Worried About Running Out of Food in the Last Year: Never true     Ran Out of Food in the Last Year: Never true   Transportation Needs: No Transportation Needs (3/6/2024)    PRAPARE - Transportation     Lack of Transportation (Medical): No     Lack of Transportation (Non-Medical): No   Physical Activity: Insufficiently Active (3/6/2024)    Exercise Vital Sign     Days of Exercise per Week: 3 days     Minutes of Exercise per Session: 30 min   Stress: Stress Concern Present (3/6/2024)    [employer] - Occupational Stress Questionnaire     Feeling of Stress : To some extent   Social Connections: Unknown (3/6/2024)    Social Connection and Isolation Panel [NHANES]     Frequency of Communication with Friends and Family: More than three times a week     Frequency of Social Gatherings with Friends and Family: Once a week     Active Member of Clubs or Organizations: Yes     Attends Club or Organization Meetings: More than 4 times per year     Marital Status:    Housing Stability: Low Risk  (3/6/2024)    Housing Stability Vital Sign     Unable to Pay for Housing in the Last Year: No     Number of Places Lived in the Last Year: 1     Unstable Housing in the Last Year: No     Past Surgical History:   Procedure Laterality Date     SECTION      DRAINAGE Right 2023    Procedure: Drainage;  Surgeon: Jesus Reeves MD;  Location: [facility] CATH LAB;  Service: Radiology;  Laterality: Right;    NEPHROSTOGRAPHY Left 2023    Procedure: Nephrostogram;  Surgeon: Otoniel Hawk II, MD;  Location: [facility] CATH LAB;  Service: Interventional Radiology;  Laterality: Left;     PERCUTANEOUS NEPHROLITHOTOMY Right 6/20/2023    Procedure: NEPHROLITHOTOMY, PERCUTANEOUS;  Surgeon: Bert Anaya MD;  Location: [facility] OR;  Service: Urology;  Laterality: Right;  Pt will be inpatient. IR will place nephrostomy day prior to surgery.2 HOURS    PERCUTANEOUS NEPHROSTOMY Right 6/19/2023    Procedure: Creation, Nephrostomy, Percutaneous;  Surgeon: Jesus Reeves MD;  Location: [facility] CATH LAB;  Service: Radiology;  Laterality: Right;     Family History   Problem Relation Age of Onset    Cancer Mother     COPD Mother     Heart disease Mother     Hypertension Mother     Depression Mother     Hearing loss Mother     Emphysema Father     Breast cancer Paternal Aunt        Tests to Keep You Healthy    Mammogram: Met on 4/17/2023  Colon Cancer Screening: DUE  Last Blood Pressure <= 139/89 (3/12/2024): Yes      Review of patient's allergies indicates:   Allergen Reactions    Imitrex [sumatriptan] Anaphylaxis    Methylprednisolone Other (See Comments)     Flush and hot sweats    Egg Nausea Only    Penicillins Rash    Sulfa (sulfonamide antibiotics) Rash       Current Outpatient Medications:     acetaminophen (TYLENOL) 500 MG tablet, Take 500 mg by mouth every 6 (six) hours as needed for Pain., Disp: , Rfl:     B COMPLEX-VITAMIN B12 tablet, 1 tablet  once a day, Disp: , Rfl:     cetirizine (ZYRTEC) 10 MG tablet, Take 10 mg by mouth once daily., Disp: , Rfl:     codeine-butalbital-ASA-caffeine (BUTALBITAL COMPOUND W/CODEINE) 84--40 mg Cap, Take 1 capsule by mouth every 6 (six) hours as needed., Disp: 30 capsule, Rfl: 0    diclofenac sodium (VOLTAREN) 1 % Gel, Apply topically 2 (two) times daily as needed., Disp: 100 g, Rfl: 0    ketoconazole (NIZORAL) 2 % cream, Apply topically once daily., Disp: 60 g, Rfl: 0    PREVIDENT 5000 PLUS 1.1 % Crea, Take by mouth every evening., Disp: , Rfl:     triamcinolone (NASACORT) 55 mcg nasal inhaler, 2 sprays by Nasal route once daily., Disp: 1 g, Rfl: 6    UNITHROID  112 mcg tablet, Take 112 mcg by mouth once daily., Disp: , Rfl:     vitamin D (VITAMIN D3) 1000 units Tab, Take 1,000 Units by mouth once daily., Disp: , Rfl:     amLODIPine (NORVASC) 5 MG tablet, Take 1 tablet (5 mg total) by mouth once daily., Disp: 90 tablet, Rfl: 1    ascorbic acid, vitamin C, (VITAMIN C) 500 MG tablet, Take 500 mg by mouth 2 (two) times a day., Disp: , Rfl:     buPROPion (WELLBUTRIN XL) 300 MG 24 hr tablet, Take 1 tablet (300 mg total) by mouth once daily., Disp: 90 tablet, Rfl: 1    EScitalopram oxalate (LEXAPRO) 20 MG tablet, Take 1 tablet (20 mg total) by mouth once daily., Disp: 90 tablet, Rfl: 1    ferrous sulfate (SLOW FE ORAL), Take by mouth., Disp: , Rfl:     folic acid (FOLVITE) 400 MCG tablet, Take 400 mcg by mouth once daily., Disp: , Rfl:     gabapentin (NEURONTIN) 300 MG capsule, Take 1 capsule (300 mg total) by mouth 2 (two) times daily. (Patient not taking: Reported on 3/12/2024), Disp: 180 capsule, Rfl: 1    metoprolol tartrate (LOPRESSOR) 50 MG tablet, Take 1 tablet (50 mg total) by mouth once daily., Disp: 90 tablet, Rfl: 1    potassium citrate (UROCIT-K 15) 15 mEq TbSR, Take 30 mEq by mouth 2 (two) times a day. (Patient not taking: Reported on 3/12/2024), Disp: 120 tablet, Rfl: 11    promethazine (PHENERGAN) 25 MG tablet, Take 1 tablet (25 mg total) by mouth every 6 (six) hours as needed for Nausea., Disp: 20 tablet, Rfl: 0    tiZANidine (ZANAFLEX) 4 MG tablet, Take 1 tablet (4 mg total) by mouth every 8 (eight) hours., Disp: 30 tablet, Rfl: 0    UNITHROID 100 mcg tablet, Take 100 mcg by mouth once daily., Disp: , Rfl:     Review of Systems   Constitutional:  Negative for activity change and unexpected weight change.   HENT:  Negative for hearing loss, rhinorrhea and trouble swallowing.    Eyes:  Negative for discharge and visual disturbance.   Respiratory:  Negative for chest tightness and wheezing.    Cardiovascular:  Negative for chest pain and palpitations.  "  Gastrointestinal:  Positive for diarrhea. Negative for blood in stool, constipation and vomiting.   Endocrine: Negative for polydipsia and polyuria.   Genitourinary:  Negative for difficulty urinating, dysuria, hematuria and menstrual problem.   Musculoskeletal:  Positive for neck pain. Negative for arthralgias and joint swelling.   Neurological:  Positive for headaches. Negative for weakness.   Psychiatric/Behavioral:  Negative for confusion and dysphoric mood.           Objective:      Vitals:    03/12/24 1335   BP: 130/70   Pulse: 68   SpO2: 95%   Weight: 69.9 kg (154 lb)   Height: 5' 5" (1.651 m)     Physical Exam  Vitals and nursing note reviewed.   Constitutional:       General: She is not in acute distress.     Appearance: Normal appearance. She is well-developed.   HENT:      Head: Normocephalic.      Right Ear: External ear normal.      Left Ear: External ear normal.   Eyes:      Conjunctiva/sclera: Conjunctivae normal.      Pupils: Pupils are equal, round, and reactive to light.   Neck:      Vascular: No JVD.   Cardiovascular:      Rate and Rhythm: Normal rate and regular rhythm.      Heart sounds: No murmur heard.  Pulmonary:      Effort: Pulmonary effort is normal.      Breath sounds: Normal breath sounds.   Abdominal:      General: Bowel sounds are normal.      Palpations: Abdomen is soft.   Musculoskeletal:         General: No deformity. Normal range of motion.      Cervical back: Normal range of motion and neck supple.   Lymphadenopathy:      Cervical: No cervical adenopathy.   Skin:     General: Skin is warm and dry.      Findings: No rash.   Neurological:      Mental Status: She is alert and oriented to person, place, and time.      Gait: Gait normal.   Psychiatric:         Speech: Speech normal.         Behavior: Behavior normal.           Assessment:       1. Anemia, unspecified type    2. Hypertension, essential    3. High risk medication use    4. Physical exam    5. Moderate major depression  "   6. Seasonal allergic rhinitis due to pollen    7. Encounter for screening for malignant neoplasm of breast, unspecified screening modality    8. Encounter for screening mammogram for malignant neoplasm of breast    9. Encounter for osteoporosis screening in asymptomatic postmenopausal patient    10. Menopause    11. Migraine without aura and without status migrainosus, not intractable    12. Hypothyroidism, unspecified type    13. Muscle spasm         Plan:       Anemia, unspecified type  Comments:  labs  Orders:  -     Ferritin; Future; Expected date: 03/12/2024  -     Iron and TIBC; Future; Expected date: 03/12/2024    Hypertension, essential  Comments:  bp controlled  Orders:  -     amLODIPine (NORVASC) 5 MG tablet; Take 1 tablet (5 mg total) by mouth once daily.  Dispense: 90 tablet; Refill: 1  -     metoprolol tartrate (LOPRESSOR) 50 MG tablet; Take 1 tablet (50 mg total) by mouth once daily.  Dispense: 90 tablet; Refill: 1  -     Lipid Panel; Future; Expected date: 03/12/2024  -     CBC Auto Differential; Future; Expected date: 03/12/2024  -     Comprehensive Metabolic Panel; Future; Expected date: 03/12/2024    High risk medication use  -     amLODIPine (NORVASC) 5 MG tablet; Take 1 tablet (5 mg total) by mouth once daily.  Dispense: 90 tablet; Refill: 1  -     buPROPion (WELLBUTRIN XL) 300 MG 24 hr tablet; Take 1 tablet (300 mg total) by mouth once daily.  Dispense: 90 tablet; Refill: 1  -     metoprolol tartrate (LOPRESSOR) 50 MG tablet; Take 1 tablet (50 mg total) by mouth once daily.  Dispense: 90 tablet; Refill: 1  -     EScitalopram oxalate (LEXAPRO) 20 MG tablet; Take 1 tablet (20 mg total) by mouth once daily.  Dispense: 90 tablet; Refill: 1    Physical exam  -     amLODIPine (NORVASC) 5 MG tablet; Take 1 tablet (5 mg total) by mouth once daily.  Dispense: 90 tablet; Refill: 1  -     buPROPion (WELLBUTRIN XL) 300 MG 24 hr tablet; Take 1 tablet (300 mg total) by mouth once daily.  Dispense: 90  tablet; Refill: 1  -     metoprolol tartrate (LOPRESSOR) 50 MG tablet; Take 1 tablet (50 mg total) by mouth once daily.  Dispense: 90 tablet; Refill: 1  -     EScitalopram oxalate (LEXAPRO) 20 MG tablet; Take 1 tablet (20 mg total) by mouth once daily.  Dispense: 90 tablet; Refill: 1    Moderate major depression  Comments:  increase lexapro to 20  Orders:  -     buPROPion (WELLBUTRIN XL) 300 MG 24 hr tablet; Take 1 tablet (300 mg total) by mouth once daily.  Dispense: 90 tablet; Refill: 1  -     EScitalopram oxalate (LEXAPRO) 20 MG tablet; Take 1 tablet (20 mg total) by mouth once daily.  Dispense: 90 tablet; Refill: 1    Seasonal allergic rhinitis due to pollen  -     Lipid Panel; Future; Expected date: 03/12/2024  -     CBC Auto Differential; Future; Expected date: 03/12/2024  -     Comprehensive Metabolic Panel; Future; Expected date: 03/12/2024    Encounter for screening for malignant neoplasm of breast, unspecified screening modality  -     Mammo Digital Screening Bilat; Future; Expected date: 04/18/2024    Encounter for screening mammogram for malignant neoplasm of breast  -     Mammo Digital Screening Bilat; Future; Expected date: 04/18/2024    Encounter for osteoporosis screening in asymptomatic postmenopausal patient  Comments:  dexa  Orders:  -     DXA Bone Density Axial Skeleton 1 or more sites; Future; Expected date: 03/16/2024    Menopause  -     DXA Bone Density Axial Skeleton 1 or more sites; Future; Expected date: 03/16/2024    Migraine without aura and without status migrainosus, not intractable  Comments:  controlled    Hypothyroidism, unspecified type  Comments:  followed by endocrine    Muscle spasm  Comments:  zanaflex    Other orders  -     promethazine (PHENERGAN) 25 MG tablet; Take 1 tablet (25 mg total) by mouth every 6 (six) hours as needed for Nausea.  Dispense: 20 tablet; Refill: 0  -     tiZANidine (ZANAFLEX) 4 MG tablet; Take 1 tablet (4 mg total) by mouth every 8 (eight) hours.   Dispense: 30 tablet; Refill: 0      Follow up in about 6 months (around 9/12/2024), or if symptoms worsen or fail to improve, for medication management.        3/16/2024 Lizy Noble

## 2024-04-01 RX ORDER — BUTALBITAL, ASPIRIN, CAFFEINE AND CODEINE PHOSPHATE 50; 325; 40; 30 MG/1; MG/1; MG/1; MG/1
1 CAPSULE ORAL EVERY 6 HOURS PRN
Qty: 30 CAPSULE | Refills: 0 | Status: SHIPPED | OUTPATIENT
Start: 2024-04-01 | End: 2024-04-29 | Stop reason: SDUPTHER

## 2024-04-01 NOTE — TELEPHONE ENCOUNTER
----- Message from Monik Jaramillo sent at 4/1/2024 12:35 PM CDT -----  - pt needs refill on [facility]   [phone]

## 2024-04-11 ENCOUNTER — TELEPHONE (OUTPATIENT)
Dept: FAMILY MEDICINE | Facility: CLINIC | Age: 69
End: 2024-04-11
Payer: MEDICARE

## 2024-04-19 ENCOUNTER — LAB VISIT (OUTPATIENT)
Dept: LAB | Facility: HOSPITAL | Age: 69
End: 2024-04-19
Attending: NURSE PRACTITIONER
Payer: MEDICARE

## 2024-04-19 DIAGNOSIS — J30.1 SEASONAL ALLERGIC RHINITIS DUE TO POLLEN: ICD-10-CM

## 2024-04-19 DIAGNOSIS — D64.9 ANEMIA, UNSPECIFIED TYPE: ICD-10-CM

## 2024-04-19 DIAGNOSIS — I10 HYPERTENSION, ESSENTIAL: ICD-10-CM

## 2024-04-19 LAB
ALBUMIN SERPL BCP-MCNC: 3.6 G/DL (ref 3.5–5.2)
ALP SERPL-CCNC: 84 U/L (ref 55–135)
ALT SERPL W/O P-5'-P-CCNC: 16 U/L (ref 10–44)
ANION GAP SERPL CALC-SCNC: 7 MMOL/L (ref 8–16)
AST SERPL-CCNC: 20 U/L (ref 10–40)
BASOPHILS # BLD AUTO: 0.06 K/UL (ref 0–0.2)
BASOPHILS NFR BLD: 1.1 % (ref 0–1.9)
BILIRUB SERPL-MCNC: 0.3 MG/DL (ref 0.1–1)
BUN SERPL-MCNC: 18 MG/DL (ref 8–23)
CALCIUM SERPL-MCNC: 8.7 MG/DL (ref 8.7–10.5)
CHLORIDE SERPL-SCNC: 103 MMOL/L (ref 95–110)
CHOLEST SERPL-MCNC: 234 MG/DL (ref 120–199)
CHOLEST/HDLC SERPL: 3.5 {RATIO} (ref 2–5)
CO2 SERPL-SCNC: 30 MMOL/L (ref 23–29)
CREAT SERPL-MCNC: 0.9 MG/DL (ref 0.5–1.4)
DIFFERENTIAL METHOD BLD: ABNORMAL
EOSINOPHIL # BLD AUTO: 0.2 K/UL (ref 0–0.5)
EOSINOPHIL NFR BLD: 4.3 % (ref 0–8)
ERYTHROCYTE [DISTWIDTH] IN BLOOD BY AUTOMATED COUNT: 12 % (ref 11.5–14.5)
EST. GFR  (NO RACE VARIABLE): >60 ML/MIN/1.73 M^2
FERRITIN SERPL-MCNC: 44.3 NG/ML (ref 20–300)
GLUCOSE SERPL-MCNC: 77 MG/DL (ref 70–110)
HCT VFR BLD AUTO: 41.3 % (ref 37–48.5)
HDLC SERPL-MCNC: 66 MG/DL (ref 40–75)
HDLC SERPL: 28.2 % (ref 20–50)
HGB BLD-MCNC: 13.3 G/DL (ref 12–16)
IMM GRANULOCYTES # BLD AUTO: 0.02 K/UL (ref 0–0.04)
IMM GRANULOCYTES NFR BLD AUTO: 0.4 % (ref 0–0.5)
IRON SERPL-MCNC: 85 UG/DL (ref 30–160)
LDLC SERPL CALC-MCNC: 152.6 MG/DL (ref 63–159)
LYMPHOCYTES # BLD AUTO: 1.4 K/UL (ref 1–4.8)
LYMPHOCYTES NFR BLD: 25.1 % (ref 18–48)
MCH RBC QN AUTO: 33.6 PG (ref 27–31)
MCHC RBC AUTO-ENTMCNC: 32.2 G/DL (ref 32–36)
MCV RBC AUTO: 104 FL (ref 82–98)
MONOCYTES # BLD AUTO: 0.7 K/UL (ref 0.3–1)
MONOCYTES NFR BLD: 11.8 % (ref 4–15)
NEUTROPHILS # BLD AUTO: 3.2 K/UL (ref 1.8–7.7)
NEUTROPHILS NFR BLD: 57.3 % (ref 38–73)
NONHDLC SERPL-MCNC: 168 MG/DL
NRBC BLD-RTO: 0 /100 WBC
PLATELET # BLD AUTO: 256 K/UL (ref 150–450)
PMV BLD AUTO: 9.8 FL (ref 9.2–12.9)
POTASSIUM SERPL-SCNC: 4.3 MMOL/L (ref 3.5–5.1)
PROT SERPL-MCNC: 6.2 G/DL (ref 6–8.4)
RBC # BLD AUTO: 3.96 M/UL (ref 4–5.4)
SATURATED IRON: 27 % (ref 20–50)
SODIUM SERPL-SCNC: 140 MMOL/L (ref 136–145)
TOTAL IRON BINDING CAPACITY: 314 UG/DL (ref 250–450)
TRANSFERRIN SERPL-MCNC: 224 MG/DL (ref 200–375)
TRIGL SERPL-MCNC: 77 MG/DL (ref 30–150)
WBC # BLD AUTO: 5.53 K/UL (ref 3.9–12.7)

## 2024-04-19 PROCEDURE — 80061 LIPID PANEL: CPT | Performed by: NURSE PRACTITIONER

## 2024-04-19 PROCEDURE — 36415 COLL VENOUS BLD VENIPUNCTURE: CPT | Performed by: NURSE PRACTITIONER

## 2024-04-19 PROCEDURE — 85025 COMPLETE CBC W/AUTO DIFF WBC: CPT | Performed by: NURSE PRACTITIONER

## 2024-04-19 PROCEDURE — 80053 COMPREHEN METABOLIC PANEL: CPT | Performed by: NURSE PRACTITIONER

## 2024-04-19 PROCEDURE — 82728 ASSAY OF FERRITIN: CPT | Performed by: NURSE PRACTITIONER

## 2024-04-19 PROCEDURE — 83540 ASSAY OF IRON: CPT | Performed by: NURSE PRACTITIONER

## 2024-04-24 ENCOUNTER — PATIENT MESSAGE (OUTPATIENT)
Dept: FAMILY MEDICINE | Facility: CLINIC | Age: 69
End: 2024-04-24
Payer: MEDICARE

## 2024-04-25 ENCOUNTER — HOSPITAL ENCOUNTER (OUTPATIENT)
Dept: RADIOLOGY | Facility: HOSPITAL | Age: 69
Discharge: HOME | End: 2024-04-25
Attending: NURSE PRACTITIONER
Payer: MEDICARE

## 2024-04-25 VITALS — HEIGHT: 65 IN | BODY MASS INDEX: 25.66 KG/M2 | WEIGHT: 154 LBS

## 2024-04-25 DIAGNOSIS — Z12.39 ENCOUNTER FOR SCREENING FOR MALIGNANT NEOPLASM OF BREAST, UNSPECIFIED SCREENING MODALITY: ICD-10-CM

## 2024-04-25 DIAGNOSIS — Z12.31 ENCOUNTER FOR SCREENING MAMMOGRAM FOR MALIGNANT NEOPLASM OF BREAST: ICD-10-CM

## 2024-04-25 PROCEDURE — 77067 SCR MAMMO BI INCL CAD: CPT | Mod: 26,,, | Performed by: RADIOLOGY

## 2024-04-25 PROCEDURE — 77063 BREAST TOMOSYNTHESIS BI: CPT | Mod: 26,,, | Performed by: RADIOLOGY

## 2024-04-25 PROCEDURE — 77063 BREAST TOMOSYNTHESIS BI: CPT | Mod: TC,PO

## 2024-04-25 RX ORDER — ROSUVASTATIN CALCIUM 20 MG/1
20 TABLET, COATED ORAL DAILY
Qty: 90 TABLET | Refills: 3 | Status: SHIPPED | OUTPATIENT
Start: 2024-04-25 | End: 2025-04-25

## 2024-04-25 NOTE — TELEPHONE ENCOUNTER
Spoke with pt in regards to message sent. Verbalized verbatim per Lizy. Pt acknowledged understanding. Pt is willing to take the Crestor.

## 2024-04-29 ENCOUNTER — TELEPHONE (OUTPATIENT)
Dept: FAMILY MEDICINE | Facility: CLINIC | Age: 69
End: 2024-04-29
Payer: MEDICARE

## 2024-04-29 RX ORDER — BUTALBITAL, ASPIRIN, CAFFEINE AND CODEINE PHOSPHATE 50; 325; 40; 30 MG/1; MG/1; MG/1; MG/1
1 CAPSULE ORAL EVERY 6 HOURS PRN
Qty: 30 CAPSULE | Refills: 0 | Status: SHIPPED | OUTPATIENT
Start: 2024-05-01 | End: 2024-05-28 | Stop reason: SDUPTHER

## 2024-04-29 NOTE — TELEPHONE ENCOUNTER
----- Message from Monik Jaramillo sent at 4/29/2024 11:34 AM CDT -----  - 10:56-pt needs refill on butalbital  [facility]    [phone]

## 2024-05-10 ENCOUNTER — PATIENT MESSAGE (OUTPATIENT)
Dept: UROLOGY | Facility: CLINIC | Age: 69
End: 2024-05-10
Payer: MEDICARE

## 2024-05-23 ENCOUNTER — PATIENT OUTREACH (OUTPATIENT)
Dept: ADMINISTRATIVE | Facility: HOSPITAL | Age: 69
End: 2024-05-23
Payer: MEDICARE

## 2024-05-23 NOTE — PROGRESS NOTES
Population Health Chart Review & Patient Outreach Details      Additional [facility] Health Notes:               Updates Requested / Reviewed:      Updated Care Coordination Note, External Sources: [facility], and Immunizations Reconciliation Completed or Queried: [facility] Topics Overdue:      VBHM Score: 0     Patient is not due for any topics at this time.    Shingles/Zoster Vaccine  RSV Vaccine                  Health Maintenance Topic(s) Outreach Outcomes & Actions Taken:    Colorectal Cancer Screening - Outreach Outcomes & Actions Taken  : External Records Uploaded, Care Team Updated, & History Updated if Applicable

## 2024-05-28 RX ORDER — BUTALBITAL, ASPIRIN, CAFFEINE AND CODEINE PHOSPHATE 50; 325; 40; 30 MG/1; MG/1; MG/1; MG/1
1 CAPSULE ORAL EVERY 6 HOURS PRN
Qty: 30 CAPSULE | Refills: 0 | Status: SHIPPED | OUTPATIENT
Start: 2024-05-31

## 2024-05-28 NOTE — TELEPHONE ENCOUNTER
----- Message from Monik Jaramillo sent at 5/28/2024 11:14 AM CDT -----  -11:07- pt needs refill on butalbital   [phone]

## 2024-06-24 NOTE — TELEPHONE ENCOUNTER
----- Message from Monik Jaramillo sent at 6/24/2024 11:19 AM CDT -----  Vm- 10:57-pt needs refill on butalbital   [phone]

## 2024-06-25 RX ORDER — BUTALBITAL, ASPIRIN, CAFFEINE AND CODEINE PHOSPHATE 50; 325; 40; 30 MG/1; MG/1; MG/1; MG/1
1 CAPSULE ORAL EVERY 6 HOURS PRN
Qty: 30 CAPSULE | Refills: 0 | Status: CANCELLED | OUTPATIENT
Start: 2024-06-25

## 2024-06-25 RX ORDER — BUTALBITAL, ASPIRIN, CAFFEINE AND CODEINE PHOSPHATE 50; 325; 40; 30 MG/1; MG/1; MG/1; MG/1
1 CAPSULE ORAL EVERY 6 HOURS PRN
Qty: 30 CAPSULE | Refills: 0 | Status: SHIPPED | OUTPATIENT
Start: 2024-06-28

## 2024-07-29 RX ORDER — BUTALBITAL, ASPIRIN, CAFFEINE AND CODEINE PHOSPHATE 50; 325; 40; 30 MG/1; MG/1; MG/1; MG/1
1 CAPSULE ORAL EVERY 6 HOURS PRN
Qty: 30 CAPSULE | Refills: 0 | Status: SHIPPED | OUTPATIENT
Start: 2024-07-29

## 2024-07-29 NOTE — TELEPHONE ENCOUNTER
----- Message from Monik Jaramillo sent at 7/29/2024 11:58 AM CDT -----  - 11:07-pt needs refill on butalbital   [facility]  [phone]

## 2024-08-01 ENCOUNTER — HOSPITAL ENCOUNTER (OUTPATIENT)
Dept: RADIOLOGY | Facility: HOSPITAL | Age: 69
Discharge: HOME | End: 2024-08-01
Attending: NURSE PRACTITIONER
Payer: MEDICARE

## 2024-08-01 ENCOUNTER — OFFICE VISIT (OUTPATIENT)
Dept: FAMILY MEDICINE | Facility: CLINIC | Age: 69
End: 2024-08-01
Payer: MEDICARE

## 2024-08-01 ENCOUNTER — PATIENT MESSAGE (OUTPATIENT)
Dept: FAMILY MEDICINE | Facility: CLINIC | Age: 69
End: 2024-08-01

## 2024-08-01 ENCOUNTER — TELEPHONE (OUTPATIENT)
Dept: FAMILY MEDICINE | Facility: CLINIC | Age: 69
End: 2024-08-01
Payer: MEDICARE

## 2024-08-01 VITALS
BODY MASS INDEX: 24.91 KG/M2 | HEIGHT: 66 IN | OXYGEN SATURATION: 97 % | HEART RATE: 64 BPM | SYSTOLIC BLOOD PRESSURE: 132 MMHG | WEIGHT: 155 LBS | DIASTOLIC BLOOD PRESSURE: 74 MMHG

## 2024-08-01 DIAGNOSIS — Z78.0 ENCOUNTER FOR OSTEOPOROSIS SCREENING IN ASYMPTOMATIC POSTMENOPAUSAL PATIENT: ICD-10-CM

## 2024-08-01 DIAGNOSIS — I10 HYPERTENSION, ESSENTIAL: Primary | ICD-10-CM

## 2024-08-01 DIAGNOSIS — Z78.0 MENOPAUSE: ICD-10-CM

## 2024-08-01 DIAGNOSIS — Z13.820 ENCOUNTER FOR OSTEOPOROSIS SCREENING IN ASYMPTOMATIC POSTMENOPAUSAL PATIENT: ICD-10-CM
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
## 2024-08-01 PROCEDURE — 3288F FALL RISK ASSESSMENT DOCD: CPT | Mod: CPTII,,, | Performed by: NURSE PRACTITIONER

## 2024-08-01 PROCEDURE — 1159F MED LIST DOCD IN RCRD: CPT | Mod: CPTII,,, | Performed by: NURSE PRACTITIONER

## 2024-08-01 PROCEDURE — 3078F DIAST BP <80 MM HG: CPT | Mod: CPTII,,, | Performed by: NURSE PRACTITIONER

## 2024-08-01 PROCEDURE — 1125F AMNT PAIN NOTED PAIN PRSNT: CPT | Mod: CPTII,,, | Performed by: NURSE PRACTITIONER

## 2024-08-01 PROCEDURE — 3008F BODY MASS INDEX DOCD: CPT | Mod: CPTII,,, | Performed by: NURSE PRACTITIONER

## 2024-08-01 PROCEDURE — 1160F RVW MEDS BY RX/DR IN RCRD: CPT | Mod: CPTII,,, | Performed by: NURSE PRACTITIONER

## 2024-08-01 PROCEDURE — 77080 DXA BONE DENSITY AXIAL: CPT | Mod: TC,PO

## 2024-08-01 PROCEDURE — 99213 OFFICE O/P EST LOW 20 MIN: CPT | Mod: ,,, | Performed by: NURSE PRACTITIONER

## 2024-08-01 PROCEDURE — 77080 DXA BONE DENSITY AXIAL: CPT | Mod: 26,,, | Performed by: RADIOLOGY

## 2024-08-01 PROCEDURE — 1101F PT FALLS ASSESS-DOCD LE1/YR: CPT | Mod: CPTII,,, | Performed by: NURSE PRACTITIONER

## 2024-08-01 PROCEDURE — 3075F SYST BP GE 130 - 139MM HG: CPT | Mod: CPTII,,, | Performed by: NURSE PRACTITIONER

## 2024-08-01 RX ORDER — METRONIDAZOLE 500 MG/1
500 TABLET ORAL 3 TIMES DAILY
Qty: 21 TABLET | Refills: 0 | Status: SHIPPED | OUTPATIENT
Start: 2024-08-01

## 2024-08-01 RX ORDER — DOXYCYCLINE 100 MG/1
100 CAPSULE ORAL 2 TIMES DAILY
Qty: 14 CAPSULE | Refills: 0 | Status: SHIPPED | OUTPATIENT
Start: 2024-08-01

## 2024-08-01 NOTE — TELEPHONE ENCOUNTER
----- Message from Monik Jaramillo sent at 8/1/2024  9:27 AM CDT -----  Pt cat bit her last night and now her hand is swollen this morning. Would like to know if she can be seen   [phone]

## 2024-08-01 NOTE — TELEPHONE ENCOUNTER
----- Message from Monik Jaramillo sent at 8/1/2024  9:27 AM CDT -----  Pt cat bit her last night and now her hand is swollen this morning. Would like to know if she can be seen   [phone]

## 2024-08-12 ENCOUNTER — TELEPHONE (OUTPATIENT)
Dept: FAMILY MEDICINE | Facility: CLINIC | Age: 69
End: 2024-08-12
Payer: MEDICARE

## 2024-08-12 NOTE — PROGRESS NOTES
SUBJECTIVE:    Patient ID: Eulalia Bermudez is a 69 y.o. female.    Chief Complaint: Animal Bite (Cat bite and scratch happened yesterday//-ERL)    69 year old female presents for urgent visit. treated for migraines, depression, anxiety, hypothyroid. Taking meds as prescribed. Bp in office is well controlled. Today is reporting cat bite and scratch since yesterday. This is patients cat. Utd on vaccine. No fever.     Animal Bite   Associated symptoms include headaches and neck pain. Pertinent negatives include no chest pain, no vomiting and no weakness.   Follow-up  Associated symptoms include headaches and neck pain. Pertinent negatives include no arthralgias, chest pain, chills, fatigue, fever, joint swelling, vomiting or weakness.       Patient Outreach on 05/23/2024   Component Date Value Ref Range Status    Fecal Immunochemical Test (iFOBT) 11/07/2023 Not Detected   Final   Lab Visit on 04/19/2024   Component Date Value Ref Range Status    Ferritin 04/19/2024 44.3  20.0 - 300.0 ng/mL Final    Iron 04/19/2024 85  30 - 160 ug/dL Final    Transferrin 04/19/2024 224  200 - 375 mg/dL Final    TIBC 04/19/2024 314  250 - 450 ug/dL Final    Saturated Iron 04/19/2024 27  20 - 50 % Final    Cholesterol 04/19/2024 234 (H)  120 - 199 mg/dL Final    Triglycerides 04/19/2024 77  30 - 150 mg/dL Final    HDL 04/19/2024 66  40 - 75 mg/dL Final    LDL Cholesterol 04/19/2024 152.6  63.0 - 159.0 mg/dL Final    HDL/Cholesterol Ratio 04/19/2024 28.2  20.0 - 50.0 % Final    Total Cholesterol/HDL Ratio 04/19/2024 3.5  2.0 - 5.0 Final    Non-HDL Cholesterol 04/19/2024 168  mg/dL Final    WBC 04/19/2024 5.53  3.90 - 12.70 K/uL Final    RBC 04/19/2024 3.96 (L)  4.00 - 5.40 M/uL Final    Hemoglobin 04/19/2024 13.3  12.0 - 16.0 g/dL Final    Hematocrit 04/19/2024 41.3  37.0 - 48.5 % Final    MCV 04/19/2024 104 (H)  82 - 98 fL Final    MCH 04/19/2024 33.6 (H)  27.0 - 31.0 pg Final    MCHC 04/19/2024 32.2  32.0 - 36.0 g/dL Final    RDW  04/19/2024 12.0  11.5 - 14.5 % Final    Platelets 04/19/2024 256  150 - 450 K/uL Final    MPV 04/19/2024 9.8  9.2 - 12.9 fL Final    Immature Granulocytes 04/19/2024 0.4  0.0 - 0.5 % Final    Gran # (ANC) 04/19/2024 3.2  1.8 - 7.7 K/uL Final    Immature Grans (Abs) 04/19/2024 0.02  0.00 - 0.04 K/uL Final    Lymph # 04/19/2024 1.4  1.0 - 4.8 K/uL Final    Mono # 04/19/2024 0.7  0.3 - 1.0 K/uL Final    Eos # 04/19/2024 0.2  0.0 - 0.5 K/uL Final    Baso # 04/19/2024 0.06  0.00 - 0.20 K/uL Final    nRBC 04/19/2024 0  0 /100 WBC Final    Gran % 04/19/2024 57.3  38.0 - 73.0 % Final    Lymph % 04/19/2024 25.1  18.0 - 48.0 % Final    Mono % 04/19/2024 11.8  4.0 - 15.0 % Final    Eosinophil % 04/19/2024 4.3  0.0 - 8.0 % Final    Basophil % 04/19/2024 1.1  0.0 - 1.9 % Final    Differential Method 04/19/2024 Automated   Final    Sodium 04/19/2024 140  136 - 145 mmol/L Final    Potassium 04/19/2024 4.3  3.5 - 5.1 mmol/L Final    Chloride 04/19/2024 103  95 - 110 mmol/L Final    CO2 04/19/2024 30 (H)  23 - 29 mmol/L Final    Glucose 04/19/2024 77  70 - 110 mg/dL Final    BUN 04/19/2024 18  8 - 23 mg/dL Final    Creatinine 04/19/2024 0.9  0.5 - 1.4 mg/dL Final    Calcium 04/19/2024 8.7  8.7 - 10.5 mg/dL Final    Total Protein 04/19/2024 6.2  6.0 - 8.4 g/dL Final    Albumin 04/19/2024 3.6  3.5 - 5.2 g/dL Final    Total Bilirubin 04/19/2024 0.3  0.1 - 1.0 mg/dL Final    Alkaline Phosphatase 04/19/2024 84  55 - 135 U/L Final    AST 04/19/2024 20  10 - 40 U/L Final    ALT 04/19/2024 16  10 - 44 U/L Final    eGFR 04/19/2024 >60.0  >60 mL/min/1.73 m^2 Final    Anion Gap 04/19/2024 7 (L)  8 - 16 mmol/L Final   Lab Visit on 02/09/2024   Component Date Value Ref Range Status    Free T4 02/09/2024 0.79  0.71 - 1.51 ng/dL Final    TSH 02/09/2024 2.033  0.340 - 5.600 uIU/mL Final    Vit D, 25-Hydroxy 02/09/2024 30  30 - 96 ng/mL Final       Past Medical History:   Diagnosis Date    Arthritis     Back pain     Depression     Hypertension      Hypothyroidism     Kidney stones     MVP (mitral valve prolapse)     hx - palpitations     Social History     Socioeconomic History    Marital status:    Tobacco Use    Smoking status: Never    Smokeless tobacco: Never   Substance and Sexual Activity    Alcohol use: Yes     Alcohol/week: 2.0 standard drinks of alcohol     Types: 2 Glasses of wine per week     Comment: nightly    Drug use: No    Sexual activity: Yes     Partners: Male     Social Determinants of Health     Financial Resource Strain: Low Risk  (3/6/2024)    Overall Financial Resource Strain (CARDIA)     Difficulty of Paying Living Expenses: Not hard at all   Food Insecurity: No Food Insecurity (3/6/2024)    Hunger Vital Sign     Worried About Running Out of Food in the Last Year: Never true     Ran Out of Food in the Last Year: Never true   Transportation Needs: No Transportation Needs (3/6/2024)    PRAPARE - Transportation     Lack of Transportation (Medical): No     Lack of Transportation (Non-Medical): No   Physical Activity: Insufficiently Active (3/6/2024)    Exercise Vital Sign     Days of Exercise per Week: 3 days     Minutes of Exercise per Session: 30 min   Stress: Stress Concern Present (3/6/2024)    [employer] - Occupational Stress Questionnaire     Feeling of Stress : To some extent   Housing Stability: Low Risk  (3/6/2024)    Housing Stability Vital Sign     Unable to Pay for Housing in the Last Year: No     Number of Places Lived in the Last Year: 1     Unstable Housing in the Last Year: No     Past Surgical History:   Procedure Laterality Date     SECTION      DRAINAGE Right 2023    Procedure: Drainage;  Surgeon: Jesus Reeves MD;  Location: [facility] CATH LAB;  Service: Radiology;  Laterality: Right;    NEPHROSTOGRAPHY Left 2023    Procedure: Nephrostogram;  Surgeon: Otoniel Hawk II, MD;  Location: [facility] CATH LAB;  Service: Interventional Radiology;  Laterality: Left;     PERCUTANEOUS NEPHROLITHOTOMY Right 6/20/2023    Procedure: NEPHROLITHOTOMY, PERCUTANEOUS;  Surgeon: Bert Anaya MD;  Location: [facility] OR;  Service: Urology;  Laterality: Right;  Pt will be inpatient. IR will place nephrostomy day prior to surgery.2 HOURS    PERCUTANEOUS NEPHROSTOMY Right 6/19/2023    Procedure: Creation, Nephrostomy, Percutaneous;  Surgeon: Jesus Reeves MD;  Location: [facility] CATH LAB;  Service: Radiology;  Laterality: Right;     Family History   Problem Relation Name Age of Onset    Cancer Mother      COPD Mother      Heart disease Mother      Hypertension Mother      Depression Mother      Hearing loss Mother      Emphysema Father      Breast cancer Paternal Aunt         All of your core healthy metrics are met.      Review of patient's allergies indicates:   Allergen Reactions    Imitrex [sumatriptan] Anaphylaxis    Methylprednisolone Other (See Comments)     Flush and hot sweats    Egg Nausea Only    Penicillins Rash    Sulfa (sulfonamide antibiotics) Rash       Current Outpatient Medications:     acetaminophen (TYLENOL) 500 MG tablet, Take 500 mg by mouth every 6 (six) hours as needed for Pain., Disp: , Rfl:     amLODIPine (NORVASC) 5 MG tablet, Take 1 tablet (5 mg total) by mouth once daily., Disp: 90 tablet, Rfl: 1    ascorbic acid, vitamin C, (VITAMIN C) 500 MG tablet, Take 500 mg by mouth 2 (two) times a day., Disp: , Rfl:     buPROPion (WELLBUTRIN XL) 300 MG 24 hr tablet, Take 1 tablet (300 mg total) by mouth once daily., Disp: 90 tablet, Rfl: 1    cetirizine (ZYRTEC) 10 MG tablet, Take 10 mg by mouth once daily., Disp: , Rfl:     codeine-butalbital-ASA-caffeine (BUTALBITAL COMPOUND W/CODEINE) 15--40 mg Cap, Take 1 capsule by mouth every 6 (six) hours as needed., Disp: 30 capsule, Rfl: 0    diclofenac sodium (VOLTAREN) 1 % Gel, Apply topically 2 (two) times daily as needed., Disp: 100 g, Rfl: 0    EScitalopram oxalate (LEXAPRO) 20 MG tablet, Take 1 tablet (20 mg total) by  mouth once daily., Disp: 90 tablet, Rfl: 1    ferrous sulfate (SLOW FE ORAL), Take by mouth., Disp: , Rfl:     folic acid (FOLVITE) 400 MCG tablet, Take 400 mcg by mouth once daily., Disp: , Rfl:     metoprolol tartrate (LOPRESSOR) 50 MG tablet, Take 1 tablet (50 mg total) by mouth once daily., Disp: 90 tablet, Rfl: 1    PREVIDENT 5000 PLUS 1.1 % Crea, Take by mouth every evening., Disp: , Rfl:     rosuvastatin (CRESTOR) 20 MG tablet, Take 1 tablet (20 mg total) by mouth once daily., Disp: 90 tablet, Rfl: 3    triamcinolone (NASACORT) 55 mcg nasal inhaler, 2 sprays by Nasal route once daily., Disp: 1 g, Rfl: 6    UNITHROID 112 mcg tablet, Take 112 mcg by mouth once daily., Disp: , Rfl:     vitamin D (VITAMIN D3) 1000 units Tab, Take 1,000 Units by mouth once daily., Disp: , Rfl:     B COMPLEX-VITAMIN B12 tablet, 1 tablet  once a day (Patient not taking: Reported on 8/1/2024), Disp: , Rfl:     doxycycline (MONODOX) 100 MG capsule, Take 1 capsule (100 mg total) by mouth 2 (two) times daily., Disp: 14 capsule, Rfl: 0    gabapentin (NEURONTIN) 300 MG capsule, Take 1 capsule (300 mg total) by mouth 2 (two) times daily. (Patient not taking: Reported on 3/12/2024), Disp: 180 capsule, Rfl: 1    ketoconazole (NIZORAL) 2 % cream, Apply topically once daily. (Patient not taking: Reported on 8/1/2024), Disp: 60 g, Rfl: 0    metroNIDAZOLE (FLAGYL) 500 MG tablet, Take 1 tablet (500 mg total) by mouth 3 (three) times daily., Disp: 21 tablet, Rfl: 0    potassium citrate (UROCIT-K 15) 15 mEq TbSR, Take 30 mEq by mouth 2 (two) times a day. (Patient not taking: Reported on 3/12/2024), Disp: 120 tablet, Rfl: 11    promethazine (PHENERGAN) 25 MG tablet, Take 1 tablet (25 mg total) by mouth every 6 (six) hours as needed for Nausea. (Patient not taking: Reported on 8/1/2024), Disp: 20 tablet, Rfl: 0    tiZANidine (ZANAFLEX) 4 MG tablet, Take 1 tablet (4 mg total) by mouth every 8 (eight) hours. (Patient not taking: Reported on 8/1/2024),  "Disp: 30 tablet, Rfl: 0    UNITHROID 100 mcg tablet, Take 100 mcg by mouth once daily. (Patient not taking: Reported on 8/1/2024), Disp: , Rfl:     Review of Systems   Constitutional:  Negative for chills, fatigue and fever.   Cardiovascular:  Negative for chest pain and leg swelling.   Gastrointestinal:  Negative for vomiting.   Musculoskeletal:  Positive for neck pain. Negative for arthralgias and joint swelling.   Skin:  Positive for wound.   Neurological:  Positive for headaches. Negative for weakness.          Objective:      Vitals:    08/01/24 1205   BP: 132/74   Pulse: 64   SpO2: 97%   Weight: 70.3 kg (155 lb)   Height: 5' 6" (1.676 m)     Physical Exam  Vitals and nursing note reviewed.   Constitutional:       General: She is not in acute distress.     Appearance: Normal appearance. She is normal weight. She is not ill-appearing.   Cardiovascular:      Rate and Rhythm: Normal rate.   Pulmonary:      Effort: Pulmonary effort is normal.   Skin:     Comments: Cat bite left hand. Some erythema and swelling surrounding site   Neurological:      Mental Status: She is alert.           Assessment:       1. Hypertension, essential    2. Cat bite, initial encounter         Plan:       Hypertension, essential  Comments:  controlled    Cat bite, initial encounter  Comments:  keep site clean and dry. call if worsens    Other orders  -     doxycycline (MONODOX) 100 MG capsule; Take 1 capsule (100 mg total) by mouth 2 (two) times daily.  Dispense: 14 capsule; Refill: 0  -     metroNIDAZOLE (FLAGYL) 500 MG tablet; Take 1 tablet (500 mg total) by mouth 3 (three) times daily.  Dispense: 21 tablet; Refill: 0      Follow up if symptoms worsen or fail to improve, for medication management.        8/11/2024 Lizy Noble      "

## 2024-08-29 RX ORDER — BUTALBITAL, ASPIRIN, CAFFEINE AND CODEINE PHOSPHATE 50; 325; 40; 30 MG/1; MG/1; MG/1; MG/1
1 CAPSULE ORAL EVERY 6 HOURS PRN
Qty: 30 CAPSULE | Refills: 0 | Status: SHIPPED | OUTPATIENT
Start: 2024-08-29

## 2024-08-29 NOTE — TELEPHONE ENCOUNTER
----- Message from Monik Jaramillo sent at 8/29/2024 11:20 AM CDT -----  - 11:09-pt needs refill on butalbital   [facility]   [phone]

## 2024-08-30 ENCOUNTER — TELEPHONE (OUTPATIENT)
Dept: FAMILY MEDICINE | Facility: CLINIC | Age: 69
End: 2024-08-30
Payer: MEDICARE

## 2024-08-30 DIAGNOSIS — Z79.899 HIGH RISK MEDICATION USE: ICD-10-CM

## 2024-08-30 DIAGNOSIS — Z79.899 ENCOUNTER FOR LONG-TERM (CURRENT) USE OF OTHER MEDICATIONS: Primary | ICD-10-CM

## 2024-09-09 ENCOUNTER — LAB VISIT (OUTPATIENT)
Dept: LAB | Facility: HOSPITAL | Age: 69
End: 2024-09-09
Attending: INTERNAL MEDICINE
Payer: MEDICARE

## 2024-09-09 DIAGNOSIS — E05.00 TOXIC DIFFUSE GOITER WITH PRETIBIAL MYXEDEMA: Primary | ICD-10-CM

## 2024-09-09 DIAGNOSIS — E05.00 TOXIC DIFFUSE GOITER WITH PRETIBIAL MYXEDEMA: ICD-10-CM

## 2024-09-09 DIAGNOSIS — E03.8 TOXIC DIFFUSE GOITER WITH PRETIBIAL MYXEDEMA: Primary | ICD-10-CM

## 2024-09-09 DIAGNOSIS — E03.8 TOXIC DIFFUSE GOITER WITH PRETIBIAL MYXEDEMA: ICD-10-CM

## 2024-09-09 LAB
T4 FREE SERPL-MCNC: 1.09 NG/DL (ref 0.71–1.51)
TSH SERPL DL<=0.005 MIU/L-ACNC: 0.24 UIU/ML (ref 0.34–5.6)

## 2024-09-09 PROCEDURE — 84439 ASSAY OF FREE THYROXINE: CPT | Performed by: INTERNAL MEDICINE

## 2024-09-09 PROCEDURE — 36415 COLL VENOUS BLD VENIPUNCTURE: CPT | Performed by: INTERNAL MEDICINE

## 2024-09-09 PROCEDURE — 84443 ASSAY THYROID STIM HORMONE: CPT | Performed by: INTERNAL MEDICINE

## 2024-09-17 ENCOUNTER — OFFICE VISIT (OUTPATIENT)
Dept: FAMILY MEDICINE | Facility: CLINIC | Age: 69
End: 2024-09-17
Payer: MEDICARE

## 2024-09-17 VITALS
OXYGEN SATURATION: 97 % | SYSTOLIC BLOOD PRESSURE: 120 MMHG | WEIGHT: 152 LBS | DIASTOLIC BLOOD PRESSURE: 74 MMHG | HEART RATE: 78 BPM | BODY MASS INDEX: 25.95 KG/M2 | HEIGHT: 64 IN

## 2024-09-17 DIAGNOSIS — E03.4 HYPOTHYROIDISM DUE TO ACQUIRED ATROPHY OF THYROID: ICD-10-CM

## 2024-09-17 DIAGNOSIS — D64.9 ANEMIA, UNSPECIFIED TYPE: ICD-10-CM

## 2024-09-17 DIAGNOSIS — G43.009 MIGRAINE WITHOUT AURA AND WITHOUT STATUS MIGRAINOSUS, NOT INTRACTABLE: Primary | ICD-10-CM

## 2024-09-17 DIAGNOSIS — Z00.00 PHYSICAL EXAM: ICD-10-CM

## 2024-09-17 DIAGNOSIS — F32.1 MODERATE MAJOR DEPRESSION: ICD-10-CM

## 2024-09-17 DIAGNOSIS — Z79.899 HIGH RISK MEDICATION USE: ICD-10-CM

## 2024-09-17 DIAGNOSIS — E78.5 HYPERLIPIDEMIA, UNSPECIFIED HYPERLIPIDEMIA TYPE: ICD-10-CM

## 2024-09-17 DIAGNOSIS — I10 HYPERTENSION, ESSENTIAL: ICD-10-CM

## 2024-09-17 PROCEDURE — 99214 OFFICE O/P EST MOD 30 MIN: CPT | Mod: S$GLB,,, | Performed by: NURSE PRACTITIONER

## 2024-09-17 PROCEDURE — 3074F SYST BP LT 130 MM HG: CPT | Mod: CPTII,S$GLB,, | Performed by: NURSE PRACTITIONER

## 2024-09-17 PROCEDURE — 3078F DIAST BP <80 MM HG: CPT | Mod: CPTII,S$GLB,, | Performed by: NURSE PRACTITIONER

## 2024-09-17 PROCEDURE — 1159F MED LIST DOCD IN RCRD: CPT | Mod: CPTII,S$GLB,, | Performed by: NURSE PRACTITIONER

## 2024-09-17 PROCEDURE — 1160F RVW MEDS BY RX/DR IN RCRD: CPT | Mod: CPTII,S$GLB,, | Performed by: NURSE PRACTITIONER

## 2024-09-17 PROCEDURE — 1101F PT FALLS ASSESS-DOCD LE1/YR: CPT | Mod: CPTII,S$GLB,, | Performed by: NURSE PRACTITIONER

## 2024-09-17 PROCEDURE — 3008F BODY MASS INDEX DOCD: CPT | Mod: CPTII,S$GLB,, | Performed by: NURSE PRACTITIONER

## 2024-09-17 PROCEDURE — 3288F FALL RISK ASSESSMENT DOCD: CPT | Mod: CPTII,S$GLB,, | Performed by: NURSE PRACTITIONER

## 2024-09-17 PROCEDURE — 1125F AMNT PAIN NOTED PAIN PRSNT: CPT | Mod: CPTII,S$GLB,, | Performed by: NURSE PRACTITIONER

## 2024-09-17 RX ORDER — ESCITALOPRAM OXALATE 20 MG/1
20 TABLET ORAL DAILY
Qty: 90 TABLET | Refills: 1 | Status: SHIPPED | OUTPATIENT
Start: 2024-09-17 | End: 2025-09-17

## 2024-09-17 RX ORDER — TIZANIDINE 4 MG/1
4 TABLET ORAL EVERY 8 HOURS
Qty: 30 TABLET | Refills: 0 | Status: SHIPPED | OUTPATIENT
Start: 2024-09-17

## 2024-09-17 RX ORDER — BUPROPION HYDROCHLORIDE 300 MG/1
300 TABLET ORAL DAILY
Qty: 90 TABLET | Refills: 1 | Status: SHIPPED | OUTPATIENT
Start: 2024-09-17

## 2024-09-17 RX ORDER — PROMETHAZINE HYDROCHLORIDE 25 MG/1
25 TABLET ORAL EVERY 6 HOURS PRN
Qty: 20 TABLET | Refills: 0 | Status: SHIPPED | OUTPATIENT
Start: 2024-09-17

## 2024-09-17 RX ORDER — METOPROLOL TARTRATE 50 MG/1
50 TABLET ORAL DAILY
Qty: 90 TABLET | Refills: 1 | Status: SHIPPED | OUTPATIENT
Start: 2024-09-17

## 2024-09-17 RX ORDER — AMLODIPINE BESYLATE 5 MG/1
5 TABLET ORAL DAILY
Qty: 90 TABLET | Refills: 1 | Status: SHIPPED | OUTPATIENT
Start: 2024-09-17

## 2024-09-17 RX ORDER — BUTALBITAL, ASPIRIN, CAFFEINE AND CODEINE PHOSPHATE 50; 325; 40; 30 MG/1; MG/1; MG/1; MG/1
1 CAPSULE ORAL EVERY 6 HOURS PRN
Qty: 30 CAPSULE | Refills: 0 | Status: SHIPPED | OUTPATIENT
Start: 2024-09-29

## 2024-09-17 NOTE — PROGRESS NOTES
SUBJECTIVE:    Patient ID: Eulalia Bermudez is a 69 y.o. female.    Chief Complaint: Follow-up (Bottles brought//Pt is here for a 6 month follow up//Pt aware of Colonoscopy coming due will schedule with Dr Dai//NEO)    69 year old female presents for check up.treated for migraines, depression, anxiety, hypothyroid. taking both wellbutrin and lexapro.feels like stress/anxiety is well controlled.  Followed by dr. Spencer . Managing her thyroid medication. Bp in office is well controlled.  Sleeping ok. Despite this Feels tired . Last mammogram 4/24. Last dexa 4/24 . Last colonoscopy 2017 . 10 year follow up.     Follow-up  Associated symptoms include headaches and neck pain. Pertinent negatives include no arthralgias, chest pain, joint swelling, vomiting or weakness.       Lab Visit on 09/09/2024   Component Date Value Ref Range Status    Free T4 09/09/2024 1.09  0.71 - 1.51 ng/dL Final    TSH 09/09/2024 0.245 (L)  0.340 - 5.600 uIU/mL Final   Telephone on 08/30/2024   Component Date Value Ref Range Status    Glucose 09/09/2024 84  65 - 99 mg/dL Final    BUN 09/09/2024 24  7 - 25 mg/dL Final    Creatinine 09/09/2024 0.93  0.50 - 1.05 mg/dL Final    eGFR 09/09/2024 67  > OR = 60 mL/min/1.73m2 Final    BUN/Creatinine Ratio 09/09/2024 SEE NOTE:  6 - 22 (calc) Final    Sodium 09/09/2024 142  135 - 146 mmol/L Final    Potassium 09/09/2024 4.8  3.5 - 5.3 mmol/L Final    Chloride 09/09/2024 106  98 - 110 mmol/L Final    CO2 09/09/2024 29  20 - 32 mmol/L Final    Calcium 09/09/2024 9.0  8.6 - 10.4 mg/dL Final    Total Protein 09/09/2024 6.5  6.1 - 8.1 g/dL Final    Albumin 09/09/2024 4.1  3.6 - 5.1 g/dL Final    Globulin, Total 09/09/2024 2.4  1.9 - 3.7 g/dL (calc) Final    Albumin/Globulin Ratio 09/09/2024 1.7  1.0 - 2.5 (calc) Final    Total Bilirubin 09/09/2024 0.3  0.2 - 1.2 mg/dL Final    Alkaline Phosphatase 09/09/2024 102  37 - 153 U/L Final    AST 09/09/2024 59 (H)  10 - 35 U/L Final    ALT 09/09/2024 64 (H)  6 -  29 U/L Final    Cholesterol 09/09/2024 148  <200 mg/dL Final    HDL 09/09/2024 61  > OR = 50 mg/dL Final    Triglycerides 09/09/2024 84  <150 mg/dL Final    LDL Cholesterol 09/09/2024 71  mg/dL (calc) Final    HDL/Cholesterol Ratio 09/09/2024 2.4  <5.0 (calc) Final    Non HDL Chol. (LDL+VLDL) 09/09/2024 87  <130 mg/dL (calc) Final   Patient Outreach on 05/23/2024   Component Date Value Ref Range Status    Fecal Immunochemical Test (iFOBT) 11/07/2023 Not Detected   Final   Lab Visit on 04/19/2024   Component Date Value Ref Range Status    Ferritin 04/19/2024 44.3  20.0 - 300.0 ng/mL Final    Iron 04/19/2024 85  30 - 160 ug/dL Final    Transferrin 04/19/2024 224  200 - 375 mg/dL Final    TIBC 04/19/2024 314  250 - 450 ug/dL Final    Saturated Iron 04/19/2024 27  20 - 50 % Final    Cholesterol 04/19/2024 234 (H)  120 - 199 mg/dL Final    Triglycerides 04/19/2024 77  30 - 150 mg/dL Final    HDL 04/19/2024 66  40 - 75 mg/dL Final    LDL Cholesterol 04/19/2024 152.6  63.0 - 159.0 mg/dL Final    HDL/Cholesterol Ratio 04/19/2024 28.2  20.0 - 50.0 % Final    Total Cholesterol/HDL Ratio 04/19/2024 3.5  2.0 - 5.0 Final    Non-HDL Cholesterol 04/19/2024 168  mg/dL Final    WBC 04/19/2024 5.53  3.90 - 12.70 K/uL Final    RBC 04/19/2024 3.96 (L)  4.00 - 5.40 M/uL Final    Hemoglobin 04/19/2024 13.3  12.0 - 16.0 g/dL Final    Hematocrit 04/19/2024 41.3  37.0 - 48.5 % Final    MCV 04/19/2024 104 (H)  82 - 98 fL Final    MCH 04/19/2024 33.6 (H)  27.0 - 31.0 pg Final    MCHC 04/19/2024 32.2  32.0 - 36.0 g/dL Final    RDW 04/19/2024 12.0  11.5 - 14.5 % Final    Platelets 04/19/2024 256  150 - 450 K/uL Final    MPV 04/19/2024 9.8  9.2 - 12.9 fL Final    Immature Granulocytes 04/19/2024 0.4  0.0 - 0.5 % Final    Gran # (ANC) 04/19/2024 3.2  1.8 - 7.7 K/uL Final    Immature Grans (Abs) 04/19/2024 0.02  0.00 - 0.04 K/uL Final    Lymph # 04/19/2024 1.4  1.0 - 4.8 K/uL Final    Mono # 04/19/2024 0.7  0.3 - 1.0 K/uL Final    Eos # 04/19/2024  0.2  0.0 - 0.5 K/uL Final    Baso # 04/19/2024 0.06  0.00 - 0.20 K/uL Final    nRBC 04/19/2024 0  0 /100 WBC Final    Gran % 04/19/2024 57.3  38.0 - 73.0 % Final    Lymph % 04/19/2024 25.1  18.0 - 48.0 % Final    Mono % 04/19/2024 11.8  4.0 - 15.0 % Final    Eosinophil % 04/19/2024 4.3  0.0 - 8.0 % Final    Basophil % 04/19/2024 1.1  0.0 - 1.9 % Final    Differential Method 04/19/2024 Automated   Final    Sodium 04/19/2024 140  136 - 145 mmol/L Final    Potassium 04/19/2024 4.3  3.5 - 5.1 mmol/L Final    Chloride 04/19/2024 103  95 - 110 mmol/L Final    CO2 04/19/2024 30 (H)  23 - 29 mmol/L Final    Glucose 04/19/2024 77  70 - 110 mg/dL Final    BUN 04/19/2024 18  8 - 23 mg/dL Final    Creatinine 04/19/2024 0.9  0.5 - 1.4 mg/dL Final    Calcium 04/19/2024 8.7  8.7 - 10.5 mg/dL Final    Total Protein 04/19/2024 6.2  6.0 - 8.4 g/dL Final    Albumin 04/19/2024 3.6  3.5 - 5.2 g/dL Final    Total Bilirubin 04/19/2024 0.3  0.1 - 1.0 mg/dL Final    Alkaline Phosphatase 04/19/2024 84  55 - 135 U/L Final    AST 04/19/2024 20  10 - 40 U/L Final    ALT 04/19/2024 16  10 - 44 U/L Final    eGFR 04/19/2024 >60.0  >60 mL/min/1.73 m^2 Final    Anion Gap 04/19/2024 7 (L)  8 - 16 mmol/L Final       Past Medical History:   Diagnosis Date    Arthritis     Back pain     Depression     Hypertension     Hypothyroidism     Kidney stones     MVP (mitral valve prolapse)     hx - palpitations     Social History     Socioeconomic History    Marital status:    Tobacco Use    Smoking status: Never    Smokeless tobacco: Never   Substance and Sexual Activity    Alcohol use: Yes     Alcohol/week: 2.0 standard drinks of alcohol     Types: 2 Glasses of wine per week     Comment: nightly    Drug use: No    Sexual activity: Yes     Partners: Male     Social Drivers of Health     Financial Resource Strain: Low Risk  (3/6/2024)    Overall Financial Resource Strain (CARDIA)     Difficulty of Paying Living Expenses: Not hard at all   Food  Insecurity: No Food Insecurity (3/6/2024)    Hunger Vital Sign     Worried About Running Out of Food in the Last Year: Never true     Ran Out of Food in the Last Year: Never true   Transportation Needs: No Transportation Needs (3/6/2024)    PRAPARE - Transportation     Lack of Transportation (Medical): No     Lack of Transportation (Non-Medical): No   Physical Activity: Insufficiently Active (3/6/2024)    Exercise Vital Sign     Days of Exercise per Week: 3 days     Minutes of Exercise per Session: 30 min   Stress: Stress Concern Present (3/6/2024)    [employer] [employer] [place] [employer] - Occupational Stress Questionnaire     Feeling of Stress : To some extent   Housing Stability: Low Risk  (3/6/2024)    Housing Stability Vital Sign     Unable to Pay for Housing in the Last Year: No     Number of Places Lived in the Last Year: 1     Unstable Housing in the Last Year: No     Past Surgical History:   Procedure Laterality Date     SECTION      DRAINAGE Right 2023    Procedure: Drainage;  Surgeon: Jesus Reeves MD;  Location: [facility] CATH LAB;  Service: Radiology;  Laterality: Right;    NEPHROSTOGRAPHY Left 2023    Procedure: Nephrostogram;  Surgeon: Otoniel Hawk II, MD;  Location: [facility] CATH LAB;  Service: Interventional Radiology;  Laterality: Left;    PERCUTANEOUS NEPHROLITHOTOMY Right 2023    Procedure: NEPHROLITHOTOMY, PERCUTANEOUS;  Surgeon: Bert Anaya MD;  Location: [facility] OR;  Service: Urology;  Laterality: Right;  Pt will be inpatient. IR will place nephrostomy day prior to surgery.2 HOURS    PERCUTANEOUS NEPHROSTOMY Right 2023    Procedure: Creation, Nephrostomy, Percutaneous;  Surgeon: Jesus Reeves MD;  Location: [facility] CATH LAB;  Service: Radiology;  Laterality: Right;     Family History   Problem Relation Name Age of Onset    Cancer Mother      COPD Mother      Heart disease Mother      Hypertension Mother      Depression Mother      Hearing loss Mother       Emphysema Father      Breast cancer Paternal Aunt         All of your core healthy metrics are met.      Review of patient's allergies indicates:   Allergen Reactions    Imitrex [sumatriptan] Anaphylaxis    Methylprednisolone Other (See Comments)     Flush and hot sweats    Egg Nausea Only    Penicillins Rash    Sulfa (sulfonamide antibiotics) Rash       Current Outpatient Medications:     rosuvastatin (CRESTOR) 20 MG tablet, Take 1 tablet (20 mg total) by mouth once daily., Disp: 90 tablet, Rfl: 3    acetaminophen (TYLENOL) 500 MG tablet, Take 500 mg by mouth every 6 (six) hours as needed for Pain., Disp: , Rfl:     amLODIPine (NORVASC) 5 MG tablet, Take 1 tablet (5 mg total) by mouth once daily., Disp: 90 tablet, Rfl: 1    ascorbic acid, vitamin C, (VITAMIN C) 500 MG tablet, Take 500 mg by mouth 2 (two) times a day., Disp: , Rfl:     B COMPLEX-VITAMIN B12 tablet, 1 tablet  once a day (Patient not taking: Reported on 8/1/2024), Disp: , Rfl:     buPROPion (WELLBUTRIN XL) 300 MG 24 hr tablet, Take 1 tablet (300 mg total) by mouth once daily., Disp: 90 tablet, Rfl: 1    cetirizine (ZYRTEC) 10 MG tablet, Take 10 mg by mouth once daily., Disp: , Rfl:     codeine-butalbital-ASA-caffeine (BUTALBITAL COMPOUND W/CODEINE) 32--40 mg Cap, Take 1 capsule by mouth every 6 (six) hours as needed., Disp: 30 capsule, Rfl: 0    diclofenac sodium (VOLTAREN) 1 % Gel, Apply topically 2 (two) times daily as needed., Disp: 100 g, Rfl: 0    EScitalopram oxalate (LEXAPRO) 20 MG tablet, Take 1 tablet (20 mg total) by mouth once daily., Disp: 90 tablet, Rfl: 1    ferrous sulfate (SLOW FE ORAL), Take by mouth., Disp: , Rfl:     folic acid (FOLVITE) 400 MCG tablet, Take 400 mcg by mouth once daily., Disp: , Rfl:     metoprolol tartrate (LOPRESSOR) 50 MG tablet, Take 1 tablet (50 mg total) by mouth once daily., Disp: 90 tablet, Rfl: 1    metroNIDAZOLE (FLAGYL) 500 MG tablet, Take 1 tablet (500 mg total) by mouth 3 (three) times daily.,  "Disp: 21 tablet, Rfl: 0    PREVIDENT 5000 PLUS 1.1 % Crea, Take by mouth every evening., Disp: , Rfl:     promethazine (PHENERGAN) 25 MG tablet, Take 1 tablet (25 mg total) by mouth every 6 (six) hours as needed for Nausea., Disp: 20 tablet, Rfl: 0    tiZANidine (ZANAFLEX) 4 MG tablet, Take 1 tablet (4 mg total) by mouth every 8 (eight) hours., Disp: 30 tablet, Rfl: 0    triamcinolone (NASACORT) 55 mcg nasal inhaler, 2 sprays by Nasal route once daily., Disp: 1 g, Rfl: 6    UNITHROID 112 mcg tablet, Take 112 mcg by mouth once daily., Disp: , Rfl:     vitamin D (VITAMIN D3) 1000 units Tab, Take 1,000 Units by mouth once daily., Disp: , Rfl:     Review of Systems   Constitutional:  Negative for activity change and unexpected weight change.   HENT:  Negative for hearing loss, rhinorrhea and trouble swallowing.    Eyes:  Negative for discharge and visual disturbance.   Respiratory:  Negative for chest tightness and wheezing.    Cardiovascular:  Negative for chest pain and palpitations.   Gastrointestinal:  Negative for blood in stool, constipation, diarrhea and vomiting.   Endocrine: Negative for polydipsia and polyuria.   Genitourinary:  Negative for difficulty urinating, dysuria, hematuria and menstrual problem.   Musculoskeletal:  Positive for neck pain. Negative for arthralgias and joint swelling.   Neurological:  Positive for headaches. Negative for weakness.   Psychiatric/Behavioral:  Negative for confusion and dysphoric mood.           Objective:      Vitals:    09/17/24 1328   BP: 120/74   Pulse: 78   SpO2: 97%   Weight: 68.9 kg (152 lb)   Height: 5' 4" (1.626 m)     Physical Exam  Vitals and nursing note reviewed.   Constitutional:       General: She is not in acute distress.     Appearance: Normal appearance. She is well-developed.   HENT:      Head: Normocephalic.      Right Ear: External ear normal.      Left Ear: External ear normal.   Eyes:      Conjunctiva/sclera: Conjunctivae normal.      Pupils: Pupils " are equal, round, and reactive to light.   Neck:      Vascular: No JVD.   Cardiovascular:      Rate and Rhythm: Normal rate and regular rhythm.      Heart sounds: No murmur heard.  Pulmonary:      Effort: Pulmonary effort is normal.      Breath sounds: Normal breath sounds.   Abdominal:      General: Bowel sounds are normal.      Palpations: Abdomen is soft.   Musculoskeletal:         General: No deformity. Normal range of motion.      Cervical back: Normal range of motion and neck supple.   Lymphadenopathy:      Cervical: No cervical adenopathy.   Skin:     General: Skin is warm and dry.      Findings: No rash.   Neurological:      Mental Status: She is alert and oriented to person, place, and time.      Gait: Gait normal.   Psychiatric:         Speech: Speech normal.         Behavior: Behavior normal.           Assessment:       1. Migraine without aura and without status migrainosus, not intractable    2. Hypertension, essential    3. High risk medication use    4. Physical exam    5. Moderate major depression    6. Hypothyroidism due to acquired atrophy of thyroid    7. Anemia, unspecified type    8. Hyperlipidemia, unspecified hyperlipidemia type         Plan:       Migraine without aura and without status migrainosus, not intractable  Comments:  fiorinal    Hypertension, essential  Comments:  bp controlled  Orders:  -     amLODIPine (NORVASC) 5 MG tablet; Take 1 tablet (5 mg total) by mouth once daily.  Dispense: 90 tablet; Refill: 1  -     metoprolol tartrate (LOPRESSOR) 50 MG tablet; Take 1 tablet (50 mg total) by mouth once daily.  Dispense: 90 tablet; Refill: 1    High risk medication use  -     amLODIPine (NORVASC) 5 MG tablet; Take 1 tablet (5 mg total) by mouth once daily.  Dispense: 90 tablet; Refill: 1  -     buPROPion (WELLBUTRIN XL) 300 MG 24 hr tablet; Take 1 tablet (300 mg total) by mouth once daily.  Dispense: 90 tablet; Refill: 1  -     metoprolol tartrate (LOPRESSOR) 50 MG tablet; Take 1 tablet  (50 mg total) by mouth once daily.  Dispense: 90 tablet; Refill: 1  -     EScitalopram oxalate (LEXAPRO) 20 MG tablet; Take 1 tablet (20 mg total) by mouth once daily.  Dispense: 90 tablet; Refill: 1    Physical exam  -     amLODIPine (NORVASC) 5 MG tablet; Take 1 tablet (5 mg total) by mouth once daily.  Dispense: 90 tablet; Refill: 1  -     buPROPion (WELLBUTRIN XL) 300 MG 24 hr tablet; Take 1 tablet (300 mg total) by mouth once daily.  Dispense: 90 tablet; Refill: 1  -     metoprolol tartrate (LOPRESSOR) 50 MG tablet; Take 1 tablet (50 mg total) by mouth once daily.  Dispense: 90 tablet; Refill: 1  -     EScitalopram oxalate (LEXAPRO) 20 MG tablet; Take 1 tablet (20 mg total) by mouth once daily.  Dispense: 90 tablet; Refill: 1    Moderate major depression  Comments:  increase lexapro to 20  Orders:  -     buPROPion (WELLBUTRIN XL) 300 MG 24 hr tablet; Take 1 tablet (300 mg total) by mouth once daily.  Dispense: 90 tablet; Refill: 1  -     EScitalopram oxalate (LEXAPRO) 20 MG tablet; Take 1 tablet (20 mg total) by mouth once daily.  Dispense: 90 tablet; Refill: 1    Hypothyroidism due to acquired atrophy of thyroid  Comments:  followed by endocrine    Anemia, unspecified type  Comments:  labs    Hyperlipidemia, unspecified hyperlipidemia type  Comments:  crestor    Other orders  -     codeine-butalbital-ASA-caffeine (BUTALBITAL COMPOUND W/CODEINE) 42--40 mg Cap; Take 1 capsule by mouth every 6 (six) hours as needed.  Dispense: 30 capsule; Refill: 0  -     tiZANidine (ZANAFLEX) 4 MG tablet; Take 1 tablet (4 mg total) by mouth every 8 (eight) hours.  Dispense: 30 tablet; Refill: 0  -     promethazine (PHENERGAN) 25 MG tablet; Take 1 tablet (25 mg total) by mouth every 6 (six) hours as needed for Nausea.  Dispense: 20 tablet; Refill: 0      Follow up in about 6 months (around 3/17/2025), or if symptoms worsen or fail to improve, for medication management.        9/29/2024 Lizy Noble

## 2024-10-15 ENCOUNTER — TELEPHONE (OUTPATIENT)
Dept: FAMILY MEDICINE | Facility: CLINIC | Age: 69
End: 2024-10-15
Payer: MEDICARE

## 2024-10-15 NOTE — TELEPHONE ENCOUNTER
----- Message from Monik sent at 10/15/2024  3:11 PM CDT -----  Vm- 2:56- pt needs refill on tj   [facility]   [phone]

## 2024-10-18 ENCOUNTER — TELEPHONE (OUTPATIENT)
Dept: FAMILY MEDICINE | Facility: CLINIC | Age: 69
End: 2024-10-18
Payer: MEDICARE

## 2024-10-18 NOTE — TELEPHONE ENCOUNTER
"Rx set up to be filled 9/29.    Per , patient filled 7/30, 8/30, and then 9/19.    Spoke with patient and let her know the prescription is set up to be filled on 9/29 and she state she doesn't understand why. I explained it's a 30 day supply to be taken PRN and they filled 10 days early last month. She should have 10 extra capsules unless she was having to take it more frequently. She said it's only written as an 8 day supply and she's not sure why it's a "30 day cycle" I let her know it's an as needed medication and if she is having to take more than 1 per day we need to talk to Lizy because she may require further intervention with her migraines. Patient states it's fine to be filled on 9/29, she will pick it up at that time. DEEP to Lizy  "

## 2024-10-18 NOTE — TELEPHONE ENCOUNTER
----- Message from Monik sent at 10/18/2024 11:09 AM CDT -----  Pt left a voicemail on Tuesday and has not heard back about her refill, please advise   [phone]

## 2024-11-01 ENCOUNTER — TELEPHONE (OUTPATIENT)
Dept: FAMILY MEDICINE | Facility: CLINIC | Age: 69
End: 2024-11-01
Payer: MEDICARE

## 2024-11-01 DIAGNOSIS — G44.209 TENSION-TYPE HEADACHE, NOT INTRACTABLE, UNSPECIFIED CHRONICITY PATTERN: Primary | ICD-10-CM

## 2024-11-01 RX ORDER — BUTALBITAL, ASPIRIN, CAFFEINE AND CODEINE PHOSPHATE 50; 325; 40; 30 MG/1; MG/1; MG/1; MG/1
1 CAPSULE ORAL EVERY 6 HOURS PRN
Qty: 30 CAPSULE | Refills: 0 | Status: SHIPPED | OUTPATIENT
Start: 2024-11-01

## 2024-11-01 NOTE — TELEPHONE ENCOUNTER
I dont usually prescribe these for everyday use. She has gone through 30 in a month. Lets hold for Lizy since she knows her better.

## 2024-11-01 NOTE — TELEPHONE ENCOUNTER
----- Message from Lizzeth sent at 11/1/2024  8:55 AM CDT -----  Refill on butalbital   [facility]   [phone]

## 2024-12-03 DIAGNOSIS — G44.209 TENSION-TYPE HEADACHE, NOT INTRACTABLE, UNSPECIFIED CHRONICITY PATTERN: ICD-10-CM

## 2024-12-03 RX ORDER — BUTALBITAL, ASPIRIN, CAFFEINE AND CODEINE PHOSPHATE 50; 325; 40; 30 MG/1; MG/1; MG/1; MG/1
1 CAPSULE ORAL EVERY 6 HOURS PRN
Qty: 30 CAPSULE | Refills: 0 | Status: SHIPPED | OUTPATIENT
Start: 2024-12-03

## 2024-12-03 NOTE — TELEPHONE ENCOUNTER
----- Message from Monik sent at 12/3/2024 11:37 AM CST -----  -11:34- pt needs refill on butalbital   [facility]   [phone]

## 2025-01-07 DIAGNOSIS — G44.209 TENSION-TYPE HEADACHE, NOT INTRACTABLE, UNSPECIFIED CHRONICITY PATTERN: ICD-10-CM

## 2025-01-07 NOTE — TELEPHONE ENCOUNTER
----- Message from Monik sent at 1/7/2025 12:07 PM CST -----  Vm- 10:34- pt needs refill on butalbital   [facility]   [phone]

## 2025-01-08 RX ORDER — BUTALBITAL, ASPIRIN, CAFFEINE AND CODEINE PHOSPHATE 50; 325; 40; 30 MG/1; MG/1; MG/1; MG/1
1 CAPSULE ORAL EVERY 6 HOURS PRN
Qty: 30 CAPSULE | Refills: 0 | Status: SHIPPED | OUTPATIENT
Start: 2025-01-08

## 2025-02-10 DIAGNOSIS — G44.209 TENSION-TYPE HEADACHE, NOT INTRACTABLE, UNSPECIFIED CHRONICITY PATTERN: ICD-10-CM

## 2025-02-10 RX ORDER — BUTALBITAL, ASPIRIN, CAFFEINE AND CODEINE PHOSPHATE 50; 325; 40; 30 MG/1; MG/1; MG/1; MG/1
1 CAPSULE ORAL EVERY 6 HOURS PRN
Qty: 30 CAPSULE | Refills: 0 | Status: SHIPPED | OUTPATIENT
Start: 2025-02-10

## 2025-02-10 NOTE — TELEPHONE ENCOUNTER
----- Message from Monik sent at 2/10/2025 11:07 AM CST -----  Vm- 10:57-pt needs refill on butalbital with codeine   [facility]   [phone]

## 2025-02-24 ENCOUNTER — LAB VISIT (OUTPATIENT)
Dept: LAB | Facility: HOSPITAL | Age: 70
End: 2025-02-24
Attending: INTERNAL MEDICINE
Payer: MEDICARE

## 2025-02-24 DIAGNOSIS — E55.9 AVITAMINOSIS D: ICD-10-CM

## 2025-02-24 DIAGNOSIS — E04.2 NONTOXIC MULTINODULAR GOITER: ICD-10-CM

## 2025-02-24 DIAGNOSIS — E03.8 TOXIC DIFFUSE GOITER WITH PRETIBIAL MYXEDEMA: Primary | ICD-10-CM

## 2025-02-24 DIAGNOSIS — E03.8 TOXIC DIFFUSE GOITER WITH PRETIBIAL MYXEDEMA: ICD-10-CM

## 2025-02-24 DIAGNOSIS — E05.00 TOXIC DIFFUSE GOITER WITH PRETIBIAL MYXEDEMA: Primary | ICD-10-CM

## 2025-02-24 DIAGNOSIS — E05.00 TOXIC DIFFUSE GOITER WITH PRETIBIAL MYXEDEMA: ICD-10-CM

## 2025-02-24 LAB
25(OH)D3+25(OH)D2 SERPL-MCNC: 49 NG/ML (ref 30–96)
T4 FREE SERPL-MCNC: 0.79 NG/DL (ref 0.71–1.51)
TSH SERPL DL<=0.005 MIU/L-ACNC: 8.83 UIU/ML (ref 0.34–5.6)

## 2025-02-24 PROCEDURE — 36415 COLL VENOUS BLD VENIPUNCTURE: CPT | Performed by: INTERNAL MEDICINE

## 2025-02-24 PROCEDURE — 84443 ASSAY THYROID STIM HORMONE: CPT | Performed by: INTERNAL MEDICINE

## 2025-02-24 PROCEDURE — 84439 ASSAY OF FREE THYROXINE: CPT | Performed by: INTERNAL MEDICINE

## 2025-02-24 PROCEDURE — 82306 VITAMIN D 25 HYDROXY: CPT | Performed by: INTERNAL MEDICINE

## 2025-03-03 ENCOUNTER — TELEPHONE (OUTPATIENT)
Dept: FAMILY MEDICINE | Facility: CLINIC | Age: 70
End: 2025-03-03
Payer: MEDICARE

## 2025-03-03 DIAGNOSIS — Z79.899 HIGH RISK MEDICATION USE: Primary | ICD-10-CM

## 2025-03-03 DIAGNOSIS — I10 HYPERTENSION, ESSENTIAL: ICD-10-CM

## 2025-03-03 DIAGNOSIS — E78.5 HYPERLIPIDEMIA, UNSPECIFIED HYPERLIPIDEMIA TYPE: ICD-10-CM

## 2025-03-03 DIAGNOSIS — D64.9 ANEMIA, UNSPECIFIED TYPE: ICD-10-CM

## 2025-03-05 DIAGNOSIS — Z00.00 PHYSICAL EXAM: ICD-10-CM

## 2025-03-05 DIAGNOSIS — F32.1 MODERATE MAJOR DEPRESSION: ICD-10-CM

## 2025-03-05 DIAGNOSIS — Z79.899 HIGH RISK MEDICATION USE: ICD-10-CM

## 2025-03-05 DIAGNOSIS — I10 HYPERTENSION, ESSENTIAL: ICD-10-CM

## 2025-03-05 RX ORDER — BUPROPION HYDROCHLORIDE 300 MG/1
300 TABLET ORAL DAILY
Qty: 90 TABLET | Refills: 1 | Status: SHIPPED | OUTPATIENT
Start: 2025-03-05

## 2025-03-05 RX ORDER — METOPROLOL TARTRATE 50 MG/1
50 TABLET ORAL DAILY
Qty: 90 TABLET | Refills: 1 | Status: SHIPPED | OUTPATIENT
Start: 2025-03-05

## 2025-03-05 RX ORDER — AMLODIPINE BESYLATE 5 MG/1
5 TABLET ORAL DAILY
Qty: 90 TABLET | Refills: 1 | Status: SHIPPED | OUTPATIENT
Start: 2025-03-05

## 2025-03-05 NOTE — TELEPHONE ENCOUNTER
----- Message from Monik sent at 3/5/2025 12:34 PM CST -----  Pt needs refill on bupropion, amlodipine, metoprolol [facility] [phone]

## 2025-03-06 ENCOUNTER — LAB VISIT (OUTPATIENT)
Dept: LAB | Facility: HOSPITAL | Age: 70
End: 2025-03-06
Attending: FAMILY MEDICINE
Payer: MEDICARE

## 2025-03-06 DIAGNOSIS — Z79.899 HIGH RISK MEDICATION USE: ICD-10-CM

## 2025-03-06 DIAGNOSIS — E78.5 HYPERLIPIDEMIA, UNSPECIFIED HYPERLIPIDEMIA TYPE: ICD-10-CM

## 2025-03-06 DIAGNOSIS — D64.9 ANEMIA, UNSPECIFIED TYPE: ICD-10-CM

## 2025-03-06 DIAGNOSIS — I10 HYPERTENSION, ESSENTIAL: ICD-10-CM

## 2025-03-06 LAB
ALBUMIN SERPL BCP-MCNC: 3.9 G/DL (ref 3.5–5.2)
ALBUMIN/CREAT UR: 17.3 UG/MG (ref 0–30)
ALP SERPL-CCNC: 92 U/L (ref 55–135)
ALT SERPL W/O P-5'-P-CCNC: 104 U/L (ref 10–44)
ANION GAP SERPL CALC-SCNC: 4 MMOL/L (ref 8–16)
AST SERPL-CCNC: 86 U/L (ref 10–40)
BACTERIA #/AREA URNS HPF: ABNORMAL /HPF
BASOPHILS # BLD AUTO: 0.04 K/UL (ref 0–0.2)
BASOPHILS NFR BLD: 0.8 % (ref 0–1.9)
BILIRUB SERPL-MCNC: 0.3 MG/DL (ref 0.1–1)
BILIRUB UR QL STRIP: NEGATIVE
BUN SERPL-MCNC: 21 MG/DL (ref 8–23)
CALCIUM SERPL-MCNC: 8.3 MG/DL (ref 8.7–10.5)
CHLORIDE SERPL-SCNC: 108 MMOL/L (ref 95–110)
CHOLEST SERPL-MCNC: 159 MG/DL (ref 120–199)
CHOLEST/HDLC SERPL: 2 {RATIO} (ref 2–5)
CLARITY UR: CLEAR
CO2 SERPL-SCNC: 29 MMOL/L (ref 23–29)
COLOR UR: YELLOW
CREAT SERPL-MCNC: 0.9 MG/DL (ref 0.5–1.4)
CREAT UR-MCNC: 105.9 MG/DL (ref 15–325)
DIFFERENTIAL METHOD BLD: ABNORMAL
EOSINOPHIL # BLD AUTO: 0.3 K/UL (ref 0–0.5)
EOSINOPHIL NFR BLD: 5.1 % (ref 0–8)
ERYTHROCYTE [DISTWIDTH] IN BLOOD BY AUTOMATED COUNT: 12.5 % (ref 11.5–14.5)
EST. GFR  (NO RACE VARIABLE): >60 ML/MIN/1.73 M^2
GLUCOSE SERPL-MCNC: 90 MG/DL (ref 70–110)
GLUCOSE UR QL STRIP: NEGATIVE
HCT VFR BLD AUTO: 42.9 % (ref 37–48.5)
HDLC SERPL-MCNC: 80 MG/DL (ref 40–75)
HDLC SERPL: 50.3 % (ref 20–50)
HGB BLD-MCNC: 13.7 G/DL (ref 12–16)
HGB UR QL STRIP: ABNORMAL
IMM GRANULOCYTES # BLD AUTO: 0.01 K/UL (ref 0–0.04)
IMM GRANULOCYTES NFR BLD AUTO: 0.2 % (ref 0–0.5)
KETONES UR QL STRIP: NEGATIVE
LDLC SERPL CALC-MCNC: 65.2 MG/DL (ref 63–159)
LEUKOCYTE ESTERASE UR QL STRIP: ABNORMAL
LYMPHOCYTES # BLD AUTO: 1.8 K/UL (ref 1–4.8)
LYMPHOCYTES NFR BLD: 35.2 % (ref 18–48)
MCH RBC QN AUTO: 33.1 PG (ref 27–31)
MCHC RBC AUTO-ENTMCNC: 31.9 G/DL (ref 32–36)
MCV RBC AUTO: 104 FL (ref 82–98)
MICROALBUMIN UR DL<=1MG/L-MCNC: 18.3 UG/ML
MICROSCOPIC COMMENT: ABNORMAL
MONOCYTES # BLD AUTO: 0.5 K/UL (ref 0.3–1)
MONOCYTES NFR BLD: 9.4 % (ref 4–15)
NEUTROPHILS # BLD AUTO: 2.5 K/UL (ref 1.8–7.7)
NEUTROPHILS NFR BLD: 49.3 % (ref 38–73)
NITRITE UR QL STRIP: NEGATIVE
NONHDLC SERPL-MCNC: 79 MG/DL
NRBC BLD-RTO: 0 /100 WBC
PH UR STRIP: 6 [PH] (ref 5–8)
PLATELET # BLD AUTO: 246 K/UL (ref 150–450)
PMV BLD AUTO: 10.2 FL (ref 9.2–12.9)
POTASSIUM SERPL-SCNC: 4.3 MMOL/L (ref 3.5–5.1)
PROT SERPL-MCNC: 6.2 G/DL (ref 6–8.4)
PROT UR QL STRIP: NEGATIVE
RBC # BLD AUTO: 4.14 M/UL (ref 4–5.4)
RBC #/AREA URNS HPF: 7 /HPF (ref 0–4)
SODIUM SERPL-SCNC: 141 MMOL/L (ref 136–145)
SP GR UR STRIP: 1.02 (ref 1–1.03)
SQUAMOUS #/AREA URNS HPF: 0 /HPF
TRIGL SERPL-MCNC: 69 MG/DL (ref 30–150)
UNIDENT CRYS URNS QL MICRO: 1
URN SPEC COLLECT METH UR: ABNORMAL
UROBILINOGEN UR STRIP-ACNC: NEGATIVE EU/DL
WBC # BLD AUTO: 5.08 K/UL (ref 3.9–12.7)
WBC #/AREA URNS HPF: 7 /HPF (ref 0–5)
YEAST URNS QL MICRO: ABNORMAL

## 2025-03-06 PROCEDURE — 82570 ASSAY OF URINE CREATININE: CPT | Performed by: FAMILY MEDICINE

## 2025-03-06 PROCEDURE — 80061 LIPID PANEL: CPT | Performed by: FAMILY MEDICINE

## 2025-03-06 PROCEDURE — 85025 COMPLETE CBC W/AUTO DIFF WBC: CPT | Performed by: FAMILY MEDICINE

## 2025-03-06 PROCEDURE — 36415 COLL VENOUS BLD VENIPUNCTURE: CPT | Performed by: FAMILY MEDICINE

## 2025-03-06 PROCEDURE — 80053 COMPREHEN METABOLIC PANEL: CPT | Performed by: FAMILY MEDICINE

## 2025-03-06 PROCEDURE — 81001 URINALYSIS AUTO W/SCOPE: CPT | Performed by: FAMILY MEDICINE

## 2025-03-07 ENCOUNTER — RESULTS FOLLOW-UP (OUTPATIENT)
Dept: FAMILY MEDICINE | Facility: CLINIC | Age: 70
End: 2025-03-07
Payer: MEDICARE

## 2025-03-07 ENCOUNTER — TELEPHONE (OUTPATIENT)
Dept: FAMILY MEDICINE | Facility: CLINIC | Age: 70
End: 2025-03-07
Payer: MEDICARE

## 2025-03-07 NOTE — PROGRESS NOTES
Call patient.  Her cholesterol is much improved with cholesterol at 159 triglycerides 69.  However her liver enzymes are slowly creeping up with AST 86 .  This could be due to her cholesterol medication rosuvastatin 20 mg.  Rest of her lab work is normal.  Would recommend reducing the rosuvastatin to Monday Wednesday Friday only and recheck CMP lipids again in 2 months

## 2025-03-10 NOTE — TELEPHONE ENCOUNTER
Ca is low. Taking any ca supplements?. Liver enzymes are high. Needs to decrease alcohol intake. Repeat labs in 2 weeks.

## 2025-03-10 NOTE — TELEPHONE ENCOUNTER
----- Message from Monik sent at 3/10/2025  7:20 AM CDT -----  - 3/7-2:04 pm-  pt is calling itz back  [phone]

## 2025-03-11 RX ORDER — ROSUVASTATIN CALCIUM 20 MG/1
20 TABLET, COATED ORAL
Status: CANCELLED
Start: 2025-03-12 | End: 2026-03-12

## 2025-03-11 NOTE — TELEPHONE ENCOUNTER
----- Message from Monik sent at 3/11/2025 10:31 AM CDT -----  - 10:27-pt is calling itz back [phone]

## 2025-03-11 NOTE — TELEPHONE ENCOUNTER
Patient said she is not taking calcium supplements. I called her with Dr Brady's result note. Please let me know how to proceed. Below is result note from him.    Call patient.  Her cholesterol is much improved with cholesterol at 159 triglycerides 69.  However her liver enzymes are slowly creeping up with AST 86 .  This could be due to her cholesterol medication rosuvastatin 20 mg.  Rest of her lab work is normal.  Would recommend reducing the rosuvastatin to Monday Wednesday Friday only and recheck CMP lipids again in 2 months    I created remind me for 2 month lab if Lizy is ok with that.

## 2025-03-12 RX ORDER — ROSUVASTATIN CALCIUM 20 MG/1
20 TABLET, COATED ORAL
Qty: 36 TABLET | Refills: 1
Start: 2025-03-12 | End: 2026-03-12

## 2025-03-19 ENCOUNTER — OFFICE VISIT (OUTPATIENT)
Dept: FAMILY MEDICINE | Facility: CLINIC | Age: 70
End: 2025-03-19
Payer: MEDICARE

## 2025-03-19 VITALS
HEIGHT: 64 IN | SYSTOLIC BLOOD PRESSURE: 112 MMHG | TEMPERATURE: 98 F | WEIGHT: 156 LBS | BODY MASS INDEX: 26.63 KG/M2 | HEART RATE: 79 BPM | OXYGEN SATURATION: 96 % | DIASTOLIC BLOOD PRESSURE: 74 MMHG

## 2025-03-19 DIAGNOSIS — F32.1 MODERATE MAJOR DEPRESSION: ICD-10-CM

## 2025-03-19 DIAGNOSIS — Z00.00 PHYSICAL EXAM: ICD-10-CM

## 2025-03-19 DIAGNOSIS — N20.0 NEPHROLITHIASIS: ICD-10-CM

## 2025-03-19 DIAGNOSIS — B33.8 RSV INFECTION: Primary | ICD-10-CM

## 2025-03-19 DIAGNOSIS — E03.4 HYPOTHYROIDISM DUE TO ACQUIRED ATROPHY OF THYROID: ICD-10-CM

## 2025-03-19 DIAGNOSIS — I10 HYPERTENSION, ESSENTIAL: ICD-10-CM

## 2025-03-19 DIAGNOSIS — Z79.899 HIGH RISK MEDICATION USE: ICD-10-CM

## 2025-03-19 DIAGNOSIS — G44.209 TENSION-TYPE HEADACHE, NOT INTRACTABLE, UNSPECIFIED CHRONICITY PATTERN: ICD-10-CM

## 2025-03-19 DIAGNOSIS — J30.1 SEASONAL ALLERGIC RHINITIS DUE TO POLLEN: ICD-10-CM

## 2025-03-19 DIAGNOSIS — R74.8 ELEVATED LIVER ENZYMES: ICD-10-CM

## 2025-03-19 DIAGNOSIS — Z87.442 HISTORY OF RENAL CALCULI: ICD-10-CM

## 2025-03-19 DIAGNOSIS — Z12.31 OTHER SCREENING MAMMOGRAM: ICD-10-CM

## 2025-03-19 DIAGNOSIS — G43.009 MIGRAINE WITHOUT AURA AND WITHOUT STATUS MIGRAINOSUS, NOT INTRACTABLE: ICD-10-CM

## 2025-03-19 DIAGNOSIS — R53.82 CHRONIC FATIGUE: ICD-10-CM

## 2025-03-19 PROCEDURE — 1101F PT FALLS ASSESS-DOCD LE1/YR: CPT | Mod: CPTII,S$GLB,, | Performed by: FAMILY MEDICINE

## 2025-03-19 PROCEDURE — 3078F DIAST BP <80 MM HG: CPT | Mod: CPTII,S$GLB,, | Performed by: FAMILY MEDICINE

## 2025-03-19 PROCEDURE — 3074F SYST BP LT 130 MM HG: CPT | Mod: CPTII,S$GLB,, | Performed by: FAMILY MEDICINE

## 2025-03-19 PROCEDURE — 3066F NEPHROPATHY DOC TX: CPT | Mod: CPTII,S$GLB,, | Performed by: FAMILY MEDICINE

## 2025-03-19 PROCEDURE — 99214 OFFICE O/P EST MOD 30 MIN: CPT | Mod: S$GLB,,, | Performed by: FAMILY MEDICINE

## 2025-03-19 PROCEDURE — 3008F BODY MASS INDEX DOCD: CPT | Mod: CPTII,S$GLB,, | Performed by: FAMILY MEDICINE

## 2025-03-19 PROCEDURE — 3288F FALL RISK ASSESSMENT DOCD: CPT | Mod: CPTII,S$GLB,, | Performed by: FAMILY MEDICINE

## 2025-03-19 PROCEDURE — 1159F MED LIST DOCD IN RCRD: CPT | Mod: CPTII,S$GLB,, | Performed by: FAMILY MEDICINE

## 2025-03-19 PROCEDURE — 3061F NEG MICROALBUMINURIA REV: CPT | Mod: CPTII,S$GLB,, | Performed by: FAMILY MEDICINE

## 2025-03-19 RX ORDER — BUPROPION HYDROCHLORIDE 300 MG/1
300 TABLET ORAL DAILY
Qty: 90 TABLET | Refills: 3 | Status: CANCELLED | OUTPATIENT
Start: 2025-03-19

## 2025-03-19 RX ORDER — ROSUVASTATIN CALCIUM 20 MG/1
20 TABLET, COATED ORAL
Status: CANCELLED
Start: 2025-03-19 | End: 2026-03-19

## 2025-03-19 RX ORDER — TIZANIDINE 4 MG/1
4 TABLET ORAL EVERY 8 HOURS
Qty: 30 TABLET | Refills: 0 | Status: CANCELLED | OUTPATIENT
Start: 2025-03-19

## 2025-03-19 RX ORDER — AMLODIPINE BESYLATE 5 MG/1
5 TABLET ORAL DAILY
Qty: 90 TABLET | Refills: 3 | Status: SHIPPED | OUTPATIENT
Start: 2025-03-19

## 2025-03-19 RX ORDER — BUTALBITAL, ASPIRIN, CAFFEINE AND CODEINE PHOSPHATE 50; 325; 40; 30 MG/1; MG/1; MG/1; MG/1
1 CAPSULE ORAL EVERY 6 HOURS PRN
Qty: 30 CAPSULE | Refills: 0 | Status: SHIPPED | OUTPATIENT
Start: 2025-03-19

## 2025-03-19 RX ORDER — ESCITALOPRAM OXALATE 20 MG/1
20 TABLET ORAL DAILY
Qty: 90 TABLET | Refills: 3 | Status: SHIPPED | OUTPATIENT
Start: 2025-03-19 | End: 2026-03-19

## 2025-03-19 RX ORDER — PROMETHAZINE HYDROCHLORIDE 25 MG/1
25 TABLET ORAL EVERY 6 HOURS PRN
Qty: 20 TABLET | Refills: 0 | Status: CANCELLED | OUTPATIENT
Start: 2025-03-19

## 2025-03-19 NOTE — PROGRESS NOTES
SUBJECTIVE:    Patient ID: Eulalia Bermudez is a 69 y.o. female.    Chief Complaint: Follow-up (Brought bottles, need refills, mammogram ordered, fatigue, abc )    Follow-up  Associated symptoms include arthralgias, coughing, fatigue, headaches and neck pain. Pertinent negatives include no chest pain, joint swelling, vomiting or weakness.       History of Present Illness    CHIEF COMPLAINT:  Eulalia presents today with symptoms of RSV.    HISTORY OF PRESENT ILLNESS:  She reports RSV that began Saturday, worsened , and is now improving. Current symptoms include coughing with wheezing, headache without fever, mild body aches, loose stools, and yellow to clear mucus production. She experiences sinus pressure and pain radiating from sinuses to above eyes. She reports episodes of vomiting triggered by coughing fits. She has been taking DayQuil during the day and Formula 44 cough syrup for symptom management. She attempted NyQuil but discontinued due to sleep interference.    MEDICAL HISTORY:  She has a history of kidney issues from two years ago including cyst and bilateral kidney stones requiring nephrostomy tube placement. She experiences hip pain during sleep that can cause nighttime awakening.    CARDIOVASCULAR:  She has irregular heartbeat with occasional palpitations, which have improved with metoprolol.    MEDICATIONS:  She takes rosuvastatin 20mg daily for cholesterol management, with dosage reduced due to liver function concerns for the last 2 weeks.. She takes metoprolol for irregular heartbeat. Her thyroid medication follows a variable dosing regimen: 112mcg on Tuesday, Thursday, Saturday, and , and 100mcg on Monday, Wednesday, and Friday.    FAMILY HISTORY:  Both parents  from lung issues due to heavy smoking. Her sister  suddenly from a brain tumor. Two paternal cousins had multiple sclerosis (MS) - one diagnosed in her 20s who  from complications, while the other was diagnosed after  age 60.      ROS:  Constitutional: -appetite change, -chills, -fatigue, -fever, -unexpected weight change  HENT: -ear pain, -trouble swallowing, +nasal congestion, +sinus pressure  Eyes: -pain, -discharge, -visual disturbance  Respiratory: -apnea, +cough, -shortness of breath, +wheezing, +waking at night coughing  Cardiovascular: -chest pain, -leg swelling, +palpitations  Gastrointestinal: -abdominal pain, -blood in stool, -constipation, +diarrhea, -nausea, +vomiting, -reflux  Endocrine: -cold intolerance, -heat intolerance, -polydipsia  Genitourinary: -bladder incontinence, -dysuria, -erectile dysfunction, -frequency, -hematuria, -testicular pain, -urgency, -nocturia  Musculoskeletal: -gait problem, -joint swelling, -myalgia, +body aches, +limb pain, +nightime pain, +pain with movement  Neurological: -dizziness, -seizures, -numbness  Psychiatric/Behavioral: -agitation, -hallucinations, -nervous, -anxiety symptoms         Lab Visit on 03/06/2025   Component Date Value Ref Range Status    Specimen UA 03/06/2025 Urine, Clean Catch   Final    Color, UA 03/06/2025 Yellow  Yellow, Straw, Maci Final    Appearance, UA 03/06/2025 Clear  Clear Final    pH, UA 03/06/2025 6.0  5.0 - 8.0 Final    Specific Gravity, UA 03/06/2025 1.025  1.005 - 1.030 Final    Protein, UA 03/06/2025 Negative  Negative Final    Glucose, UA 03/06/2025 Negative  Negative Final    Ketones, UA 03/06/2025 Negative  Negative Final    Bilirubin (UA) 03/06/2025 Negative  Negative Final    Occult Blood UA 03/06/2025 1+ (A)  Negative Final    Nitrite, UA 03/06/2025 Negative  Negative Final    Urobilinogen, UA 03/06/2025 Negative  Negative EU/dL Final    Leukocytes, UA 03/06/2025 1+ (A)  Negative Final    Microalbumin, Urine 03/06/2025 18.3  <19.9 ug/mL Final    Creatinine, Urine 03/06/2025 105.9  15.0 - 325.0 mg/dL Final    Microalb/Creat Ratio 03/06/2025 17.3  0.0 - 30.0 ug/mg Final    WBC 03/06/2025 5.08  3.90 - 12.70 K/uL Final    RBC 03/06/2025 4.14   4.00 - 5.40 M/uL Final    Hemoglobin 03/06/2025 13.7  12.0 - 16.0 g/dL Final    Hematocrit 03/06/2025 42.9  37.0 - 48.5 % Final    MCV 03/06/2025 104 (H)  82 - 98 fL Final    MCH 03/06/2025 33.1 (H)  27.0 - 31.0 pg Final    MCHC 03/06/2025 31.9 (L)  32.0 - 36.0 g/dL Final    RDW 03/06/2025 12.5  11.5 - 14.5 % Final    Platelets 03/06/2025 246  150 - 450 K/uL Final    MPV 03/06/2025 10.2  9.2 - 12.9 fL Final    Immature Granulocytes 03/06/2025 0.2  0.0 - 0.5 % Final    Gran # (ANC) 03/06/2025 2.5  1.8 - 7.7 K/uL Final    Immature Grans (Abs) 03/06/2025 0.01  0.00 - 0.04 K/uL Final    Lymph # 03/06/2025 1.8  1.0 - 4.8 K/uL Final    Mono # 03/06/2025 0.5  0.3 - 1.0 K/uL Final    Eos # 03/06/2025 0.3  0.0 - 0.5 K/uL Final    Baso # 03/06/2025 0.04  0.00 - 0.20 K/uL Final    nRBC 03/06/2025 0  0 /100 WBC Final    Gran % 03/06/2025 49.3  38.0 - 73.0 % Final    Lymph % 03/06/2025 35.2  18.0 - 48.0 % Final    Mono % 03/06/2025 9.4  4.0 - 15.0 % Final    Eosinophil % 03/06/2025 5.1  0.0 - 8.0 % Final    Basophil % 03/06/2025 0.8  0.0 - 1.9 % Final    Differential Method 03/06/2025 Automated   Final    Sodium 03/06/2025 141  136 - 145 mmol/L Final    Potassium 03/06/2025 4.3  3.5 - 5.1 mmol/L Final    Chloride 03/06/2025 108  95 - 110 mmol/L Final    CO2 03/06/2025 29  23 - 29 mmol/L Final    Glucose 03/06/2025 90  70 - 110 mg/dL Final    BUN 03/06/2025 21  8 - 23 mg/dL Final    Creatinine 03/06/2025 0.9  0.5 - 1.4 mg/dL Final    Calcium 03/06/2025 8.3 (L)  8.7 - 10.5 mg/dL Final    Total Protein 03/06/2025 6.2  6.0 - 8.4 g/dL Final    Albumin 03/06/2025 3.9  3.5 - 5.2 g/dL Final    Total Bilirubin 03/06/2025 0.3  0.1 - 1.0 mg/dL Final    Alkaline Phosphatase 03/06/2025 92  55 - 135 U/L Final    AST 03/06/2025 86 (H)  10 - 40 U/L Final    ALT 03/06/2025 104 (H)  10 - 44 U/L Final    eGFR 03/06/2025 >60.0  >60 mL/min/1.73 m^2 Final    Anion Gap 03/06/2025 4 (L)  8 - 16 mmol/L Final    Cholesterol 03/06/2025 159  120 - 199  mg/dL Final    Triglycerides 2025 69  30 - 150 mg/dL Final    HDL 2025 80 (H)  40 - 75 mg/dL Final    LDL Cholesterol 2025 65.2  63.0 - 159.0 mg/dL Final    HDL/Cholesterol Ratio 2025 50.3 (H)  20.0 - 50.0 % Final    Total Cholesterol/HDL Ratio 2025 2.0  2.0 - 5.0 Final    Non-HDL Cholesterol 2025 79  mg/dL Final    RBC, UA 2025 7 (H)  0 - 4 /hpf Final    WBC, UA 2025 7 (H)  0 - 5 /hpf Final    Bacteria 2025 Rare  None-Occ /hpf Final    Yeast, UA 2025 Rare (A)  None Final    Squam Epithel, UA 2025 0  /hpf Final    Unclass Cinthia UA 2025 1  None-Moderate Final    Microscopic Comment 2025 SEE COMMENT   Final   Lab Visit on 2025   Component Date Value Ref Range Status    Free T4 2025 0.79  0.71 - 1.51 ng/dL Final    TSH 2025 8.832 (H)  0.340 - 5.600 uIU/mL Final    Vit D, 25-Hydroxy 2025 49  30 - 96 ng/mL Final       Past Medical History:   Diagnosis Date    Arthritis     Back pain     Depression     Hypertension     Hypothyroidism     Kidney stones     MVP (mitral valve prolapse)     hx - palpitations     Social History[1]  Past Surgical History:   Procedure Laterality Date     SECTION      DRAINAGE Right 2023    Procedure: Drainage;  Surgeon: Jesus Reeves MD;  Location: [facility] CATH LAB;  Service: Radiology;  Laterality: Right;    NEPHROSTOGRAPHY Left 2023    Procedure: Nephrostogram;  Surgeon: Otoniel Hawk II, MD;  Location: [facility] CATH LAB;  Service: Interventional Radiology;  Laterality: Left;    PERCUTANEOUS NEPHROLITHOTOMY Right 2023    Procedure: NEPHROLITHOTOMY, PERCUTANEOUS;  Surgeon: Bert Anaya MD;  Location: [facility] OR;  Service: Urology;  Laterality: Right;  Pt will be inpatient. IR will place nephrostomy day prior to surgery.2 HOURS    PERCUTANEOUS NEPHROSTOMY Right 2023    Procedure: Creation, Nephrostomy, Percutaneous;  Surgeon: Jesus Reeves MD;  Location:  [facility] C[facility] LAB;  Service: Radiology;  Laterality: Right;     Family History   Problem Relation Name Age of Onset    Cancer Mother      COPD Mother      Heart disease Mother      Hypertension Mother      Depression Mother      Hearing loss Mother      Emphysema Father      Breast cancer Paternal Aunt         The 10-year CVD risk score (Usha, et al., 2008) is: 5%    Values used to calculate the score:      Age: 69 years      Sex: Female      Diabetic: No      Tobacco smoker: No      Systolic Blood Pressure: 112 mmHg      Is BP treated: Yes      HDL Cholesterol: 80 mg/dL      Total Cholesterol: 159 mg/dL    All of your core healthy metrics are met.      Review of patient's allergies indicates:   Allergen Reactions    Imitrex [sumatriptan] Anaphylaxis    Methylprednisolone Other (See Comments)     Flush and hot sweats    Egg Nausea Only    Penicillins Rash    Sulfa (sulfonamide antibiotics) Rash     Current Medications[2]    Review of Systems   Constitutional:  Positive for fatigue. Negative for activity change and unexpected weight change.   HENT:  Positive for rhinorrhea. Negative for hearing loss and trouble swallowing.    Eyes:  Negative for discharge and visual disturbance.   Respiratory:  Positive for cough and wheezing. Negative for chest tightness.    Cardiovascular:  Negative for chest pain and palpitations.   Gastrointestinal:  Positive for diarrhea. Negative for blood in stool, constipation and vomiting.   Endocrine: Negative for polydipsia and polyuria.   Genitourinary:  Negative for difficulty urinating, dysuria, hematuria and menstrual problem.   Musculoskeletal:  Positive for arthralgias and neck pain. Negative for joint swelling.   Neurological:  Positive for headaches. Negative for weakness.   Psychiatric/Behavioral:  Positive for dysphoric mood. Negative for confusion.            Objective:      Vitals:    03/19/25 1358   BP: 112/74   Pulse: 79   Temp: 98.4 °F (36.9 °C)   SpO2: 96%   Weight: 70.8  "kg (156 lb)   Height: 5' 4" (1.626 m)     Physical Exam  Vitals and nursing note reviewed.   Constitutional:       General: She is not in acute distress.     Appearance: Normal appearance. She is well-developed. She is not toxic-appearing.   HENT:      Head: Normocephalic and atraumatic.      Right Ear: Tympanic membrane and external ear normal.      Left Ear: Tympanic membrane and external ear normal.      Nose: Nose normal.      Mouth/Throat:      Pharynx: Oropharynx is clear. No posterior oropharyngeal erythema.   Eyes:      Pupils: Pupils are equal, round, and reactive to light.   Neck:      Thyroid: No thyromegaly.      Vascular: No carotid bruit.   Cardiovascular:      Rate and Rhythm: Normal rate and regular rhythm.      Heart sounds: Normal heart sounds. No murmur heard.  Pulmonary:      Effort: Pulmonary effort is normal.      Breath sounds: Normal breath sounds. No wheezing or rales.   Abdominal:      General: Bowel sounds are normal. There is no distension.      Palpations: Abdomen is soft.      Tenderness: There is no abdominal tenderness.   Musculoskeletal:         General: No tenderness or deformity. Normal range of motion.      Cervical back: Normal range of motion and neck supple.      Lumbar back: Normal. No spasms.      Comments: Bends 90 degrees at  waist, shoulders and knees have full range of motion, no pitting edema to lower extremities, hips have full range of motion ,no tenderness over the trochanters bilaterally.   Lymphadenopathy:      Cervical: No cervical adenopathy.   Skin:     General: Skin is warm and dry.      Findings: No rash.   Neurological:      General: No focal deficit present.      Mental Status: She is alert and oriented to person, place, and time. Mental status is at baseline.      Cranial Nerves: No cranial nerve deficit.      Coordination: Coordination normal.      Gait: Gait normal.   Psychiatric:         Mood and Affect: Mood normal.         Behavior: Behavior normal.    "      Thought Content: Thought content normal.         Judgment: Judgment normal.       Physical Exam    HENT: Tympanic membranes normal bilaterally. External ears normal bilaterally.  Pulmonary: No wheezing.  Abdomen: No abdominal tenderness. No enlarged liver.  Musculoskeletal: Walking pretty good.             Assessment:       1. RSV infection    2. High risk medication use    3. Physical exam    4. Moderate major depression    5. Hypertension, essential    6. Tension-type headache, not intractable, unspecified chronicity pattern    7. Other screening mammogram    8. Migraine without aura and without status migrainosus, not intractable    9. Seasonal allergic rhinitis due to pollen    10. History of renal calculi    11. Nephrolithiasis    12. Hypothyroidism due to acquired atrophy of thyroid    13. Elevated liver enzymes    14. Chronic fatigue         Plan:       RSV infection  Patient seems to be at the end of her RSV respiratory infection.  Needs RSV vaccine later this fall.  High risk medication use  -     amLODIPine (NORVASC) 5 MG tablet; Take 1 tablet (5 mg total) by mouth once daily.  Dispense: 90 tablet; Refill: 3  -     EScitalopram oxalate (LEXAPRO) 20 MG tablet; Take 1 tablet (20 mg total) by mouth once daily.  Dispense: 90 tablet; Refill: 3  Blood pressure well controlled  Physical exam  -     amLODIPine (NORVASC) 5 MG tablet; Take 1 tablet (5 mg total) by mouth once daily.  Dispense: 90 tablet; Refill: 3  -     EScitalopram oxalate (LEXAPRO) 20 MG tablet; Take 1 tablet (20 mg total) by mouth once daily.  Dispense: 90 tablet; Refill: 3    Moderate major depression  Comments:  increase lexapro to 20  Orders:  -     EScitalopram oxalate (LEXAPRO) 20 MG tablet; Take 1 tablet (20 mg total) by mouth once daily.  Dispense: 90 tablet; Refill: 3    Hypertension, essential  Comments:  bp controlled  Orders:  -     amLODIPine (NORVASC) 5 MG tablet; Take 1 tablet (5 mg total) by mouth once daily.  Dispense: 90  tablet; Refill: 3    Tension-type headache, not intractable, unspecified chronicity pattern  -     codeine-butalbital-ASA-caffeine (BUTALBITAL COMPOUND W/CODEINE) 16--40 mg Cap; Take 1 capsule by mouth every 6 (six) hours as needed.  Dispense: 30 capsule; Refill: 0    Other screening mammogram  -     Mammo Digital Screening Bilat; Future; Expected date: 04/28/2025  Mammogram due in late April  Migraine without aura and without status migrainosus, not intractable  Refill butalbital  Seasonal allergic rhinitis due to pollen    History of renal calculi    Nephrolithiasis    Hypothyroidism due to acquired atrophy of thyroid    Elevated liver enzymes  -     Comprehensive Metabolic Panel; Future; Expected date: 03/19/2025  Suspect elevated liver enzymes due to rosuvastatin use, reduce his rosuvastatin to 20 mg every other day.  AST 86 ., if no improvement in liver enzymes may consider ultrasound of kidneys and liver  Chronic fatigue  Consider sleep study due to patients loud snoring    Assessment & Plan    F32.1 Moderate major depression  J20.5 Acute bronchitis due to RSV  I49.1 Atrial premature depolarization  E78.5 Hyperlipidemia, unspecified  E03.9 Hypothyroidism, unspecified  R51.9 Headache, unspecified  M25.559 Pain in unspecified hip  Z87.442 Personal history of urinary calculi  Z82.5 Family history of asthma and other chronic lower respiratory diseases  Z82.0 Family history of epilepsy and other diseases of the nervous system    MODERATE MAJOR DEPRESSION:  - Reviewed the patient's current antidepressant regimen, which includes Bupropion (Wellbutrin) and Escitalopram (Lexapro).  - Renewed and refilled prescription for Escitalopram 20mg to ensure continued treatment and sufficient supply of medication.    RSV (RSV) INFECTION:  - Eulalia is recovering from RSV, with improved symptoms including coughing, headache, and body aches.  - Physical exam shows no bright red throat or lung wheezing.  - Informed  patient about RSV immunity likely lasting through summer.  - Advised about RSV vaccination eligibility for those over 70 or with certain medical conditions, recommending a shot next winter for prevention.    ATRIAL PREMATURE DEPOLARIZATION:  - Eulalia experiences occasional palpitations due to irregular heartbeat.  - Continuing metoprolol, which has improved the condition.    HYPERLIPIDEMIA:  - Cholesterol levels have significantly improved from over 200 to 159.  - Liver enzymes remain elevated despite reduction in rosuvastatin dosage.  - Explained that statins can affect liver enzymes due to their mechanism of action.  - Continuing rosuvastatin 20 mg every other day for cholesterol management.  - Ordered liver function tests in 2 months.  - Advised to contact the office if liver enzymes do not improve to consider US liver, kidneys, and gallbladder.    HYPOTHYROIDISM:  - TSH levels are slightly elevated at 8.8, indicating sluggish thyroid function.  - Endocrinologist is adjusting thyroid medication.  - New dosing schedule: 112 mcg on Tuesday, Thursday, Saturday, and , and 100 mcg on Monday, Wednesday, and Friday.    HEADACHE:  - The nasty headache experienced during RSV has resolved.  - Refilled butalbital for headache management.    HIP PAIN:  - Eulalia reports hip soreness, especially when standing in the kitchen, walking, or lying in bed for too long.  - Physical exam indicates that the pain is not consistent with bursitis.  - Assessed that it could be deeper hip pain and planned to investigate further.    HISTORY OF URINARY CALCULI:  - Eulalia has a history of kidney cyst and stones, which required drainage and lithotripsy 2 years ago.  - Discussed that calcium-based kidney stones are visible on XR.  - Suggested follow-up imaging to check for recurrence of stones.    FAMILY HISTORY:  - Both parents were heavy smokers and  due to lung-related issues.  - Eulalia has 2 cousins with MS, one diagnosed in her  20s and another in her late 50s or 60s.    FOLLOW-UP:  - Scheduled follow up in 2 months for labs to reassess liver enzymes and cholesterol levels.  - Ordered mammogram for late April or early May.         Follow up in about 6 months (around 9/19/2025).        This note was generated with the assistance of ambient listening technology. Verbal consent was obtained by the patient and accompanying visitor(s) for the recording of patient appointment to facilitate this note. I attest to having reviewed and edited the generated note for accuracy, though some syntax or spelling errors may persist. Please contact the author of this note for any clarification.      3/19/2025 Agustin Brady           [1]   Social History  Socioeconomic History    Marital status:    Tobacco Use    Smoking status: Never    Smokeless tobacco: Never   Substance and Sexual Activity    Alcohol use: Yes     Alcohol/week: 2.0 standard drinks of alcohol     Types: 2 Glasses of wine per week     Comment: nightly    Drug use: No    Sexual activity: Yes     Partners: Male     Social Drivers of Health     Financial Resource Strain: Low Risk  (3/6/2024)    Overall Financial Resource Strain (CARDIA)     Difficulty of Paying Living Expenses: Not hard at all   Food Insecurity: No Food Insecurity (3/6/2024)    Hunger Vital Sign     Worried About Running Out of Food in the Last Year: Never true     Ran Out of Food in the Last Year: Never true   Transportation Needs: No Transportation Needs (3/6/2024)    PRAPARE - Transportation     Lack of Transportation (Medical): No     Lack of Transportation (Non-Medical): No   Physical Activity: Insufficiently Active (3/6/2024)    Exercise Vital Sign     Days of Exercise per Week: 3 days     Minutes of Exercise per Session: 30 min   Stress: Stress Concern Present (3/6/2024)    [employer] [place] - Occupational Stress Questionnaire     Feeling of Stress : To some extent   Housing Stability: Low  Risk  (3/6/2024)    Housing Stability Vital Sign     Unable to Pay for Housing in the Last Year: No     Number of Places Lived in the Last Year: 1     Unstable Housing in the Last Year: No   [2]   Current Outpatient Medications:     ascorbic acid, vitamin C, (VITAMIN C) 500 MG tablet, Take 500 mg by mouth 2 (two) times a day., Disp: , Rfl:     B COMPLEX-VITAMIN B12 tablet, , Disp: , Rfl:     buPROPion (WELLBUTRIN XL) 300 MG 24 hr tablet, Take 1 tablet (300 mg total) by mouth once daily., Disp: 90 tablet, Rfl: 1    cetirizine (ZYRTEC) 10 MG tablet, Take 10 mg by mouth once daily., Disp: , Rfl:     diclofenac sodium (VOLTAREN) 1 % Gel, Apply topically 2 (two) times daily as needed., Disp: 100 g, Rfl: 0    folic acid (FOLVITE) 400 MCG tablet, Take 400 mcg by mouth once daily., Disp: , Rfl:     metoprolol tartrate (LOPRESSOR) 50 MG tablet, Take 1 tablet (50 mg total) by mouth once daily., Disp: 90 tablet, Rfl: 1    PREVIDENT 5000 PLUS 1.1 % Crea, Take by mouth every evening., Disp: , Rfl:     promethazine (PHENERGAN) 25 MG tablet, Take 1 tablet (25 mg total) by mouth every 6 (six) hours as needed for Nausea., Disp: 20 tablet, Rfl: 0    rosuvastatin (CRESTOR) 20 MG tablet, Take 1 tablet (20 mg total) by mouth every Mon, Wed, Fri., Disp: 36 tablet, Rfl: 1    tiZANidine (ZANAFLEX) 4 MG tablet, Take 1 tablet (4 mg total) by mouth every 8 (eight) hours., Disp: 30 tablet, Rfl: 0    triamcinolone (NASACORT) 55 mcg nasal inhaler, 2 sprays by Nasal route once daily., Disp: 1 g, Rfl: 6    UNITHROID 112 mcg tablet, Take 112 mcg by mouth once daily., Disp: , Rfl:     vitamin D (VITAMIN D3) 1000 units Tab, Take 1,000 Units by mouth once daily., Disp: , Rfl:     acetaminophen (TYLENOL) 500 MG tablet, Take 500 mg by mouth every 6 (six) hours as needed for Pain., Disp: , Rfl:     amLODIPine (NORVASC) 5 MG tablet, Take 1 tablet (5 mg total) by mouth once daily., Disp: 90 tablet, Rfl: 3    codeine-butalbital-ASA-caffeine (BUTALBITAL  COMPOUND W/CODEINE) 36--40 mg Cap, Take 1 capsule by mouth every 6 (six) hours as needed., Disp: 30 capsule, Rfl: 0    EScitalopram oxalate (LEXAPRO) 20 MG tablet, Take 1 tablet (20 mg total) by mouth once daily., Disp: 90 tablet, Rfl: 3    ferrous sulfate (SLOW FE ORAL), Take by mouth., Disp: , Rfl:     metroNIDAZOLE (FLAGYL) 500 MG tablet, Take 1 tablet (500 mg total) by mouth 3 (three) times daily., Disp: 21 tablet, Rfl: 0

## 2025-04-28 ENCOUNTER — TELEPHONE (OUTPATIENT)
Dept: FAMILY MEDICINE | Facility: CLINIC | Age: 70
End: 2025-04-28
Payer: MEDICARE

## 2025-04-28 ENCOUNTER — HOSPITAL ENCOUNTER (OUTPATIENT)
Dept: RADIOLOGY | Facility: HOSPITAL | Age: 70
Discharge: HOME | End: 2025-04-28
Attending: FAMILY MEDICINE
Payer: MEDICARE

## 2025-04-28 VITALS — BODY MASS INDEX: 26.63 KG/M2 | HEIGHT: 64 IN | WEIGHT: 156 LBS

## 2025-04-28 DIAGNOSIS — Z12.31 OTHER SCREENING MAMMOGRAM: ICD-10-CM

## 2025-04-28 DIAGNOSIS — E78.5 HYPERLIPIDEMIA, UNSPECIFIED HYPERLIPIDEMIA TYPE: Primary | ICD-10-CM

## 2025-04-28 DIAGNOSIS — G44.209 TENSION-TYPE HEADACHE, NOT INTRACTABLE, UNSPECIFIED CHRONICITY PATTERN: ICD-10-CM

## 2025-04-28 PROCEDURE — 77063 BREAST TOMOSYNTHESIS BI: CPT | Mod: 26,,, | Performed by: RADIOLOGY

## 2025-04-28 PROCEDURE — 77067 SCR MAMMO BI INCL CAD: CPT | Mod: TC,PO

## 2025-04-28 PROCEDURE — 77067 SCR MAMMO BI INCL CAD: CPT | Mod: 26,,, | Performed by: RADIOLOGY

## 2025-04-28 RX ORDER — BUTALBITAL, ASPIRIN, CAFFEINE AND CODEINE PHOSPHATE 50; 325; 40; 30 MG/1; MG/1; MG/1; MG/1
1 CAPSULE ORAL EVERY 6 HOURS PRN
Qty: 30 CAPSULE | Refills: 0 | Status: SHIPPED | OUTPATIENT
Start: 2025-04-28

## 2025-04-28 NOTE — TELEPHONE ENCOUNTER
----- Message from Monik sent at 4/28/2025  2:20 PM CDT -----  Vm- 1:59- pt needs refill on butalbital [facility] [phone]

## 2025-05-04 ENCOUNTER — RESULTS FOLLOW-UP (OUTPATIENT)
Dept: FAMILY MEDICINE | Facility: CLINIC | Age: 70
End: 2025-05-04

## 2025-05-04 NOTE — PROGRESS NOTES
Call patient.  Liver enzymes do show mild improvement AST now 85 ALT down to 72.  Continue rosuvastatin 20 mg every other day.  In September, will need CBC CMP lipids TSH

## 2025-05-28 DIAGNOSIS — G44.209 TENSION-TYPE HEADACHE, NOT INTRACTABLE, UNSPECIFIED CHRONICITY PATTERN: ICD-10-CM

## 2025-05-28 RX ORDER — BUTALBITAL, ASPIRIN, CAFFEINE AND CODEINE PHOSPHATE 50; 325; 40; 30 MG/1; MG/1; MG/1; MG/1
1 CAPSULE ORAL EVERY 6 HOURS PRN
Qty: 30 CAPSULE | Refills: 0 | Status: SHIPPED | OUTPATIENT
Start: 2025-05-28

## 2025-05-28 NOTE — TELEPHONE ENCOUNTER
VM 10:50am    Patient asking for a refill of Butalbital to the pharmacy        ran - last filled 4/28

## 2025-05-30 RX ORDER — ROSUVASTATIN CALCIUM 20 MG/1
20 TABLET, COATED ORAL
Qty: 30 TABLET | Refills: 1 | Status: SHIPPED | OUTPATIENT
Start: 2025-05-30 | End: 2026-05-30

## 2025-05-30 NOTE — TELEPHONE ENCOUNTER
----- Message from Med Assistant Bustillos sent at 5/30/2025 10:56 AM CDT -----  VMPatient is calling about a prescription on Rosuvastatin 20mg.[phone]

## 2025-06-25 ENCOUNTER — LAB VISIT (OUTPATIENT)
Dept: LAB | Facility: HOSPITAL | Age: 70
End: 2025-06-25
Attending: INTERNAL MEDICINE
Payer: MEDICARE

## 2025-06-25 DIAGNOSIS — E05.00 TOXIC DIFFUSE GOITER WITH PRETIBIAL MYXEDEMA: ICD-10-CM

## 2025-06-25 DIAGNOSIS — E03.8 TOXIC DIFFUSE GOITER WITH PRETIBIAL MYXEDEMA: ICD-10-CM

## 2025-06-25 DIAGNOSIS — E04.2 NONTOXIC MULTINODULAR GOITER: Primary | ICD-10-CM

## 2025-06-25 LAB
T4 FREE SERPL-MCNC: 0.95 NG/DL (ref 0.71–1.51)
TSH SERPL-ACNC: 1.3 UIU/ML (ref 0.34–5.6)

## 2025-06-25 PROCEDURE — 84443 ASSAY THYROID STIM HORMONE: CPT

## 2025-06-25 PROCEDURE — 36415 COLL VENOUS BLD VENIPUNCTURE: CPT | Mod: PO

## 2025-06-25 PROCEDURE — 84439 ASSAY OF FREE THYROXINE: CPT

## 2025-06-26 DIAGNOSIS — M85.89 DISAPPEARING BONE DISEASE: Primary | ICD-10-CM

## 2025-06-30 DIAGNOSIS — G44.209 TENSION-TYPE HEADACHE, NOT INTRACTABLE, UNSPECIFIED CHRONICITY PATTERN: ICD-10-CM

## 2025-06-30 RX ORDER — BUTALBITAL, ASPIRIN, CAFFEINE AND CODEINE PHOSPHATE 50; 325; 40; 30 MG/1; MG/1; MG/1; MG/1
1 CAPSULE ORAL EVERY 6 HOURS PRN
Qty: 30 CAPSULE | Refills: 0 | Status: SHIPPED | OUTPATIENT
Start: 2025-06-30

## 2025-06-30 NOTE — TELEPHONE ENCOUNTER
----- Message from Monik sent at 6/30/2025 12:39 PM CDT -----  - 11:05- [facility]      [phone]

## 2025-08-11 DIAGNOSIS — G44.209 TENSION-TYPE HEADACHE, NOT INTRACTABLE, UNSPECIFIED CHRONICITY PATTERN: ICD-10-CM

## 2025-08-11 RX ORDER — BUTALBITAL, ASPIRIN, CAFFEINE AND CODEINE PHOSPHATE 50; 325; 40; 30 MG/1; MG/1; MG/1; MG/1
1 CAPSULE ORAL EVERY 6 HOURS PRN
Qty: 30 CAPSULE | Refills: 0 | Status: SHIPPED | OUTPATIENT
Start: 2025-08-11

## (undated) DEVICE — DRAPE TOP 53X102IN

## (undated) DEVICE — TUBING MINIBORE EXTENSION

## (undated) DEVICE — ELECTRODE REM PLYHSV RETURN 9

## (undated) DEVICE — CATH SUPRAPUBIC SET 12FR

## (undated) DEVICE — KIT PROBE COVER WITH GEL

## (undated) DEVICE — FLOWSWITCH 12/BX

## (undated) DEVICE — CATH URETERAL DUAL LUMEN 10FR

## (undated) DEVICE — TRAY SUTURE REMOVAL IRIS SCIS

## (undated) DEVICE — SOL POVIDONE SCRUB IODINE 4 OZ

## (undated) DEVICE — CATH ANGIO BRAID BERENSTEIN 5F

## (undated) DEVICE — GUIDE WIRE MOTION .035 X 150CM

## (undated) DEVICE — TAPE ADH MEDIPORE 4 X 10YDS

## (undated) DEVICE — GUIDEWIRE TSCF-35-50-3

## (undated) DEVICE — BLADE SURG STAINLESS STEEL #11

## (undated) DEVICE — PAD ABDOMINAL STERILE 8X10IN

## (undated) DEVICE — DRAPE MEDI-SLUSH XL 44X66IN

## (undated) DEVICE — OXISENSOR ADULT DIGIT N/S

## (undated) DEVICE — DRAPE INVISISHIELD TWL 18X24IN

## (undated) DEVICE — COLLECTION SPECIMEN NEPTUNE

## (undated) DEVICE — SPONGE GAUZE 16PLY 4X4

## (undated) DEVICE — TUBING SUC UNIV W/CONN 12FT

## (undated) DEVICE — JELLY LUBRICANT STERILE 4 OZ

## (undated) DEVICE — GOWN NONREINF SET-IN SLV XL

## (undated) DEVICE — INTRODUCER ACCUSTICK RO MARKER

## (undated) DEVICE — DRAPE SURGICAL STERI IRRG PCH

## (undated) DEVICE — INSERT CUSHIONPRONE VIEW LARGE

## (undated) DEVICE — SEAL UROLOGY

## (undated) DEVICE — TOWEL OR DISP STRL BLUE 4/PK

## (undated) DEVICE — SOL IRR SOD CHL .9% POUR

## (undated) DEVICE — SUT SILK 2.0 BLK 18

## (undated) DEVICE — SET STENT RETROGRADE 8FR 88CM

## (undated) DEVICE — DRAPE THREE-QTR REINF 53X77IN

## (undated) DEVICE — TRAY CATH FOL SIL URIMTR 16FR

## (undated) DEVICE — DRAPE STERI LONG

## (undated) DEVICE — BASKET PERC N CIRCLE

## (undated) DEVICE — TAPE SURG MEDIPORE 6X72IN

## (undated) DEVICE — GLOVE BIOGEL SKINSENSE PI 7.5

## (undated) DEVICE — TRAY ANGIO BAPTIST

## (undated) DEVICE — TRAY SUT REM SCISSOR FORCEP

## (undated) DEVICE — CONTAINER SPEC OR STRL 4.5OZ

## (undated) DEVICE — SOL NACL IRR 3000ML

## (undated) DEVICE — SET BASIN 48X48IN 6000ML RING

## (undated) DEVICE — APPLICATOR CHLORAPREP ORN 26ML

## (undated) DEVICE — Device

## (undated) DEVICE — SUT SILK 2-0 BLK BR FSL 18

## (undated) DEVICE — SYR 10CC LUER LOCK

## (undated) DEVICE — SPONGE COTTON TRAY 4X4IN

## (undated) DEVICE — DRAPE T TRNSVRS LAP 102X78X121

## (undated) DEVICE — PAD ABD 8X10 STERILE

## (undated) DEVICE — DRAPE XRAY EQUIPMENT UNIV

## (undated) DEVICE — SET Y-TYPE TUR IRRIGATION 96IN

## (undated) DEVICE — CATH CENTESIS ONESTEP 5FRX10CM